# Patient Record
Sex: FEMALE | Race: OTHER | HISPANIC OR LATINO | URBAN - METROPOLITAN AREA
[De-identification: names, ages, dates, MRNs, and addresses within clinical notes are randomized per-mention and may not be internally consistent; named-entity substitution may affect disease eponyms.]

---

## 2018-10-04 ENCOUNTER — EMERGENCY (EMERGENCY)
Facility: HOSPITAL | Age: 73
LOS: 1 days | Discharge: HOME | End: 2018-10-04
Attending: EMERGENCY MEDICINE
Payer: COMMERCIAL

## 2018-10-04 VITALS
SYSTOLIC BLOOD PRESSURE: 124 MMHG | TEMPERATURE: 98 F | HEART RATE: 63 BPM | DIASTOLIC BLOOD PRESSURE: 76 MMHG | RESPIRATION RATE: 18 BRPM | OXYGEN SATURATION: 99 %

## 2018-10-04 VITALS
TEMPERATURE: 98 F | WEIGHT: 130.07 LBS | RESPIRATION RATE: 18 BRPM | HEIGHT: 64 IN | DIASTOLIC BLOOD PRESSURE: 100 MMHG | HEART RATE: 80 BPM | SYSTOLIC BLOOD PRESSURE: 190 MMHG | OXYGEN SATURATION: 98 %

## 2018-10-04 PROCEDURE — 70450 CT HEAD/BRAIN W/O DYE: CPT

## 2018-10-04 PROCEDURE — 72125 CT NECK SPINE W/O DYE: CPT

## 2018-10-04 PROCEDURE — 99284 EMERGENCY DEPT VISIT MOD MDM: CPT | Mod: 25

## 2018-10-04 PROCEDURE — 99284 EMERGENCY DEPT VISIT MOD MDM: CPT

## 2018-10-04 PROCEDURE — 72125 CT NECK SPINE W/O DYE: CPT | Mod: 26

## 2018-10-04 PROCEDURE — 70450 CT HEAD/BRAIN W/O DYE: CPT | Mod: 26

## 2018-10-04 NOTE — ED PROVIDER NOTE - PHYSICAL EXAMINATION
SKIN: hematoma on R forehead. otherwise within normal limits.   HENMT: oral pharynx normal, no masses seen, symmetric. Otherwise within normal limits.

## 2018-10-04 NOTE — ED ADULT TRIAGE NOTE - CHIEF COMPLAINT QUOTE
Swelling to r.s mary of forehead, reports hitting her face against the chir head rest while travelling by uber ,denied loc

## 2018-10-04 NOTE — ED ADULT NURSE NOTE - NSIMPLEMENTINTERV_GEN_ALL_ED
Implemented All Fall with Harm Risk Interventions:  [place] to call system. Call bell, personal items and telephone within reach. Instruct patient to call for assistance. Room bathroom lighting operational. Non-slip footwear when patient is off stretcher. Physically safe environment: no spills, clutter or unnecessary equipment. Stretcher in lowest position, wheels locked, appropriate side rails in place. Provide visual cue, wrist band, yellow gown, etc. Monitor gait and stability. Monitor for mental status changes and reorient to person, place, and time. Review medications for side effects contributing to fall risk. Reinforce activity limits and safety measures with patient and family. Provide visual clues: red socks.

## 2018-10-04 NOTE — ED PROVIDER NOTE - OBJECTIVE STATEMENT
73 y/o F w/ hx of Parkinson's c/o bruise to R forehead x today. pt was in an uber when lurched forward and back and hit her head on seat. Pt unsure if she is on any other anticoags but is on aspirin. Denies LOC and any other complaints. NKDA.

## 2018-10-04 NOTE — ED ADULT NURSE NOTE - OBJECTIVE STATEMENT
Pt received aaox3, noted with swelling to right forehead s/p injury. hit head against a chair. No loc.

## 2019-08-02 NOTE — ED ADULT TRIAGE NOTE - NS ED TRIAGE HISTORIAN
Informed pharmacy that we are waiting to hear back from patient regarding this.   [facility] and we are under the assumption it is covered there.  See other phone message.   Patient/EMS

## 2021-04-02 PROBLEM — Z00.00 ENCOUNTER FOR PREVENTIVE HEALTH EXAMINATION: Status: ACTIVE | Noted: 2021-04-02

## 2021-04-03 PROBLEM — G20 PARKINSON'S DISEASE: Chronic | Status: ACTIVE | Noted: 2018-10-04

## 2021-04-14 ENCOUNTER — APPOINTMENT (OUTPATIENT)
Dept: UROLOGY | Facility: CLINIC | Age: 76
End: 2021-04-14

## 2021-05-18 ENCOUNTER — EMERGENCY (EMERGENCY)
Facility: HOSPITAL | Age: 76
LOS: 1 days | Discharge: HOME | End: 2021-05-18
Attending: EMERGENCY MEDICINE
Payer: COMMERCIAL

## 2021-05-18 VITALS
SYSTOLIC BLOOD PRESSURE: 124 MMHG | OXYGEN SATURATION: 96 % | DIASTOLIC BLOOD PRESSURE: 79 MMHG | TEMPERATURE: 99 F | RESPIRATION RATE: 18 BRPM | HEART RATE: 89 BPM

## 2021-05-18 VITALS — WEIGHT: 120.81 LBS | HEIGHT: 64 IN

## 2021-05-18 LAB
ALBUMIN SERPL ELPH-MCNC: 3.1 G/DL — LOW (ref 3.5–5)
ALP SERPL-CCNC: 141 U/L — HIGH (ref 40–120)
ALT FLD-CCNC: 10 U/L DA — SIGNIFICANT CHANGE UP (ref 10–60)
ANION GAP SERPL CALC-SCNC: 11 MMOL/L — SIGNIFICANT CHANGE UP (ref 5–17)
APTT BLD: 31.9 SEC — SIGNIFICANT CHANGE UP (ref 27.5–35.5)
AST SERPL-CCNC: 20 U/L — SIGNIFICANT CHANGE UP (ref 10–40)
BASOPHILS # BLD AUTO: 0.04 K/UL — SIGNIFICANT CHANGE UP (ref 0–0.2)
BASOPHILS NFR BLD AUTO: 0.5 % — SIGNIFICANT CHANGE UP (ref 0–2)
BILIRUB SERPL-MCNC: 0.7 MG/DL — SIGNIFICANT CHANGE UP (ref 0.2–1.2)
BUN SERPL-MCNC: 30 MG/DL — HIGH (ref 7–18)
CALCIUM SERPL-MCNC: 10.4 MG/DL — SIGNIFICANT CHANGE UP (ref 8.4–10.5)
CHLORIDE SERPL-SCNC: 108 MMOL/L — SIGNIFICANT CHANGE UP (ref 96–108)
CK MB BLD-MCNC: 2.7 % — SIGNIFICANT CHANGE UP (ref 0–3.5)
CK MB CFR SERPL CALC: 1 NG/ML — SIGNIFICANT CHANGE UP (ref 0–3.6)
CK SERPL-CCNC: 35 U/L — SIGNIFICANT CHANGE UP (ref 21–215)
CK SERPL-CCNC: 37 U/L — SIGNIFICANT CHANGE UP (ref 21–215)
CO2 SERPL-SCNC: 26 MMOL/L — SIGNIFICANT CHANGE UP (ref 22–31)
CREAT SERPL-MCNC: 0.87 MG/DL — SIGNIFICANT CHANGE UP (ref 0.5–1.3)
EOSINOPHIL # BLD AUTO: 0.1 K/UL — SIGNIFICANT CHANGE UP (ref 0–0.5)
EOSINOPHIL NFR BLD AUTO: 1.3 % — SIGNIFICANT CHANGE UP (ref 0–6)
GLUCOSE SERPL-MCNC: 58 MG/DL — LOW (ref 70–99)
HCT VFR BLD CALC: 30.7 % — LOW (ref 34.5–45)
HGB BLD-MCNC: 9.8 G/DL — LOW (ref 11.5–15.5)
IMM GRANULOCYTES NFR BLD AUTO: 0.4 % — SIGNIFICANT CHANGE UP (ref 0–1.5)
INR BLD: 1.04 RATIO — SIGNIFICANT CHANGE UP (ref 0.88–1.16)
LYMPHOCYTES # BLD AUTO: 0.95 K/UL — LOW (ref 1–3.3)
LYMPHOCYTES # BLD AUTO: 12.1 % — LOW (ref 13–44)
MCHC RBC-ENTMCNC: 28.3 PG — SIGNIFICANT CHANGE UP (ref 27–34)
MCHC RBC-ENTMCNC: 31.9 GM/DL — LOW (ref 32–36)
MCV RBC AUTO: 88.7 FL — SIGNIFICANT CHANGE UP (ref 80–100)
MONOCYTES # BLD AUTO: 0.56 K/UL — SIGNIFICANT CHANGE UP (ref 0–0.9)
MONOCYTES NFR BLD AUTO: 7.2 % — SIGNIFICANT CHANGE UP (ref 2–14)
NEUTROPHILS # BLD AUTO: 6.15 K/UL — SIGNIFICANT CHANGE UP (ref 1.8–7.4)
NEUTROPHILS NFR BLD AUTO: 78.5 % — HIGH (ref 43–77)
NRBC # BLD: 0 /100 WBCS — SIGNIFICANT CHANGE UP (ref 0–0)
PLATELET # BLD AUTO: 230 K/UL — SIGNIFICANT CHANGE UP (ref 150–400)
POTASSIUM SERPL-MCNC: 3.5 MMOL/L — SIGNIFICANT CHANGE UP (ref 3.5–5.3)
POTASSIUM SERPL-SCNC: 3.5 MMOL/L — SIGNIFICANT CHANGE UP (ref 3.5–5.3)
PROT SERPL-MCNC: 6.8 G/DL — SIGNIFICANT CHANGE UP (ref 6–8.3)
PROTHROM AB SERPL-ACNC: 12.4 SEC — SIGNIFICANT CHANGE UP (ref 10.6–13.6)
RBC # BLD: 3.46 M/UL — LOW (ref 3.8–5.2)
RBC # FLD: 14.4 % — SIGNIFICANT CHANGE UP (ref 10.3–14.5)
SODIUM SERPL-SCNC: 145 MMOL/L — SIGNIFICANT CHANGE UP (ref 135–145)
TROPONIN I SERPL-MCNC: 0.04 NG/ML — SIGNIFICANT CHANGE UP (ref 0–0.04)
TROPONIN I SERPL-MCNC: 0.04 NG/ML — SIGNIFICANT CHANGE UP (ref 0–0.04)
WBC # BLD: 7.83 K/UL — SIGNIFICANT CHANGE UP (ref 3.8–10.5)
WBC # FLD AUTO: 7.83 K/UL — SIGNIFICANT CHANGE UP (ref 3.8–10.5)

## 2021-05-18 PROCEDURE — 85730 THROMBOPLASTIN TIME PARTIAL: CPT

## 2021-05-18 PROCEDURE — 82553 CREATINE MB FRACTION: CPT

## 2021-05-18 PROCEDURE — 80053 COMPREHEN METABOLIC PANEL: CPT

## 2021-05-18 PROCEDURE — 82550 ASSAY OF CK (CPK): CPT

## 2021-05-18 PROCEDURE — 85025 COMPLETE CBC W/AUTO DIFF WBC: CPT

## 2021-05-18 PROCEDURE — 93005 ELECTROCARDIOGRAM TRACING: CPT

## 2021-05-18 PROCEDURE — 71045 X-RAY EXAM CHEST 1 VIEW: CPT

## 2021-05-18 PROCEDURE — 70450 CT HEAD/BRAIN W/O DYE: CPT

## 2021-05-18 PROCEDURE — 96374 THER/PROPH/DIAG INJ IV PUSH: CPT

## 2021-05-18 PROCEDURE — 99284 EMERGENCY DEPT VISIT MOD MDM: CPT | Mod: 25

## 2021-05-18 PROCEDURE — 85610 PROTHROMBIN TIME: CPT

## 2021-05-18 PROCEDURE — 99285 EMERGENCY DEPT VISIT HI MDM: CPT

## 2021-05-18 PROCEDURE — 70450 CT HEAD/BRAIN W/O DYE: CPT | Mod: 26,MA

## 2021-05-18 PROCEDURE — 36415 COLL VENOUS BLD VENIPUNCTURE: CPT

## 2021-05-18 PROCEDURE — 71045 X-RAY EXAM CHEST 1 VIEW: CPT | Mod: 26

## 2021-05-18 PROCEDURE — 84484 ASSAY OF TROPONIN QUANT: CPT

## 2021-05-18 PROCEDURE — 82962 GLUCOSE BLOOD TEST: CPT

## 2021-05-18 RX ORDER — DEXTROSE 50 % IN WATER 50 %
50 SYRINGE (ML) INTRAVENOUS ONCE
Refills: 0 | Status: COMPLETED | OUTPATIENT
Start: 2021-05-18 | End: 2021-05-18

## 2021-05-18 RX ADMIN — Medication 50 MILLILITER(S): at 12:32

## 2021-05-18 NOTE — ED ADULT NURSE NOTE - OBJECTIVE STATEMENT
From [facility] home as a Stroke notification. Stroke protocol followed. Pt states she had palpiations

## 2021-05-18 NOTE — ED PROVIDER NOTE - CLINICAL SUMMARY MEDICAL DECISION MAKING FREE TEXT BOX
patient with report of right sided weakness and confusion, glucose noted to be low at the time. now fs is normal, no focal neuro deficits noted on reevaluation. CT and labs unremarkable. will monitor patient with report of right sided weakness and confusion, glucose noted to be low at the time. now fs is normal, no focal neuro deficits noted on reevaluation. CT and labs unremarkable. will monitor. AMS likely due to hypoglycemia

## 2021-05-18 NOTE — ED PROVIDER NOTE - OBJECTIVE STATEMENT
Patient sent from nursing home for right sided weakness and confusion noted 1 hour prior to Emergency Department arrival. fs was noted to be 49 at the time. Glucagon given by nursing home staff. Patient on glipizide. Patient denies complaints

## 2021-05-18 NOTE — ED ADULT NURSE NOTE - CHIEF COMPLAINT QUOTE
[facility]  Progress Note     Jonna Rolling Patient Status:  Inpatient    1947 MRN [id number]   [facility] 2NE-A Attending Karolina Lerner MD   Hosp Day # 2 PCP Maria Eugenia Byrd MD     Chief Complaint:  SOB  S:   Walked on exacerbation, left lower lobe pneumonia with hypotension and hypoxia  1. IV antibiotics  Zithro/ rocephin   2. Nebulizer treatments   3. Oxygen as needed   Has home o2   4. Pulmonary following  5. US legs neg   2.  Chest pain with presyncopal episode, CAD w bib/ems notification for R/O Stroke . from NH noted  by staff w/ AMS , Rt sided weakness 30 minutes pta . FS=49  mg/dl . Given Glucagon 10mg IM at the NH   repeat FS= 119 .

## 2021-05-18 NOTE — ED PROVIDER NOTE - NSFOLLOWUPINSTRUCTIONS_ED_ALL_ED_FT
Hypoglycemia in a Person with Diabetes    WHAT YOU NEED TO KNOW:    What is hypoglycemia? Hypoglycemia is a serious condition that happens when your blood glucose (sugar) level drops too low. The blood sugar level is usually too high in a person with diabetes, but the level can also drop too low. It is important to follow your diabetes management plan to keep your blood sugar level steady.    What increases my risk for hypoglycemia?   •Binge eating, eating large amounts of carbohydrates in processed foods such as potato chips      •A missed meal, or a meal eaten later than usual       •Vomiting      •Certain medicines, including insulin or other diabetes medicine       •More exercise than usual, without extra food       •Alcohol use      •Pregnancy, older age      •Decreased liver or kidney function      •Not knowing your symptoms are symptoms of hypoglycemia      What are the signs and symptoms of hypoglycemia?   •Headache, hunger, or shakiness       •Trouble thinking or moodiness       •Sweating, or a pounding heartbeat       •Forgetfulness, confusion, or double vision       •Weakness or trouble walking       •Numbness and tingling around your mouth       •Seizures, coma, or loss of consciousness      How do I manage hypoglycemia?   •Check your blood sugar level right away if you have symptoms of hypoglycemia. Hypoglycemia usually happens when your blood sugar level is 70 mg/dL or below. Ask your diabetes care team what blood sugar level is too low for you.      •If your blood sugar level is too low, eat or drink 15 grams of fast-acting carbohydrate. Examples of this amount of fast-acting carbohydrate are 4 ounces (½ cup) of fruit juice or 4 ounces of regular soda. Other examples are 2 tablespoons of raisins or 1 tube of glucose gel.  Ways to Raise Your Blood Sugar     Check your blood sugar level 15 minutes later. Sit still as you wait. If the level is still low (less than 100 mg/dL), have another 15 grams of carbohydrate. When the level returns to 100 mg/dL, eat a meal if it is time. If your meal time is more than 1 hour away, eat a snack. The snack should contain carbohydrates, such as the following: ?3/4 cup of cereal      ?1 cup of skim or low fat milk      ?6 soda crackers      ?1/2 of a turkey sandwich      ?15 fat-free chips  This will help prevent another drop in blood sugar. Always carefully follow your diabetes care team's instructions on how to treat low blood sugar levels.     •Always carry a source of fast-acting carbohydrate. If you have symptoms of hypoglycemia and you do not have a blood glucose meter, have a source of fast-acting carbohydrate anyway. Avoid carbohydrate foods that are high in fat. The fat content may make the carbohydrate take longer to increase your blood sugar level. Ask your diabetes care team if you should carry a glucagon kit. Glucagon is a medicine that is injected when you develop severe hypoglycemia and become unconscious. Check the expiration date every month and replace it before it expires.      •Teach others how to help you if you have symptoms of hypoglycemia. Tell them about the symptoms of hypoglycemia. Ask them to give you a source of fast-acting carbohydrate if you cannot get it yourself. Ask them to give you a glucagon injection if you have signs of hypoglycemia and you become unconscious or have a seizure. Ask them to call the local emergency number (911 in the ). This is an emergency. Tell them never to try to make you swallow anything if you faint or have a seizure.      •Wear medical alert jewelry or carry a card that says you have diabetes. Ask where to get these items.   Medical Alert Jewelry           How do I prevent hypoglycemia?   •Take diabetes medicine as directed. Take your medicine at the right time and in the right amount. Do not double the amount of medicine you take unless instructed by your diabetes care team.       •Eat regular meals and snacks. Talk to your dietitian or diabetes care team about a meal plan that is right for you. Do not skip meals.      •Check your blood sugar level as directed. Ask your diabetes care team what your blood sugar levels should be before and after you eat. Ask when and how often to check your blood sugar level. You may need to check at least 3 times each day. Record your blood sugar level results and take the record with you when you see your care team. Changes may need to be made to your medicine, food, or exercise schedules using the record.   How to check your blood sugar           •Check your blood sugar level before you exercise. Physical activity, such as exercise, can decrease your blood sugar level. If your blood sugar level is less than 100 mg/dL, have a carbohydrate snack. Examples are 4 to 6 crackers, ½ banana, 8 ounces (1 cup) of nonfat or 1% milk, or 4 ounces (½ cup) of juice. If you will be active for more than 1 hour, you may need to check your blood sugar level every 30 minutes. Your diabetes care team may also recommend that you check your blood sugar level after your activity.      •Know the risks if you choose to drink alcohol. Alcohol can cause your blood sugar levels to be low if you use insulin. Alcohol can cause high blood sugar levels and weight gain if you drink too much. Women 21 years or older and men 65 years or older should limit alcohol to 1 drink a day. Men aged 21 to 64 years should limit alcohol to 2 drinks a day. A drink of alcohol is 12 ounces of beer, 5 ounces of wine, or 1½ ounces of liquor.       When should I or someone else call the local emergency number (911 in the )?   •You have a seizure or pass out.       •Your blood sugar is less than 50 mg/dL and does not respond to treatment.      •You feel you are going to pass out.       •You have trouble thinking clearly.       When should I call my diabetes care team?   •You have had symptoms of low blood sugar several times.       •You have questions about the amount of insulin or diabetes medicine you are taking.       •You have questions or concerns about your condition or care.       CARE AGREEMENT:    You have the right to help plan your care. Learn about your health condition and how it may be treated. Discuss treatment options with your healthcare providers to decide what care you want to receive. You always have the right to refuse treatment.

## 2021-05-18 NOTE — ED PROVIDER NOTE - PROGRESS NOTE DETAILS
Patient ate food in the Emergency Department. glucose remained stable. patient remains awake alert. return to nursing home.

## 2021-05-18 NOTE — ED PROVIDER NOTE - PATIENT PORTAL LINK FT
You can access the FollowMyHealth Patient Portal offered by [facility] by registering at the following website: [email or web address]. By joining [employer]’s FollowMyHealth portal, you will also be able to view your health information using other applications (apps) compatible with our system.

## 2021-05-18 NOTE — ED ADULT TRIAGE NOTE - CHIEF COMPLAINT QUOTE
bib/ems notification for R/O Stroke . from NH noted  by staff w/ AMS , Rt sided weakness 30 minutes pta . FS=49  mg/dl . Given Glucagon 10mg IM at the NH   repeat FS= 119 .

## 2021-05-18 NOTE — ED PROVIDER NOTE - PHYSICAL EXAMINATION
Patient awake NAD, strength and sensation intact in both arms and legs, speech clear. face symmetric. NIHSS 0, passed dysphagia eval

## 2021-06-01 ENCOUNTER — TRANSCRIPTION ENCOUNTER (OUTPATIENT)
Age: 76
End: 2021-06-01

## 2021-06-01 ENCOUNTER — INPATIENT (INPATIENT)
Facility: HOSPITAL | Age: 76
LOS: 19 days | Discharge: NURSING, REHAB OR LONG-TERM CARE | DRG: 394 | End: 2021-06-21
Attending: INTERNAL MEDICINE | Admitting: INTERNAL MEDICINE
Payer: COMMERCIAL

## 2021-06-01 VITALS
OXYGEN SATURATION: 98 % | SYSTOLIC BLOOD PRESSURE: 125 MMHG | DIASTOLIC BLOOD PRESSURE: 75 MMHG | HEIGHT: 64 IN | TEMPERATURE: 98 F | HEART RATE: 74 BPM | RESPIRATION RATE: 18 BRPM | WEIGHT: 110.01 LBS

## 2021-06-01 DIAGNOSIS — Z29.9 ENCOUNTER FOR PROPHYLACTIC MEASURES, UNSPECIFIED: ICD-10-CM

## 2021-06-01 DIAGNOSIS — I82.409 ACUTE EMBOLISM AND THROMBOSIS OF UNSPECIFIED DEEP VEINS OF UNSPECIFIED LOWER EXTREMITY: ICD-10-CM

## 2021-06-01 DIAGNOSIS — I10 ESSENTIAL (PRIMARY) HYPERTENSION: ICD-10-CM

## 2021-06-01 DIAGNOSIS — Z71.89 OTHER SPECIFIED COUNSELING: ICD-10-CM

## 2021-06-01 DIAGNOSIS — Z87.09 PERSONAL HISTORY OF OTHER DISEASES OF THE RESPIRATORY SYSTEM: ICD-10-CM

## 2021-06-01 DIAGNOSIS — G20 PARKINSON'S DISEASE: ICD-10-CM

## 2021-06-01 DIAGNOSIS — K62.5 HEMORRHAGE OF ANUS AND RECTUM: ICD-10-CM

## 2021-06-01 DIAGNOSIS — D64.9 ANEMIA, UNSPECIFIED: ICD-10-CM

## 2021-06-01 DIAGNOSIS — N94.89 OTHER SPECIFIED CONDITIONS ASSOCIATED WITH FEMALE GENITAL ORGANS AND MENSTRUAL CYCLE: ICD-10-CM

## 2021-06-01 DIAGNOSIS — E11.9 TYPE 2 DIABETES MELLITUS WITHOUT COMPLICATIONS: ICD-10-CM

## 2021-06-01 LAB
ALBUMIN SERPL ELPH-MCNC: 2.6 G/DL — LOW (ref 3.5–5)
ALP SERPL-CCNC: 109 U/L — SIGNIFICANT CHANGE UP (ref 40–120)
ALT FLD-CCNC: 14 U/L DA — SIGNIFICANT CHANGE UP (ref 10–60)
ANION GAP SERPL CALC-SCNC: 8 MMOL/L — SIGNIFICANT CHANGE UP (ref 5–17)
APTT BLD: 31.6 SEC — SIGNIFICANT CHANGE UP (ref 27.5–35.5)
AST SERPL-CCNC: 24 U/L — SIGNIFICANT CHANGE UP (ref 10–40)
BASOPHILS # BLD AUTO: 0.04 K/UL — SIGNIFICANT CHANGE UP (ref 0–0.2)
BASOPHILS NFR BLD AUTO: 0.4 % — SIGNIFICANT CHANGE UP (ref 0–2)
BILIRUB SERPL-MCNC: 0.4 MG/DL — SIGNIFICANT CHANGE UP (ref 0.2–1.2)
BLD GP AB SCN SERPL QL: SIGNIFICANT CHANGE UP
BUN SERPL-MCNC: 16 MG/DL — SIGNIFICANT CHANGE UP (ref 7–18)
CALCIUM SERPL-MCNC: 9.8 MG/DL — SIGNIFICANT CHANGE UP (ref 8.4–10.5)
CHLORIDE SERPL-SCNC: 109 MMOL/L — HIGH (ref 96–108)
CO2 SERPL-SCNC: 23 MMOL/L — SIGNIFICANT CHANGE UP (ref 22–31)
CREAT SERPL-MCNC: 0.75 MG/DL — SIGNIFICANT CHANGE UP (ref 0.5–1.3)
EOSINOPHIL # BLD AUTO: 0.11 K/UL — SIGNIFICANT CHANGE UP (ref 0–0.5)
EOSINOPHIL NFR BLD AUTO: 1.2 % — SIGNIFICANT CHANGE UP (ref 0–6)
GLUCOSE BLDC GLUCOMTR-MCNC: 39 MG/DL — CRITICAL LOW (ref 70–99)
GLUCOSE BLDC GLUCOMTR-MCNC: 57 MG/DL — LOW (ref 70–99)
GLUCOSE BLDC GLUCOMTR-MCNC: 71 MG/DL — SIGNIFICANT CHANGE UP (ref 70–99)
GLUCOSE BLDC GLUCOMTR-MCNC: 91 MG/DL — SIGNIFICANT CHANGE UP (ref 70–99)
GLUCOSE BLDC GLUCOMTR-MCNC: 96 MG/DL — SIGNIFICANT CHANGE UP (ref 70–99)
GLUCOSE SERPL-MCNC: 87 MG/DL — SIGNIFICANT CHANGE UP (ref 70–99)
HCT VFR BLD CALC: 27.3 % — LOW (ref 34.5–45)
HCT VFR BLD CALC: 28.3 % — LOW (ref 34.5–45)
HCT VFR BLD CALC: 30.4 % — LOW (ref 34.5–45)
HGB BLD-MCNC: 8.7 G/DL — LOW (ref 11.5–15.5)
HGB BLD-MCNC: 8.9 G/DL — LOW (ref 11.5–15.5)
HGB BLD-MCNC: 9.8 G/DL — LOW (ref 11.5–15.5)
IMM GRANULOCYTES NFR BLD AUTO: 1 % — SIGNIFICANT CHANGE UP (ref 0–1.5)
INR BLD: 1.04 RATIO — SIGNIFICANT CHANGE UP (ref 0.88–1.16)
LYMPHOCYTES # BLD AUTO: 1.07 K/UL — SIGNIFICANT CHANGE UP (ref 1–3.3)
LYMPHOCYTES # BLD AUTO: 11.6 % — LOW (ref 13–44)
MCHC RBC-ENTMCNC: 28 PG — SIGNIFICANT CHANGE UP (ref 27–34)
MCHC RBC-ENTMCNC: 28.2 PG — SIGNIFICANT CHANGE UP (ref 27–34)
MCHC RBC-ENTMCNC: 28.5 PG — SIGNIFICANT CHANGE UP (ref 27–34)
MCHC RBC-ENTMCNC: 31.4 GM/DL — LOW (ref 32–36)
MCHC RBC-ENTMCNC: 31.9 GM/DL — LOW (ref 32–36)
MCHC RBC-ENTMCNC: 32.2 GM/DL — SIGNIFICANT CHANGE UP (ref 32–36)
MCV RBC AUTO: 87.8 FL — SIGNIFICANT CHANGE UP (ref 80–100)
MCV RBC AUTO: 88.4 FL — SIGNIFICANT CHANGE UP (ref 80–100)
MCV RBC AUTO: 89.6 FL — SIGNIFICANT CHANGE UP (ref 80–100)
MONOCYTES # BLD AUTO: 0.57 K/UL — SIGNIFICANT CHANGE UP (ref 0–0.9)
MONOCYTES NFR BLD AUTO: 6.2 % — SIGNIFICANT CHANGE UP (ref 2–14)
NEUTROPHILS # BLD AUTO: 7.33 K/UL — SIGNIFICANT CHANGE UP (ref 1.8–7.4)
NEUTROPHILS NFR BLD AUTO: 79.6 % — HIGH (ref 43–77)
NRBC # BLD: 0 /100 WBCS — SIGNIFICANT CHANGE UP (ref 0–0)
PLATELET # BLD AUTO: 269 K/UL — SIGNIFICANT CHANGE UP (ref 150–400)
PLATELET # BLD AUTO: 280 K/UL — SIGNIFICANT CHANGE UP (ref 150–400)
PLATELET # BLD AUTO: 304 K/UL — SIGNIFICANT CHANGE UP (ref 150–400)
POTASSIUM SERPL-MCNC: 4.5 MMOL/L — SIGNIFICANT CHANGE UP (ref 3.5–5.3)
POTASSIUM SERPL-SCNC: 4.5 MMOL/L — SIGNIFICANT CHANGE UP (ref 3.5–5.3)
PROT SERPL-MCNC: 5.8 G/DL — LOW (ref 6–8.3)
PROTHROM AB SERPL-ACNC: 12.3 SEC — SIGNIFICANT CHANGE UP (ref 10.6–13.6)
RBC # BLD: 3.11 M/UL — LOW (ref 3.8–5.2)
RBC # BLD: 3.16 M/UL — LOW (ref 3.8–5.2)
RBC # BLD: 3.44 M/UL — LOW (ref 3.8–5.2)
RBC # FLD: 15.1 % — HIGH (ref 10.3–14.5)
RBC # FLD: 15.2 % — HIGH (ref 10.3–14.5)
RBC # FLD: 15.4 % — HIGH (ref 10.3–14.5)
SARS-COV-2 RNA SPEC QL NAA+PROBE: SIGNIFICANT CHANGE UP
SODIUM SERPL-SCNC: 140 MMOL/L — SIGNIFICANT CHANGE UP (ref 135–145)
WBC # BLD: 7.91 K/UL — SIGNIFICANT CHANGE UP (ref 3.8–10.5)
WBC # BLD: 9.15 K/UL — SIGNIFICANT CHANGE UP (ref 3.8–10.5)
WBC # BLD: 9.21 K/UL — SIGNIFICANT CHANGE UP (ref 3.8–10.5)
WBC # FLD AUTO: 7.91 K/UL — SIGNIFICANT CHANGE UP (ref 3.8–10.5)
WBC # FLD AUTO: 9.15 K/UL — SIGNIFICANT CHANGE UP (ref 3.8–10.5)
WBC # FLD AUTO: 9.21 K/UL — SIGNIFICANT CHANGE UP (ref 3.8–10.5)

## 2021-06-01 PROCEDURE — 93970 EXTREMITY STUDY: CPT | Mod: 26

## 2021-06-01 PROCEDURE — 99291 CRITICAL CARE FIRST HOUR: CPT

## 2021-06-01 PROCEDURE — 74177 CT ABD & PELVIS W/CONTRAST: CPT | Mod: 26,MA

## 2021-06-01 RX ORDER — POLYETHYLENE GLYCOL 3350 17 G/17G
17 POWDER, FOR SOLUTION ORAL DAILY
Refills: 0 | Status: DISCONTINUED | OUTPATIENT
Start: 2021-06-01 | End: 2021-06-21

## 2021-06-01 RX ORDER — SODIUM CHLORIDE 9 MG/ML
1000 INJECTION INTRAMUSCULAR; INTRAVENOUS; SUBCUTANEOUS
Refills: 0 | Status: DISCONTINUED | OUTPATIENT
Start: 2021-06-01 | End: 2021-06-01

## 2021-06-01 RX ORDER — ONDANSETRON 8 MG/1
4 TABLET, FILM COATED ORAL EVERY 6 HOURS
Refills: 0 | Status: DISCONTINUED | OUTPATIENT
Start: 2021-06-01 | End: 2021-06-21

## 2021-06-01 RX ORDER — ALBUTEROL 90 UG/1
2 AEROSOL, METERED ORAL EVERY 6 HOURS
Refills: 0 | Status: DISCONTINUED | OUTPATIENT
Start: 2021-06-01 | End: 2021-06-21

## 2021-06-01 RX ORDER — SODIUM CHLORIDE 9 MG/ML
1000 INJECTION, SOLUTION INTRAVENOUS
Refills: 0 | Status: DISCONTINUED | OUTPATIENT
Start: 2021-06-01 | End: 2021-06-02

## 2021-06-01 RX ORDER — RASAGILINE 0.5 MG/1
1 TABLET ORAL DAILY
Refills: 0 | Status: DISCONTINUED | OUTPATIENT
Start: 2021-06-01 | End: 2021-06-21

## 2021-06-01 RX ORDER — INSULIN LISPRO 100/ML
VIAL (ML) SUBCUTANEOUS
Refills: 0 | Status: DISCONTINUED | OUTPATIENT
Start: 2021-06-01 | End: 2021-06-21

## 2021-06-01 RX ORDER — OXYBUTYNIN CHLORIDE 5 MG
5 TABLET ORAL DAILY
Refills: 0 | Status: DISCONTINUED | OUTPATIENT
Start: 2021-06-01 | End: 2021-06-01

## 2021-06-01 RX ORDER — CARBIDOPA, LEVODOPA, AND ENTACAPONE 50; 200; 200 MG/1; MG/1; MG/1
1 TABLET, FILM COATED ORAL THREE TIMES A DAY
Refills: 0 | Status: DISCONTINUED | OUTPATIENT
Start: 2021-06-01 | End: 2021-06-21

## 2021-06-01 RX ORDER — ACETAMINOPHEN 500 MG
650 TABLET ORAL EVERY 6 HOURS
Refills: 0 | Status: DISCONTINUED | OUTPATIENT
Start: 2021-06-01 | End: 2021-06-21

## 2021-06-01 RX ORDER — SODIUM CHLORIDE 9 MG/ML
1000 INJECTION INTRAMUSCULAR; INTRAVENOUS; SUBCUTANEOUS ONCE
Refills: 0 | Status: COMPLETED | OUTPATIENT
Start: 2021-06-01 | End: 2021-06-01

## 2021-06-01 RX ORDER — SENNA PLUS 8.6 MG/1
2 TABLET ORAL AT BEDTIME
Refills: 0 | Status: DISCONTINUED | OUTPATIENT
Start: 2021-06-01 | End: 2021-06-21

## 2021-06-01 RX ORDER — SOD SULF/SODIUM/NAHCO3/KCL/PEG
4000 SOLUTION, RECONSTITUTED, ORAL ORAL ONCE
Refills: 0 | Status: COMPLETED | OUTPATIENT
Start: 2021-06-01 | End: 2021-06-01

## 2021-06-01 RX ORDER — OXYBUTYNIN CHLORIDE 5 MG
5 TABLET ORAL DAILY
Refills: 0 | Status: DISCONTINUED | OUTPATIENT
Start: 2021-06-01 | End: 2021-06-21

## 2021-06-01 RX ORDER — PANTOPRAZOLE SODIUM 20 MG/1
40 TABLET, DELAYED RELEASE ORAL
Refills: 0 | Status: DISCONTINUED | OUTPATIENT
Start: 2021-06-01 | End: 2021-06-08

## 2021-06-01 RX ADMIN — SENNA PLUS 2 TABLET(S): 8.6 TABLET ORAL at 23:14

## 2021-06-01 RX ADMIN — CARBIDOPA, LEVODOPA, AND ENTACAPONE 1 TABLET(S): 50; 200; 200 TABLET, FILM COATED ORAL at 14:02

## 2021-06-01 RX ADMIN — SODIUM CHLORIDE 75 MILLILITER(S): 9 INJECTION, SOLUTION INTRAVENOUS at 14:08

## 2021-06-01 RX ADMIN — SODIUM CHLORIDE 1000 MILLILITER(S): 9 INJECTION INTRAMUSCULAR; INTRAVENOUS; SUBCUTANEOUS at 08:20

## 2021-06-01 RX ADMIN — SODIUM CHLORIDE 75 MILLILITER(S): 9 INJECTION, SOLUTION INTRAVENOUS at 23:45

## 2021-06-01 RX ADMIN — PANTOPRAZOLE SODIUM 40 MILLIGRAM(S): 20 TABLET, DELAYED RELEASE ORAL at 18:29

## 2021-06-01 RX ADMIN — Medication 20 MILLIGRAM(S): at 18:24

## 2021-06-01 RX ADMIN — CARBIDOPA, LEVODOPA, AND ENTACAPONE 1 TABLET(S): 50; 200; 200 TABLET, FILM COATED ORAL at 23:14

## 2021-06-01 NOTE — CONSULT NOTE ADULT - ASSESSMENT
The etiology for rectal bleeding in this patient is most likely due to:  1. Rectal ulcer  2. Proctitis  3. Colorectal neoplasm  4. Hemorrhoids    Suggestions:    1. Monitor H/H  2. Transfuse PRBC as needed  3. Avoid NSAID  4. Protonix 40mg daily  5. Colonoscopy  6. Check CEA  7. DVT prophylaxis

## 2021-06-01 NOTE — H&P ADULT - PROBLEM SELECTOR PLAN 10
discussed GOC with daughter, Batool Johnson ([phone])  patient is DNR/DNI  MOLST form drafted and placed in chart

## 2021-06-01 NOTE — CONSULT NOTE ADULT - NECK DETAILS
When letter from Dr. Brewer office is received. MA will fax over all information needed to schedule appt with rheumatology.    supple/no JVD

## 2021-06-01 NOTE — H&P ADULT - NSHPSOCIALHISTORY_GEN_ALL_CORE
Patient is from [facility]. Daughter denies any smoking, alcohol use or use of illicit drugs.

## 2021-06-01 NOTE — H&P ADULT - PROBLEM SELECTOR PLAN 9
hold AC in setting of GI bleed  SCD on right leg only due to noted DVT on LLE hold AC in setting of GI bleed  cannot use SCD due to DVT in lower extremities

## 2021-06-01 NOTE — ED PROVIDER NOTE - CLINICAL SUMMARY MEDICAL DECISION MAKING FREE TEXT BOX
75 yr old female with hx of parkinsons lumbar fx, copd, diverticulitis, HTN, DM, HLD presents to ed for vaginal bleeding per nsg home. pt c/o left leg pain but otherwise no sx. limited ros due to poor hx    rectal bleeding- possibly diverticulitis vs gib- labs, ct, admit

## 2021-06-01 NOTE — H&P ADULT - PROBLEM SELECTOR PLAN 2
noted to have DVT in LLE on CT abdomen   f/u US duplex of bilateral lower extremities   patient not given anticoagulation due to GI bleed  heme/onc, Dr. Yan consulted   patient may need IVC filter

## 2021-06-01 NOTE — H&P ADULT - ASSESSMENT
Patient is a 75 year old female from [place], with PMH of Parkinson's, COPD, diverticulosis, HTN, DM and HLD, who presented to the ED due to rectal bleed.     Hgb of 9.8. COVID negative. CT abdomen showing rectal wall thickening, colonic diverticulosis without diverticulitis, right adnexal mass and DVT noted in LLE (left common femoral, femoral and deep femoral veins).

## 2021-06-01 NOTE — ED PROVIDER NOTE - PROGRESS NOTE DETAILS
Arellano: h/h stable normal BUN. hemodynamically stable but ct shows left dvt, diverticulosis, proctitis and adnexal mass. will not anticoagulate due to rectal bleeding.  admit to med for rectal bleed along with the other findings

## 2021-06-01 NOTE — H&P ADULT - ATTENDING COMMENTS
patient with rectal bleed , dvt , adnexal mass for acute hospitalization   care plan as above   monitor cbc   gi , oncology eval   prognosis poor   care plan d/w NH RN ,ER ATTENDING , RESIDENT AND PATIENT'S FAMILY.

## 2021-06-01 NOTE — H&P ADULT - NSICDXPASTMEDICALHX_GEN_ALL_CORE_FT
PAST MEDICAL HISTORY:  Diabetes mellitus     Diverticulosis     History of COPD     HLD (hyperlipidemia)     HTN (hypertension)     Parkinson disease

## 2021-06-01 NOTE — ED PROVIDER NOTE - OBJECTIVE STATEMENT
75 yr old female with hx of parkinsons lumbar fx, copd, diverticulitis, HTN, DM, HLD presents to ed for vaginal bleeding per nsg home. pt c/o left leg pain but otherwise no sx. limited ros due to poor hx

## 2021-06-01 NOTE — H&P ADULT - PROBLEM SELECTOR PLAN 1
patient noted to have rectal bleeding   Hgb stable from prior labs at 9.8   will monitor CBC q6 for now  blood transfusion consent in chart if needed; transfuse if <7   GI, Dr. Galvez consulted   will start on clear liquid diet for now  PPI IV BID

## 2021-06-01 NOTE — H&P ADULT - HISTORY OF PRESENT ILLNESS
Patient is a 75 year old female from [place], with PMH of Parkinson's, COPD, diverticulosis, HTN, DM and HLD, who presented to the ED due to rectal bleed. History is limited from patient due to her being a poor historian. As per ED provider note, patient was sent to the ED for concern for vaginal bleed. In ED, patient is noted to have rectal bleed instead of vaginal bleed. Spoke to daughter, Batool Johnson ([phone]) for further history. Daughter states she was told that patient was sent to ED for concern for vaginal bleed. Does not have any other concerns or complaints at this time.

## 2021-06-01 NOTE — CONSULT NOTE ADULT - SUBJECTIVE AND OBJECTIVE BOX
[  ] STAT REQUEST              [ X ] ROUTINE REQUEST    Patient is a 75 year old with rectal bleeding. GI consulted to evaluate.        HPI:  Patient is a 75 year old female from [place], with past medical history significant for Parkinson's, COPD, diverticulosis, HTN, DM and HLD, who presented to the ED due to rectal bleed. History is limited from patient due to her being a poor historian. As per ED provider note, patient was sent to the ED for concern for vaginal bleed. In ED, patient is noted to have rectal bleed instead of vaginal bleed. Patient is confused unable to provide any history. No abdominal pain, nausea, vomiting, hematemesis, melena, fever, chills, chest pain, SOB, cough, hematuria, dysuria or diarrhea reported.       PAIN MANAGEMENT:  Pain Scale:                0/10  Pain Location:      Prior Colonoscopy: Unknown    PAST MEDICAL HISTORY  Parkinson disease  COPD  Diverticulosis  HTN (hypertension)  Diabetes mellitus  HLD (hyperlipidemia)        PAST SURGICAL HISTORY  No significant past surgical history        Allergies    No Known Allergies    Intolerances  None       MEDICATIONS  (STANDING):  carbidopa/levodopa/entacapone 12.5/50/200 1 Tablet(s) Oral three times a day  dextrose 5% + sodium chloride 0.9%. 1000 milliLiter(s) (75 mL/Hr) IV Continuous <Continuous>  enalapril 20 milliGRAM(s) Oral two times a day  insulin lispro (ADMELOG) corrective regimen sliding scale   SubCutaneous Before meals and at bedtime  oxybutynin XL 5 milliGRAM(s) Oral daily  pantoprazole  Injectable 40 milliGRAM(s) IV Push two times a day  rasagiline Tablet 1 milliGRAM(s) Oral daily  senna 2 Tablet(s) Oral at bedtime    MEDICATIONS  (PRN):  acetaminophen   Tablet .. 650 milliGRAM(s) Oral every 6 hours PRN Temp greater or equal to 38C (100.4F), Moderate Pain (4 - 6)  ALBUTerol    90 MICROgram(s) HFA Inhaler 2 Puff(s) Inhalation every 6 hours PRN Shortness of Breath and/or Wheezing  ondansetron Injectable 4 milliGRAM(s) IV Push every 6 hours PRN Nausea and/or Vomiting  polyethylene glycol 3350 17 Gram(s) Oral daily PRN Constipation      SOCIAL HISTORY  Advanced Directives:       [ X ] Full Code       [  ] DNR  Marital Status:         [  ] M      [ X ] S      [  ] D       [  ] W  Children:       [ X ] Yes      [  ] No  Occupation:        [  ] Employed       [ X ] Unemployed       [  ] Retired  Diet:       [ X ] Regular       [  ] PEG feeding          [  ] NG tube feeding  Drug Use:           [ X ] Patient denied          [  ] Yes  Alcohol:           [ X ] No             [  ] Yes (socially)         [  ] Yes (chronic)  Tobacco:           [  ] Yes           [ X ] No    FAMILY HISTORY  [ X ] Heart Disease            [ X ] Diabetes             [ X ] HTN             [  ] Colon Cancer             [  ] Stomach Cancer              [  ] Pancreatic Cancer    VITAL SIGNS   Vital Signs Last 24 Hrs  T(C): 36.4 (01 Jun 2021 11:33), Max: 36.8 (01 Jun 2021 10:32)  T(F): 97.6 (01 Jun 2021 11:33), Max: 98.3 (01 Jun 2021 10:32)  HR: 87 (01 Jun 2021 11:33) (74 - 87)  BP: 167/80 (01 Jun 2021 11:33) (125/75 - 167/80)   RR: 18 (01 Jun 2021 11:33) (18 - 18)  SpO2: 95% (01 Jun 2021 11:33) (95% - 100%)  Daily Height in cm: 162.56 (01 Jun 2021 07:28)            CBC Full  -  ( 01 Jun 2021 16:18 )  WBC Count : 9.15 K/uL  RBC Count : 3.16 M/uL  Hemoglobin : 8.9 g/dL  Hematocrit : 28.3 %  Platelet Count - Automated : 269 K/uL  Mean Cell Volume : 89.6 fl  Mean Cell Hemoglobin : 28.2 pg  Mean Cell Hemoglobin Concentration : 31.4 gm/dL  Auto Neutrophil # : x  Auto Lymphocyte # : x  Auto Monocyte # : x  Auto Eosinophil # : x  Auto Basophil # : x  Auto Neutrophil % : x  Auto Lymphocyte % : x  Auto Monocyte % : x  Auto Eosinophil % : x  Auto Basophil % : x      06-01    140  |  109<H>  |  16  ----------------------------<  87  4.5   |  23  |  0.75    Ca    9.8      01 Jun 2021 08:22    TPro  5.8<L>  /  Alb  2.6<L>  /  TBili  0.4  /  DBili  x   /  AST  24  /  ALT  14  /  AlkPhos  109  06-01          ALT/SGPT 14  Albumin, Serum 2.6  Alkaline Phosphatase 109  AST/SGOT 24  Bilirubin Direct --  Bilirubin Total 0.4  Indirect Bilirubin --  Hepatitis A Total --  Hepatitis B Core Antibody --  Hepatitis B Surface Antibody --  Hepatitis B Surface Antigen --  Hepatitis C Virus Interpretation --  Hepatitis C Virus Genotype --          PT/INR - ( 01 Jun 2021 08:22 )   PT: 12.3 sec;   INR: 1.04 ratio         PTT - ( 01 Jun 2021 08:22 )  PTT:31.6 sec    RADIOLOGY/IMAGING                           [  ] STAT REQUEST              [ X ] ROUTINE REQUEST    Patient is a 75 year old with rectal bleeding. GI consulted to evaluate.        HPI:  Patient is a 75 year old female from [place], with past medical history significant for Parkinson's, COPD, diverticulosis, HTN, DM and HLD, who presented to the ED due to rectal bleed. History is limited from patient due to her being a poor historian. As per ED provider note, patient was sent to the ED for concern for vaginal bleed. In ED, patient is noted to have rectal bleed instead of vaginal bleed. Patient is confused unable to provide any history. No abdominal pain, nausea, vomiting, hematemesis, melena, fever, chills, chest pain, SOB, cough, hematuria, dysuria or diarrhea reported.       PAIN MANAGEMENT:  Pain Scale:                0/10  Pain Location:      Prior Colonoscopy: Unknown    PAST MEDICAL HISTORY  Parkinson disease  COPD  Diverticulosis  HTN (hypertension)  Diabetes mellitus  HLD (hyperlipidemia)        PAST SURGICAL HISTORY  No significant past surgical history        Allergies    No Known Allergies    Intolerances  None       MEDICATIONS  (STANDING):  carbidopa/levodopa/entacapone 12.5/50/200 1 Tablet(s) Oral three times a day  dextrose 5% + sodium chloride 0.9%. 1000 milliLiter(s) (75 mL/Hr) IV Continuous <Continuous>  enalapril 20 milliGRAM(s) Oral two times a day  insulin lispro (ADMELOG) corrective regimen sliding scale   SubCutaneous Before meals and at bedtime  oxybutynin XL 5 milliGRAM(s) Oral daily  pantoprazole  Injectable 40 milliGRAM(s) IV Push two times a day  rasagiline Tablet 1 milliGRAM(s) Oral daily  senna 2 Tablet(s) Oral at bedtime    MEDICATIONS  (PRN):  acetaminophen   Tablet .. 650 milliGRAM(s) Oral every 6 hours PRN Temp greater or equal to 38C (100.4F), Moderate Pain (4 - 6)  ALBUTerol    90 MICROgram(s) HFA Inhaler 2 Puff(s) Inhalation every 6 hours PRN Shortness of Breath and/or Wheezing  ondansetron Injectable 4 milliGRAM(s) IV Push every 6 hours PRN Nausea and/or Vomiting  polyethylene glycol 3350 17 Gram(s) Oral daily PRN Constipation      SOCIAL HISTORY  Advanced Directives:       [ X ] Full Code       [  ] DNR  Marital Status:         [  ] M      [ X ] S      [  ] D       [  ] W  Children:       [ X ] Yes      [  ] No  Occupation:        [  ] Employed       [ X ] Unemployed       [  ] Retired  Diet:       [ X ] Regular       [  ] PEG feeding          [  ] NG tube feeding  Drug Use:           [ X ] Patient denied          [  ] Yes  Alcohol:           [ X ] No             [  ] Yes (socially)         [  ] Yes (chronic)  Tobacco:           [  ] Yes           [ X ] No    FAMILY HISTORY  [ X ] Heart Disease            [ X ] Diabetes             [ X ] HTN             [  ] Colon Cancer             [  ] Stomach Cancer              [  ] Pancreatic Cancer    VITAL SIGNS   Vital Signs Last 24 Hrs  T(C): 36.4 (01 Jun 2021 11:33), Max: 36.8 (01 Jun 2021 10:32)  T(F): 97.6 (01 Jun 2021 11:33), Max: 98.3 (01 Jun 2021 10:32)  HR: 87 (01 Jun 2021 11:33) (74 - 87)  BP: 167/80 (01 Jun 2021 11:33) (125/75 - 167/80)   RR: 18 (01 Jun 2021 11:33) (18 - 18)  SpO2: 95% (01 Jun 2021 11:33) (95% - 100%)  Daily Height in cm: 162.56 (01 Jun 2021 07:28)            CBC Full  -  ( 01 Jun 2021 16:18 )  WBC Count : 9.15 K/uL  RBC Count : 3.16 M/uL  Hemoglobin : 8.9 g/dL  Hematocrit : 28.3 %  Platelet Count - Automated : 269 K/uL  Mean Cell Volume : 89.6 fl  Mean Cell Hemoglobin : 28.2 pg  Mean Cell Hemoglobin Concentration : 31.4 gm/dL  Auto Neutrophil # : x  Auto Lymphocyte # : x  Auto Monocyte # : x  Auto Eosinophil # : x  Auto Basophil # : x  Auto Neutrophil % : x  Auto Lymphocyte % : x  Auto Monocyte % : x  Auto Eosinophil % : x  Auto Basophil % : x      06-01    140  |  109<H>  |  16  ----------------------------<  87  4.5   |  23  |  0.75    Ca    9.8      01 Jun 2021 08:22    TPro  5.8<L>  /  Alb  2.6<L>  /  TBili  0.4  /  DBili  x   /  AST  24  /  ALT  14  /  AlkPhos  109  06-01     PT/INR - ( 01 Jun 2021 08:22 )   PT: 12.3 sec;   INR: 1.04 ratio       PTT - ( 01 Jun 2021 08:22 )  PTT:31.6 sec    < from: 12 Lead ECG (05.18.21 @ 10:17) >  Ventricular Rate 78 BPM    Atrial Rate 78 BPM    P-R Interval 118 ms    QRS Duration 78 ms    Q-T Interval 372 ms    QTC Calculation(Bazett) 424 ms    P Axis 61 degrees    R Axis 8 degrees    T Axis -18 degrees    Diagnosis Line *** Poor data quality, interpretation may be adversely affected  Normal sinus rhythm  Voltage criteria for left ventricular hypertrophy  Nonspecific ST abnormality  Abnormal ECG       RADIOLOGY/IMAGING                  EXAM:  CT ABDOMEN AND PELVIS IC                            PROCEDURE DATE:  06/01/2021          INTERPRETATION:  CLINICAL INFORMATION: Rectal bleeding    COMPARISON: None.    CONTRAST/COMPLICATIONS:  IV Contrast: Omnipaque 350  90 cc [id number] cc discarded  Oral Contrast: NONE  Complications: None reported at time of study completion    PROCEDURE:  CT of the Abdomen and Pelvis was performed.  Sagittal and coronal reformats were performed.    FINDINGS:  LOWER CHEST: Clear lung bases.    LIVER: Multiple subcentimeter hepatic hypodensities, too small to further characterize.  BILE DUCTS: Normal caliber.  GALLBLADDER: Cholelithiasis.  SPLEEN: Within normal limits.  PANCREAS: Mildly atrophic.  ADRENALS: Within normal limits.  KIDNEYS/URETERS: Subcentimeter renal hypodensities, too small to further characterize. No hydronephrosis.    BLADDER: Within normal limits.  REPRODUCTIVE ORGANS: Uterus and left adnexa within normal limits. 3.7 x 2.8 cm right adnexal mass.    BOWEL: No bowel obstruction. Appendix is normal. Colonic diverticulosis. Rectal wall thickening.  PERITONEUM: Trace pelvic fluid.  VESSELS: Atherosclerotic changes. Filling defects noted within the left common femoral, femoral, and deep femoral veins.  RETROPERITONEUM/LYMPH NODES: No lymphadenopathy.  ABDOMINAL WALL: Small fat-containing left inguinal hernia.  BONES: Degenerative changes. Severe compression deformity of L2. Fixation cement noted within the L2 vertebral body. Mild wedge deformity of T11.    IMPRESSION:    1. Rectal wall thickening, question proctitis.  2. Colonic diverticulosis without CT evidence of acute diverticulitis. No bowel obstruction.  3. Right adnexal mass (3.7 x 2.8 cm). Recommend pelvic ultrasound for further assessment.  4. Deep venous thrombosis within the imaged left lower extremity (left common femoral, femoral, and deep femoral veins).

## 2021-06-01 NOTE — ED ADULT NURSE NOTE - OBJECTIVE STATEMENT
Pt awake, Aox1, BIB EMS from NH c/o vaginal bleed since last night. Pt also c/o left leg pain. Pt received in bed, actively bleed noted from rectum. Pt denies abdominal pain, CP, SOB.

## 2021-06-02 LAB
24R-OH-CALCIDIOL SERPL-MCNC: 28.9 NG/ML — LOW (ref 30–80)
A1C WITH ESTIMATED AVERAGE GLUCOSE RESULT: 5.7 % — HIGH (ref 4–5.6)
ANION GAP SERPL CALC-SCNC: 6 MMOL/L — SIGNIFICANT CHANGE UP (ref 5–17)
APTT BLD: 28.8 SEC — SIGNIFICANT CHANGE UP (ref 27.5–35.5)
BUN SERPL-MCNC: 12 MG/DL — SIGNIFICANT CHANGE UP (ref 7–18)
CALCIUM SERPL-MCNC: 8.9 MG/DL — SIGNIFICANT CHANGE UP (ref 8.4–10.5)
CANCER AG125 SERPL-ACNC: 10 U/ML — SIGNIFICANT CHANGE UP
CANCER AG19-9 SERPL-ACNC: <2 U/ML — SIGNIFICANT CHANGE UP
CEA SERPL-MCNC: 2 NG/ML — SIGNIFICANT CHANGE UP (ref 0–3.8)
CHLORIDE SERPL-SCNC: 113 MMOL/L — HIGH (ref 96–108)
CHOLEST SERPL-MCNC: 138 MG/DL — SIGNIFICANT CHANGE UP
CO2 SERPL-SCNC: 22 MMOL/L — SIGNIFICANT CHANGE UP (ref 22–31)
COVID-19 SPIKE DOMAIN AB INTERP: POSITIVE
COVID-19 SPIKE DOMAIN ANTIBODY RESULT: 6.17 U/ML — HIGH
CREAT SERPL-MCNC: 0.59 MG/DL — SIGNIFICANT CHANGE UP (ref 0.5–1.3)
ESTIMATED AVERAGE GLUCOSE: 117 MG/DL — HIGH (ref 68–114)
FERRITIN SERPL-MCNC: 451 NG/ML — HIGH (ref 15–150)
FOLATE SERPL-MCNC: 8.1 NG/ML — SIGNIFICANT CHANGE UP
GLUCOSE BLDC GLUCOMTR-MCNC: 103 MG/DL — HIGH (ref 70–99)
GLUCOSE BLDC GLUCOMTR-MCNC: 105 MG/DL — HIGH (ref 70–99)
GLUCOSE BLDC GLUCOMTR-MCNC: 115 MG/DL — HIGH (ref 70–99)
GLUCOSE BLDC GLUCOMTR-MCNC: 158 MG/DL — HIGH (ref 70–99)
GLUCOSE BLDC GLUCOMTR-MCNC: 166 MG/DL — HIGH (ref 70–99)
GLUCOSE SERPL-MCNC: 127 MG/DL — HIGH (ref 70–99)
HCT VFR BLD CALC: 24.9 % — LOW (ref 34.5–45)
HDLC SERPL-MCNC: 39 MG/DL — LOW
HGB BLD-MCNC: 8 G/DL — LOW (ref 11.5–15.5)
INR BLD: 1.09 RATIO — SIGNIFICANT CHANGE UP (ref 0.88–1.16)
IRON SATN MFR SERPL: 20 % — SIGNIFICANT CHANGE UP (ref 15–50)
IRON SATN MFR SERPL: 33 UG/DL — LOW (ref 40–150)
LIPID PNL WITH DIRECT LDL SERPL: 81 MG/DL — SIGNIFICANT CHANGE UP
MAGNESIUM SERPL-MCNC: 2 MG/DL — SIGNIFICANT CHANGE UP (ref 1.6–2.6)
MCHC RBC-ENTMCNC: 28.2 PG — SIGNIFICANT CHANGE UP (ref 27–34)
MCHC RBC-ENTMCNC: 32.1 GM/DL — SIGNIFICANT CHANGE UP (ref 32–36)
MCV RBC AUTO: 87.7 FL — SIGNIFICANT CHANGE UP (ref 80–100)
NON HDL CHOLESTEROL: 99 MG/DL — SIGNIFICANT CHANGE UP
NRBC # BLD: 0 /100 WBCS — SIGNIFICANT CHANGE UP (ref 0–0)
PHOSPHATE SERPL-MCNC: 2.7 MG/DL — SIGNIFICANT CHANGE UP (ref 2.5–4.5)
PLATELET # BLD AUTO: 256 K/UL — SIGNIFICANT CHANGE UP (ref 150–400)
POTASSIUM SERPL-MCNC: 3.7 MMOL/L — SIGNIFICANT CHANGE UP (ref 3.5–5.3)
POTASSIUM SERPL-SCNC: 3.7 MMOL/L — SIGNIFICANT CHANGE UP (ref 3.5–5.3)
PROTHROM AB SERPL-ACNC: 12.9 SEC — SIGNIFICANT CHANGE UP (ref 10.6–13.6)
RBC # BLD: 2.84 M/UL — LOW (ref 3.8–5.2)
RBC # FLD: 15.6 % — HIGH (ref 10.3–14.5)
SARS-COV-2 IGG+IGM SERPL QL IA: 6.17 U/ML — HIGH
SARS-COV-2 IGG+IGM SERPL QL IA: POSITIVE
SODIUM SERPL-SCNC: 141 MMOL/L — SIGNIFICANT CHANGE UP (ref 135–145)
TIBC SERPL-MCNC: 169 UG/DL — LOW (ref 250–450)
TRIGL SERPL-MCNC: 92 MG/DL — SIGNIFICANT CHANGE UP
UIBC SERPL-MCNC: 136 UG/DL — SIGNIFICANT CHANGE UP (ref 110–370)
VIT B12 SERPL-MCNC: 445 PG/ML — SIGNIFICANT CHANGE UP (ref 232–1245)
WBC # BLD: 6.27 K/UL — SIGNIFICANT CHANGE UP (ref 3.8–10.5)
WBC # FLD AUTO: 6.27 K/UL — SIGNIFICANT CHANGE UP (ref 3.8–10.5)

## 2021-06-02 RX ORDER — SODIUM CHLORIDE 9 MG/ML
1000 INJECTION, SOLUTION INTRAVENOUS
Refills: 0 | Status: DISCONTINUED | OUTPATIENT
Start: 2021-06-02 | End: 2021-06-04

## 2021-06-02 RX ORDER — SOD SULF/SODIUM/NAHCO3/KCL/PEG
4000 SOLUTION, RECONSTITUTED, ORAL ORAL ONCE
Refills: 0 | Status: DISCONTINUED | OUTPATIENT
Start: 2021-06-02 | End: 2021-06-03

## 2021-06-02 RX ADMIN — CARBIDOPA, LEVODOPA, AND ENTACAPONE 1 TABLET(S): 50; 200; 200 TABLET, FILM COATED ORAL at 05:45

## 2021-06-02 RX ADMIN — PANTOPRAZOLE SODIUM 40 MILLIGRAM(S): 20 TABLET, DELAYED RELEASE ORAL at 05:42

## 2021-06-02 RX ADMIN — CARBIDOPA, LEVODOPA, AND ENTACAPONE 1 TABLET(S): 50; 200; 200 TABLET, FILM COATED ORAL at 22:50

## 2021-06-02 RX ADMIN — PANTOPRAZOLE SODIUM 40 MILLIGRAM(S): 20 TABLET, DELAYED RELEASE ORAL at 18:06

## 2021-06-02 RX ADMIN — SODIUM CHLORIDE 75 MILLILITER(S): 9 INJECTION, SOLUTION INTRAVENOUS at 05:42

## 2021-06-02 RX ADMIN — Medication 1: at 08:30

## 2021-06-02 RX ADMIN — Medication 20 MILLIGRAM(S): at 18:06

## 2021-06-02 RX ADMIN — SODIUM CHLORIDE 75 MILLILITER(S): 9 INJECTION, SOLUTION INTRAVENOUS at 12:51

## 2021-06-02 RX ADMIN — SENNA PLUS 2 TABLET(S): 8.6 TABLET ORAL at 22:50

## 2021-06-02 RX ADMIN — Medication 20 MILLIGRAM(S): at 05:46

## 2021-06-02 NOTE — CHART NOTE - NSCHARTNOTEFT_GEN_A_CORE
EVENT: Paged by RN, pt is refusing to drink Golytely.     HPI:  75 year old female from [place], with PMH of Parkinson's, COPD, diverticulosis, HTN, DM and HLD, who presented to the ED due to rectal bleed. Hgb of 9.8. COVID negative. CT abdomen showing rectal wall thickening, colonic diverticulosis without diverticulitis, right adnexal mass and DVT noted in LLE (left common femoral, femoral and deep femoral vein. Pt is admitted for GI bleed LE DVT (no AC at this time due to GIB).     SUBJECTIVE: Seen at bedside, A & O x 1 (to person, only), cognitively impaired-unable to be reoriented.     OBJECTIVE:  Vital Signs Last 24 Hrs  T(C): 36.7 (01 Jun 2021 21:44), Max: 36.8 (01 Jun 2021 10:32)  T(F): 98.1 (01 Jun 2021 21:44), Max: 98.3 (01 Jun 2021 10:32)  HR: 84 (01 Jun 2021 21:44) (74 - 87)  BP: 126/73 (01 Jun 2021 21:44) (125/75 - 167/80)  BP(mean): --  RR: 18 (01 Jun 2021 21:44) (18 - 18)  SpO2: 96% (01 Jun 2021 21:44) (95% - 100%)    PHYSICAL EXAM:  General: frail, cachetic   Neuro: Awake and alert, oriented to person  Cardiovascular: + S1, S2, no murmurs, rubs, or bruits  Respiratory: clear to auscultation bilaterally with good air entry   GI: Abdomen soft, non-tender, bowel sounds present   : Non distended;   Skin: warm and dry; no rash      LABS:                        8.7    7.91  )-----------( 280      ( 01 Jun 2021 23:06 )             27.3     06-01    140  |  109<H>  |  16  ----------------------------<  87  4.5   |  23  |  0.75    Ca    9.8      01 Jun 2021 08:22    TPro  5.8<L>  /  Alb  2.6<L>  /  TBili  0.4  /  DBili  x   /  AST  24  /  ALT  14  /  AlkPhos  109  06-01        EKG:   IMAGING:    ASSESSMENT: Anemia likely due to GI bleed    PLAN:     -CBC q6  -Continue IV PPI  -Continue current/supportive care  -GI, Dr. Galvez    FOLLOW UP / RESULT:    -GI in AM regarding inability to prep patient for possible colonoscopy

## 2021-06-02 NOTE — CONSULT NOTE ADULT - ASSESSMENT
75 year old lady presented with rectal bleeding.  CT showed rectal thickening, adnexal mass, and DVT.    1. rectal bleeding. will do sigmoidoscopy  please give her enema  discussed with Dr. Galvez    2. adnexal mass  will do transvaginal ultrasound  check ca 125    3. DVT  not on AC yet due to rectal bleeding  no bleeding today so far  for sigmoidoscopy  if there is a significant lesion in rectum, she will need IVC filter.  otherwise, she can start heparin if bleeding can be stopped in sigmoidoscopy.

## 2021-06-02 NOTE — CHART NOTE - NSCHARTNOTEFT_GEN_A_CORE
Spoke to GI Dr. Galvez, sigmoidoscopy  was unsuccessful today due to poor bowel prep, pt will require NGT for Golytely and for Colonoscopy tomorrow.

## 2021-06-02 NOTE — PROGRESS NOTE ADULT - ASSESSMENT
75 year old female from [place], with PMH of Parkinson's, COPD, diverticulosis, HTN, DM and HLD, who presented to the ED due to rectal bleed. PT was admitted for medicine for suspected GI bleed. hg stable. CT abdomen showing rectal wall thickening, colonic diverticulosis without diverticulitis, right adnexal mass and DVT noted in LLE (left common femoral, femoral and deep femoral veins).  GI Dr. Galvez consulted and pt was recommended for colonoscopy however pt was unable to complete bowel prep, Plan is for pt to receive tap water enema and then have sigmoidoscopy today   AC was kept on hold due to suspected GIB

## 2021-06-02 NOTE — PROGRESS NOTE ADULT - SUBJECTIVE AND OBJECTIVE BOX
Patient is a 75y old  Female who presents with a chief complaint of rectal bleed (02 Jun 2021 10:26)    OVERNIGHT EVENTS: pt refused bowel prep overnight, no melena overnight   MEDICATIONS  (STANDING):  carbidopa/levodopa/entacapone 12.5/50/200 1 Tablet(s) Oral three times a day  dextrose 5% + sodium chloride 0.9%. 1000 milliLiter(s) (75 mL/Hr) IV Continuous <Continuous>  enalapril 20 milliGRAM(s) Oral two times a day  insulin lispro (ADMELOG) corrective regimen sliding scale   SubCutaneous Before meals and at bedtime  oxybutynin XL 5 milliGRAM(s) Oral daily  pantoprazole  Injectable 40 milliGRAM(s) IV Push two times a day  rasagiline Tablet 1 milliGRAM(s) Oral daily  senna 2 Tablet(s) Oral at bedtime    MEDICATIONS  (PRN):  acetaminophen   Tablet .. 650 milliGRAM(s) Oral every 6 hours PRN Temp greater or equal to 38C (100.4F), Moderate Pain (4 - 6)  ALBUTerol    90 MICROgram(s) HFA Inhaler 2 Puff(s) Inhalation every 6 hours PRN Shortness of Breath and/or Wheezing  ondansetron Injectable 4 milliGRAM(s) IV Push every 6 hours PRN Nausea and/or Vomiting  polyethylene glycol 3350 17 Gram(s) Oral daily PRN Constipation        REVIEW OF SYSTEMS: RAHUL due to mental status       T(C): 36.7 (06-02-21 @ 05:17), Max: 36.7 (06-01-21 @ 21:44)  HR: 73 (06-02-21 @ 05:17) (73 - 87)  BP: 128/72 (06-02-21 @ 05:17) (126/73 - 167/80)  RR: 16 (06-02-21 @ 05:17) (16 - 18)  SpO2: 96% (06-02-21 @ 05:17) (95% - 96%)  Wt(kg): --Vital Signs Last 24 Hrs  T(C): 36.7 (02 Jun 2021 05:17), Max: 36.7 (01 Jun 2021 21:44)  T(F): 98 (02 Jun 2021 05:17), Max: 98.1 (01 Jun 2021 21:44)  HR: 73 (02 Jun 2021 05:17) (73 - 87)  BP: 128/72 (02 Jun 2021 05:17) (126/73 - 167/80)  BP(mean): --  RR: 16 (02 Jun 2021 05:17) (16 - 18)  SpO2: 96% (02 Jun 2021 05:17) (95% - 96%)        PHYSICAL EXAM:  GENERAL: frail, b/l temporal wasting, lethargic   EYES: clear conjunctiva  ENMT: Moist mucous membranes  NECK: Supple, No JVD  CHEST/LUNG: Clear to diminished bilaterally; No rales, rhonchi, wheezing, or rubs  HEART: S1, S2, Regular rate and rhythm  ABDOMEN: Soft, Nontender, Nondistended; Bowel sounds present  NEURO: Alert & Oriented to person only, lethargic   EXTREMITIES: +2 LE edema L>R   SKIN: No rashes or lesions      LABS:                        8.0    6.27  )-----------( 256      ( 02 Jun 2021 06:24 )             24.9     06-02    141  |  113<H>  |  12  ----------------------------<  127<H>  3.7   |  22  |  0.59    Ca    8.9      02 Jun 2021 06:23  Phos  2.7     06-02  Mg     2.0     06-02    TPro  5.8<L>  /  Alb  2.6<L>  /  TBili  0.4  /  DBili  x   /  AST  24  /  ALT  14  /  AlkPhos  109  06-01    PT/INR - ( 02 Jun 2021 06:23 )   PT: 12.9 sec;   INR: 1.09 ratio         PTT - ( 02 Jun 2021 06:23 )  PTT:28.8 sec  CAPILLARY BLOOD GLUCOSE    POCT Blood Glucose.: 166 mg/dL (02 Jun 2021 08:07)  POCT Blood Glucose.: 158 mg/dL (02 Jun 2021 05:38)  POCT Blood Glucose.: 91 mg/dL (01 Jun 2021 21:40)  POCT Blood Glucose.: 71 mg/dL (01 Jun 2021 17:43)  POCT Blood Glucose.: 96 mg/dL (01 Jun 2021 13:00)  POCT Blood Glucose.: 57 mg/dL (01 Jun 2021 12:22)  POCT Blood Glucose.: 39 mg/dL (01 Jun 2021 12:19)    RADIOLOGY & ADDITIONAL TESTS:    < from: CT Abdomen and Pelvis w/ IV Cont (06.01.21 @ 10:11) >    EXAM:  CT ABDOMEN AND PELVIS IC                            PROCEDURE DATE:  06/01/2021          INTERPRETATION:  CLINICAL INFORMATION: Rectal bleeding    COMPARISON: None.    CONTRAST/COMPLICATIONS:  IV Contrast: Omnipaque 350  90 cc [id number] cc discarded  Oral Contrast: NONE  Complications: None reported at time of study completion    PROCEDURE:  CT of the Abdomen and Pelvis was performed.  Sagittal and coronal reformats were performed.    FINDINGS:  LOWER CHEST: Clear lung bases.    LIVER: Multiple subcentimeter hepatic hypodensities, too small to further characterize.  BILE DUCTS: Normal caliber.  GALLBLADDER: Cholelithiasis.  SPLEEN: Within normal limits.  PANCREAS: Mildly atrophic.  ADRENALS: Within normal limits.  KIDNEYS/URETERS: Subcentimeter renal hypodensities, too small to further characterize. No hydronephrosis.    BLADDER: Within normal limits.  REPRODUCTIVE ORGANS: Uterus and left adnexa within normal limits. 3.7 x 2.8 cm right adnexal mass.    BOWEL: No bowel obstruction. Appendix is normal. Colonic diverticulosis. Rectal wall thickening.  PERITONEUM: Trace pelvic fluid.  VESSELS: Atherosclerotic changes. Filling defects noted within the left common femoral, femoral, and deep femoral veins.  RETROPERITONEUM/LYMPH NODES: No lymphadenopathy.  ABDOMINAL WALL: Small fat-containing left inguinal hernia.  BONES: Degenerative changes. Severe compression deformity of L2. Fixation cement noted within the L2 vertebral body. Mild wedge deformity of T11.    IMPRESSION:    1. Rectal wall thickening, question proctitis.  2. Colonic diverticulosis without CT evidence of acute diverticulitis. No bowel obstruction.  3. Right adnexal mass (3.7 x 2.8 cm). Recommend pelvic ultrasound for further assessment.  4. Deep venous thrombosis within the imaged left lower extremity (left common femoral, femoral, and deep femoral veins).    Findings were discussed with Dr. Arellano by telephone on 6/1/2021 at 1035 hours.        < end of copied text >    < from: US Duplex Venous Lower Ext Complete, Bilateral (06.01.21 @ 14:06) >    EXAM:  US DPLX LWR EXT VEINS COMPL BI                            PROCEDURE DATE:  06/01/2021          INTERPRETATION:  CLINICAL INFORMATION: History of knee surgery    COMPARISON: None available.    TECHNIQUE: Duplex sonography of the BILATERAL LOWER extremity veins with color and spectral Doppler, with and without compression.    FINDINGS:    RIGHT:  Normal compressibility of the RIGHT common femoral vein. Noncompressible thrombi are noted in the peroneal vein, popliteal vein and lower femoralvein, compatible with deep vein thrombosis.    LEFT: The patient refused examination of the left leg.    IMPRESSION:  Deep vein thrombosis in the right leg as above.  Dr. Arellano is aware.    The patient refused examination of the left leg.      < end of copied text >      Imaging Personally Reviewed:  [ ] YES  [ ] NO

## 2021-06-02 NOTE — PROGRESS NOTE ADULT - PROBLEM SELECTOR PLAN 1
-sent from NH for rectal bleeding   -no acute sign or symptoms of bleeding  -CT as above   -pt was scheduled for colonoscopy today however pt was unable to complete bowel prep, Plan is for pt to receive tap water enema and then have sigmoidoscopy today   -cont PPI   -mon CBC   -Transfuse PRN if Hg <7, consent in chart   -GI Dr. Galvez

## 2021-06-02 NOTE — PROGRESS NOTE ADULT - ASSESSMENT
75 year old female from [place], with PMH of Parkinson's, COPD, diverticulosis, HTN, DM and HLD, who presented to the ED due to rectal bleed. PT was admitted for medicine for suspected GI bleed. hg stable. CT abdomen showing rectal wall thickening, colonic diverticulosis without diverticulitis, right adnexal mass and DVT noted in LLE (left common femoral, femoral and deep femoral veins).  GI Dr. Galvez consulted and pt was recommended for colonoscopy however pt was unable to complete bowel prep, Plan is for pt to receive tap water enema and then have sigmoidoscopy today   AC was kept on hold due to suspected GIB

## 2021-06-02 NOTE — PROGRESS NOTE ADULT - SUBJECTIVE AND OBJECTIVE BOX
Patient is a 75y old  Female who presents with a chief complaint of rectal bleed (02 Jun 2021 10:26)    OVERNIGHT EVENTS: pt refused bowel prep overnight, no melena overnight         REVIEW OF SYSTEMS: RAHUL due to mental status       T(C): 36.7 (06-02-21 @ 05:17), Max: 36.7 (06-01-21 @ 21:44)  HR: 73 (06-02-21 @ 05:17) (73 - 87)  BP: 128/72 (06-02-21 @ 05:17) (126/73 - 167/80)  RR: 16 (06-02-21 @ 05:17) (16 - 18)  SpO2: 96% (06-02-21 @ 05:17) (95% - 96%)  Wt(kg): --Vital Signs Last 24 Hrs  T(C): 36.7 (02 Jun 2021 05:17), Max: 36.7 (01 Jun 2021 21:44)  T(F): 98 (02 Jun 2021 05:17), Max: 98.1 (01 Jun 2021 21:44)  HR: 73 (02 Jun 2021 05:17) (73 - 87)  BP: 128/72 (02 Jun 2021 05:17) (126/73 - 167/80)  BP(mean): --  RR: 16 (02 Jun 2021 05:17) (16 - 18)  SpO2: 96% (02 Jun 2021 05:17) (95% - 96%)    MEDICATIONS  (STANDING):  carbidopa/levodopa/entacapone 12.5/50/200 1 Tablet(s) Oral three times a day  dextrose 5% + sodium chloride 0.9%. 1000 milliLiter(s) (75 mL/Hr) IV Continuous <Continuous>  enalapril 20 milliGRAM(s) Oral two times a day  insulin lispro (ADMELOG) corrective regimen sliding scale   SubCutaneous Before meals and at bedtime  oxybutynin XL 5 milliGRAM(s) Oral daily  pantoprazole  Injectable 40 milliGRAM(s) IV Push two times a day  rasagiline Tablet 1 milliGRAM(s) Oral daily  senna 2 Tablet(s) Oral at bedtime    MEDICATIONS  (PRN):  acetaminophen   Tablet .. 650 milliGRAM(s) Oral every 6 hours PRN Temp greater or equal to 38C (100.4F), Moderate Pain (4 - 6)  ALBUTerol    90 MICROgram(s) HFA Inhaler 2 Puff(s) Inhalation every 6 hours PRN Shortness of Breath and/or Wheezing  ondansetron Injectable 4 milliGRAM(s) IV Push every 6 hours PRN Nausea and/or Vomiting  polyethylene glycol 3350 17 Gram(s) Oral daily PRN Constipation      PHYSICAL EXAM:  GENERAL: frail, b/l temporal wasting, lethargic   EYES: clear conjunctiva  ENMT: Moist mucous membranes  NECK: Supple, No JVD  CHEST/LUNG: Clear to diminished bilaterally; No rales, rhonchi, wheezing, or rubs  HEART: S1, S2, Regular rate and rhythm  ABDOMEN: Soft, Nontender, Nondistended; Bowel sounds present  NEURO: Alert & Oriented to person only, lethargic   EXTREMITIES: +2 LE edema L>R   SKIN: No rashes or lesions    Consultant(s) Notes Reviewed:  [x ] YES  [ ] NO  Care Discussed with Consultants/Other Providers [ x] YES  [ ] NO    LABS:                        8.0    6.27  )-----------( 256      ( 02 Jun 2021 06:24 )             24.9     06-02    141  |  113<H>  |  12  ----------------------------<  127<H>  3.7   |  22  |  0.59    Ca    8.9      02 Jun 2021 06:23  Phos  2.7     06-02  Mg     2.0     06-02    TPro  5.8<L>  /  Alb  2.6<L>  /  TBili  0.4  /  DBili  x   /  AST  24  /  ALT  14  /  AlkPhos  109  06-01    PT/INR - ( 02 Jun 2021 06:23 )   PT: 12.9 sec;   INR: 1.09 ratio         PTT - ( 02 Jun 2021 06:23 )  PTT:28.8 sec  CAPILLARY BLOOD GLUCOSE    POCT Blood Glucose.: 166 mg/dL (02 Jun 2021 08:07)  POCT Blood Glucose.: 158 mg/dL (02 Jun 2021 05:38)  POCT Blood Glucose.: 91 mg/dL (01 Jun 2021 21:40)  POCT Blood Glucose.: 71 mg/dL (01 Jun 2021 17:43)  POCT Blood Glucose.: 96 mg/dL (01 Jun 2021 13:00)  POCT Blood Glucose.: 57 mg/dL (01 Jun 2021 12:22)  POCT Blood Glucose.: 39 mg/dL (01 Jun 2021 12:19)    RADIOLOGY & ADDITIONAL TESTS:    < from: CT Abdomen and Pelvis w/ IV Cont (06.01.21 @ 10:11) >    EXAM:  CT ABDOMEN AND PELVIS IC                            PROCEDURE DATE:  06/01/2021          INTERPRETATION:  CLINICAL INFORMATION: Rectal bleeding    COMPARISON: None.    CONTRAST/COMPLICATIONS:  IV Contrast: Omnipaque 350  90 cc [id number] cc discarded  Oral Contrast: NONE  Complications: None reported at time of study completion    PROCEDURE:  CT of the Abdomen and Pelvis was performed.  Sagittal and coronal reformats were performed.    FINDINGS:  LOWER CHEST: Clear lung bases.    LIVER: Multiple subcentimeter hepatic hypodensities, too small to further characterize.  BILE DUCTS: Normal caliber.  GALLBLADDER: Cholelithiasis.  SPLEEN: Within normal limits.  PANCREAS: Mildly atrophic.  ADRENALS: Within normal limits.  KIDNEYS/URETERS: Subcentimeter renal hypodensities, too small to further characterize. No hydronephrosis.    BLADDER: Within normal limits.  REPRODUCTIVE ORGANS: Uterus and left adnexa within normal limits. 3.7 x 2.8 cm right adnexal mass.    BOWEL: No bowel obstruction. Appendix is normal. Colonic diverticulosis. Rectal wall thickening.  PERITONEUM: Trace pelvic fluid.  VESSELS: Atherosclerotic changes. Filling defects noted within the left common femoral, femoral, and deep femoral veins.  RETROPERITONEUM/LYMPH NODES: No lymphadenopathy.  ABDOMINAL WALL: Small fat-containing left inguinal hernia.  BONES: Degenerative changes. Severe compression deformity of L2. Fixation cement noted within the L2 vertebral body. Mild wedge deformity of T11.    IMPRESSION:    1. Rectal wall thickening, question proctitis.  2. Colonic diverticulosis without CT evidence of acute diverticulitis. No bowel obstruction.  3. Right adnexal mass (3.7 x 2.8 cm). Recommend pelvic ultrasound for further assessment.  4. Deep venous thrombosis within the imaged left lower extremity (left common femoral, femoral, and deep femoral veins).    Findings were discussed with Dr. Arellano by telephone on 6/1/2021 at 1035 hours.        < end of copied text >    < from: US Duplex Venous Lower Ext Complete, Bilateral (06.01.21 @ 14:06) >    EXAM:  US DPLX LWR EXT VEINS COMPL BI                            PROCEDURE DATE:  06/01/2021          INTERPRETATION:  CLINICAL INFORMATION: History of knee surgery    COMPARISON: None available.    TECHNIQUE: Duplex sonography of the BILATERAL LOWER extremity veins with color and spectral Doppler, with and without compression.    FINDINGS:    RIGHT:  Normal compressibility of the RIGHT common femoral vein. Noncompressible thrombi are noted in the peroneal vein, popliteal vein and lower femoralvein, compatible with deep vein thrombosis.    LEFT: The patient refused examination of the left leg.    IMPRESSION:  Deep vein thrombosis in the right leg as above.  Dr. Arellano is aware.    The patient refused examination of the left leg.      < end of copied text >      Imaging Personally Reviewed:  [ ] YES  [ ] NO

## 2021-06-02 NOTE — PROGRESS NOTE ADULT - SUBJECTIVE AND OBJECTIVE BOX
Colonoscopy Report  Indication: Hematochezia   Referring: Mike Galvez MD  Instrument:  # [id number]  Anesthesia: MAC  Consent:  Informed consent was obtained from the patient after providing any opportunity for questions  Procedure: After placing the patient in the left lateral decubitus position, the colonoscope was gently inserted into the rectum and advanced only to the rectum due to poor prep. The patient tolerated the procedure well. After completion of the exam, the patient was transferred to the recovery room.     Preparation:  Findings:   Anal Canal	        Large internal hemorrhoids  Rectum	                Large amount of stool and blood clots  Sigmoid Colon 	        Not intubated  Descending Colon	Not intubated  Splenic Flexure	Not intubated  Transverse Colon	Not intubated  Hepatic Flexure	Not intubated  Ascending Colon	 Not intubated  Cecum	                 Not intubated  Ileo-cecal Valve	    Ileum 	                 Not intubated     EBL:0    Impression:     1. Poor prep    Plan:  1. Repeat prep  2. Repeat colonoscopy      Procedure Start Time:  15:39  Cecum Reached Time:   Procedure End Time:   15:47  Total Withdrawal Time:       Attending:     Mike Galvez MD

## 2021-06-02 NOTE — CONSULT NOTE ADULT - SUBJECTIVE AND OBJECTIVE BOX
Patient is a 75y old  Female who presents with a chief complaint of rectal bleed (01 Jun 2021 20:08)      HPI:  Patient is a 75 year old female from [place], with PMH of Parkinson's, COPD, diverticulosis, HTN, DM and HLD, who presented to the ED due to rectal bleed. History is limited from patient due to her being a poor historian. As per ED provider note, patient was sent to the ED for concern for vaginal bleed. In ED, patient is noted to have rectal bleed instead of vaginal bleed. Spoke to daughter, Batool Johnson ([phone]) for further history. Daughter states she was told that patient was sent to ED for concern for vaginal bleed. Does not have any other concerns or complaints at this time.  (01 Jun 2021 12:06)  CT showed adnexal mass, rectal thickening, and DVT.       ROS:  Negative except for:    PAST MEDICAL & SURGICAL HISTORY:  Parkinson disease    History of COPD    Diverticulosis    HTN (hypertension)    Diabetes mellitus    HLD (hyperlipidemia)    No significant past surgical history        SOCIAL HISTORY:    FAMILY HISTORY:      MEDICATIONS  (STANDING):  carbidopa/levodopa/entacapone 12.5/50/200 1 Tablet(s) Oral three times a day  dextrose 5% + sodium chloride 0.9%. 1000 milliLiter(s) (75 mL/Hr) IV Continuous <Continuous>  enalapril 20 milliGRAM(s) Oral two times a day  insulin lispro (ADMELOG) corrective regimen sliding scale   SubCutaneous Before meals and at bedtime  oxybutynin XL 5 milliGRAM(s) Oral daily  pantoprazole  Injectable 40 milliGRAM(s) IV Push two times a day  rasagiline Tablet 1 milliGRAM(s) Oral daily  senna 2 Tablet(s) Oral at bedtime    MEDICATIONS  (PRN):  acetaminophen   Tablet .. 650 milliGRAM(s) Oral every 6 hours PRN Temp greater or equal to 38C (100.4F), Moderate Pain (4 - 6)  ALBUTerol    90 MICROgram(s) HFA Inhaler 2 Puff(s) Inhalation every 6 hours PRN Shortness of Breath and/or Wheezing  ondansetron Injectable 4 milliGRAM(s) IV Push every 6 hours PRN Nausea and/or Vomiting  polyethylene glycol 3350 17 Gram(s) Oral daily PRN Constipation      Allergies    No Known Allergies    Intolerances        Vital Signs Last 24 Hrs  T(C): 36.7 (02 Jun 2021 05:17), Max: 36.7 (01 Jun 2021 21:44)  T(F): 98 (02 Jun 2021 05:17), Max: 98.1 (01 Jun 2021 21:44)  HR: 73 (02 Jun 2021 05:17) (73 - 87)  BP: 128/72 (02 Jun 2021 05:17) (126/73 - 167/80)  BP(mean): --  RR: 16 (02 Jun 2021 05:17) (16 - 18)  SpO2: 96% (02 Jun 2021 05:17) (95% - 96%)    PHYSICAL EXAM  General: adult in NAD  HEENT: clear oropharynx, anicteric sclera, pink conjunctiva  Neck: supple  CV: normal S1/S2 with no murmur rubs or gallops  Lungs: positive air movement b/l ant lungs,clear to auscultation, no wheezes, no rales  Abdomen: soft non-tender non-distended, no hepatosplenomegaly  Ext: no clubbing cyanosis or edema  Skin: no rashes and no petechiae  Neuro: alert and oriented X 4, no focal deficits      LABS:                          8.0    6.27  )-----------( 256      ( 02 Jun 2021 06:24 )             24.9         Mean Cell Volume : 87.7 fl  Mean Cell Hemoglobin : 28.2 pg  Mean Cell Hemoglobin Concentration : 32.1 gm/dL  Auto Neutrophil # : x  Auto Lymphocyte # : x  Auto Monocyte # : x  Auto Eosinophil # : x  Auto Basophil # : x  Auto Neutrophil % : x  Auto Lymphocyte % : x  Auto Monocyte % : x  Auto Eosinophil % : x  Auto Basophil % : x      Serial CBC's  06-02 @ 06:24  Hct-24.9 / Hgb-8.0 / Plat-256 / RBC-2.84 / WBC-6.27  Serial CBC's  06-01 @ 23:06  Hct-27.3 / Hgb-8.7 / Plat-280 / RBC-3.11 / WBC-7.91  Serial CBC's  06-01 @ 16:18  Hct-28.3 / Hgb-8.9 / Plat-269 / RBC-3.16 / WBC-9.15  Serial CBC's  06-01 @ 08:22  Hct-30.4 / Hgb-9.8 / Plat-304 / RBC-3.44 / WBC-9.21      06-02    141  |  113<H>  |  12  ----------------------------<  127<H>  3.7   |  22  |  0.59    Ca    8.9      02 Jun 2021 06:23  Phos  2.7     06-02  Mg     2.0     06-02    TPro  5.8<L>  /  Alb  2.6<L>  /  TBili  0.4  /  DBili  x   /  AST  24  /  ALT  14  /  AlkPhos  109  06-01      PT/INR - ( 02 Jun 2021 06:23 )   PT: 12.9 sec;   INR: 1.09 ratio         PTT - ( 02 Jun 2021 06:23 )  PTT:28.8 sec    Iron - Total Binding Capacity.: 169 ug/dL (06-02 @ 06:23)              BLOOD SMEAR INTERPRETATION:       RADIOLOGY & ADDITIONAL STUDIES:    < from: CT Abdomen and Pelvis w/ IV Cont (06.01.21 @ 10:11) >  INTERPRETATION:  CLINICAL INFORMATION: Rectal bleeding    COMPARISON: None.    CONTRAST/COMPLICATIONS:  IV Contrast: Omnipaque 350  90 cc [id number] cc discarded  Oral Contrast: NONE  Complications: None reported at time of study completion    PROCEDURE:  CT of the Abdomen and Pelvis was performed.  Sagittal and coronal reformats were performed.    FINDINGS:  LOWER CHEST: Clear lung bases.    LIVER: Multiple subcentimeter hepatic hypodensities, too small to further characterize.  BILE DUCTS: Normal caliber.  GALLBLADDER: Cholelithiasis.  SPLEEN: Within normal limits.  PANCREAS: Mildly atrophic.  ADRENALS: Within normal limits.  KIDNEYS/URETERS: Subcentimeter renal hypodensities, too small to further characterize. No hydronephrosis.    BLADDER: Within normal limits.  REPRODUCTIVE ORGANS: Uterus and left adnexa within normal limits. 3.7 x 2.8 cm right adnexal mass.    BOWEL: No bowel obstruction. Appendix is normal. Colonic diverticulosis. Rectal wall thickening.  PERITONEUM: Trace pelvic fluid.  VESSELS: Atherosclerotic changes. Filling defects noted within the left common femoral, femoral, and deep femoral veins.  RETROPERITONEUM/LYMPH NODES: No lymphadenopathy.  ABDOMINAL WALL: Small fat-containing left inguinal hernia.  BONES: Degenerative changes. Severe compression deformity of L2. Fixation cement noted within the L2 vertebral body. Mild wedge deformity of T11.    IMPRESSION:    1. Rectal wall thickening, question proctitis.  2. Colonic diverticulosis without CT evidence of acute diverticulitis. No bowel obstruction.  3. Right adnexal mass (3.7 x 2.8 cm). Recommend pelvic ultrasound for further assessment.  4. Deep venous thrombosis within the imaged left lower extremity (left common femoral, femoral, and deep femoral veins).    Findings were discussed with Dr. Arellano by telephone on 6/1/2021 at 1035 hours.          < end of copied text >

## 2021-06-02 NOTE — PROGRESS NOTE ADULT - TIME BILLING
patient with acute lower gi bleeding , adnexal mass in acute hospital care   assessment and care plan as above   gi and hematology/oncology on case  care plan d/w np , hematology and patient's family

## 2021-06-03 LAB
ABO RH CONFIRMATION: SIGNIFICANT CHANGE UP
ALBUMIN SERPL ELPH-MCNC: 2 G/DL — LOW (ref 3.5–5)
ALP SERPL-CCNC: 77 U/L — SIGNIFICANT CHANGE UP (ref 40–120)
ALT FLD-CCNC: 9 U/L DA — LOW (ref 10–60)
ANION GAP SERPL CALC-SCNC: 7 MMOL/L — SIGNIFICANT CHANGE UP (ref 5–17)
AST SERPL-CCNC: 19 U/L — SIGNIFICANT CHANGE UP (ref 10–40)
BILIRUB SERPL-MCNC: 0.4 MG/DL — SIGNIFICANT CHANGE UP (ref 0.2–1.2)
BUN SERPL-MCNC: 9 MG/DL — SIGNIFICANT CHANGE UP (ref 7–18)
CALCIUM SERPL-MCNC: 8.9 MG/DL — SIGNIFICANT CHANGE UP (ref 8.4–10.5)
CHLORIDE SERPL-SCNC: 116 MMOL/L — HIGH (ref 96–108)
CO2 SERPL-SCNC: 22 MMOL/L — SIGNIFICANT CHANGE UP (ref 22–31)
CREAT SERPL-MCNC: 0.55 MG/DL — SIGNIFICANT CHANGE UP (ref 0.5–1.3)
GLUCOSE BLDC GLUCOMTR-MCNC: 159 MG/DL — HIGH (ref 70–99)
GLUCOSE BLDC GLUCOMTR-MCNC: 162 MG/DL — HIGH (ref 70–99)
GLUCOSE BLDC GLUCOMTR-MCNC: 177 MG/DL — HIGH (ref 70–99)
GLUCOSE BLDC GLUCOMTR-MCNC: 85 MG/DL — SIGNIFICANT CHANGE UP (ref 70–99)
GLUCOSE SERPL-MCNC: 121 MG/DL — HIGH (ref 70–99)
HCT VFR BLD CALC: 23.3 % — LOW (ref 34.5–45)
HGB BLD-MCNC: 7.4 G/DL — LOW (ref 11.5–15.5)
MCHC RBC-ENTMCNC: 28.2 PG — SIGNIFICANT CHANGE UP (ref 27–34)
MCHC RBC-ENTMCNC: 31.8 GM/DL — LOW (ref 32–36)
MCV RBC AUTO: 88.9 FL — SIGNIFICANT CHANGE UP (ref 80–100)
NRBC # BLD: 0 /100 WBCS — SIGNIFICANT CHANGE UP (ref 0–0)
OB PNL STL: NEGATIVE — SIGNIFICANT CHANGE UP
PLATELET # BLD AUTO: 228 K/UL — SIGNIFICANT CHANGE UP (ref 150–400)
POTASSIUM SERPL-MCNC: 3.5 MMOL/L — SIGNIFICANT CHANGE UP (ref 3.5–5.3)
POTASSIUM SERPL-SCNC: 3.5 MMOL/L — SIGNIFICANT CHANGE UP (ref 3.5–5.3)
PROT SERPL-MCNC: 4.7 G/DL — LOW (ref 6–8.3)
RBC # BLD: 2.62 M/UL — LOW (ref 3.8–5.2)
RBC # FLD: 15.6 % — HIGH (ref 10.3–14.5)
SODIUM SERPL-SCNC: 145 MMOL/L — SIGNIFICANT CHANGE UP (ref 135–145)
WBC # BLD: 5.11 K/UL — SIGNIFICANT CHANGE UP (ref 3.8–10.5)
WBC # FLD AUTO: 5.11 K/UL — SIGNIFICANT CHANGE UP (ref 3.8–10.5)

## 2021-06-03 PROCEDURE — 99223 1ST HOSP IP/OBS HIGH 75: CPT

## 2021-06-03 RX ORDER — SOD SULF/SODIUM/NAHCO3/KCL/PEG
2000 SOLUTION, RECONSTITUTED, ORAL ORAL ONCE
Refills: 0 | Status: COMPLETED | OUTPATIENT
Start: 2021-06-03 | End: 2021-06-03

## 2021-06-03 RX ORDER — MULTIVIT WITH MIN/MFOLATE/K2 340-15/3 G
1 POWDER (GRAM) ORAL EVERY 6 HOURS
Refills: 0 | Status: COMPLETED | OUTPATIENT
Start: 2021-06-03 | End: 2021-06-03

## 2021-06-03 RX ADMIN — CARBIDOPA, LEVODOPA, AND ENTACAPONE 1 TABLET(S): 50; 200; 200 TABLET, FILM COATED ORAL at 13:28

## 2021-06-03 RX ADMIN — Medication 20 MILLIGRAM(S): at 17:06

## 2021-06-03 RX ADMIN — SENNA PLUS 2 TABLET(S): 8.6 TABLET ORAL at 21:14

## 2021-06-03 RX ADMIN — RASAGILINE 1 MILLIGRAM(S): 0.5 TABLET ORAL at 11:45

## 2021-06-03 RX ADMIN — CARBIDOPA, LEVODOPA, AND ENTACAPONE 1 TABLET(S): 50; 200; 200 TABLET, FILM COATED ORAL at 21:14

## 2021-06-03 RX ADMIN — Medication 1: at 17:20

## 2021-06-03 RX ADMIN — CARBIDOPA, LEVODOPA, AND ENTACAPONE 1 TABLET(S): 50; 200; 200 TABLET, FILM COATED ORAL at 08:36

## 2021-06-03 RX ADMIN — Medication 2000 MILLILITER(S): at 14:56

## 2021-06-03 RX ADMIN — Medication 1 BOTTLE: at 11:01

## 2021-06-03 RX ADMIN — PANTOPRAZOLE SODIUM 40 MILLIGRAM(S): 20 TABLET, DELAYED RELEASE ORAL at 17:06

## 2021-06-03 RX ADMIN — Medication 1 BOTTLE: at 17:25

## 2021-06-03 RX ADMIN — Medication 20 MILLIGRAM(S): at 08:37

## 2021-06-03 RX ADMIN — PANTOPRAZOLE SODIUM 40 MILLIGRAM(S): 20 TABLET, DELAYED RELEASE ORAL at 08:36

## 2021-06-03 RX ADMIN — Medication 1: at 11:43

## 2021-06-03 RX ADMIN — Medication 1: at 08:37

## 2021-06-03 RX ADMIN — Medication 5 MILLIGRAM(S): at 11:44

## 2021-06-03 NOTE — PROGRESS NOTE ADULT - ASSESSMENT
75 year old female from [place], with PMH of Parkinson's, COPD, diverticulosis, HTN, DM and HLD, who presented to the ED due to rectal bleed. PT was admitted for medicine for suspected GI bleed. hg stable. CT abdomen showing rectal wall thickening, colonic diverticulosis without diverticulitis, right adnexal mass and DVT noted in LLE (left common femoral, femoral and deep femoral veins).  GI Dr. Galvez consulted and pt was recommended for colonoscopy however pt was unable to complete bowel prep and sigmoidoscopy was attempted on 6/3 after tap water  enema, with suspected blood and clots in rectum no official findings due to poor prep   NGT was attempted for bowel prep but pt agitated and kept pulling the NGT  On 6/3 hg trending down, pt for 1 PRBC, given signs of GIB, pt is not a candidate for AC, Vascular consulted for IVC filter   Pt more alert today, will attempt bowel prep again for possible colonoscopy tomorrow

## 2021-06-03 NOTE — PROGRESS NOTE ADULT - ASSESSMENT
75 year old female from [place], with PMH of Parkinson's, COPD, diverticulosis, HTN, DM and HLD, who presented to the ED due to rectal bleed. PT was admitted for medicine for suspected GI bleed. hg stable. CT abdomen showing rectal wall thickening, colonic diverticulosis without diverticulitis, right adnexal mass and DVT noted in LLE (left common femoral, femoral and deep femoral veins).  GI Dr. Galvez consulted and pt was recommended for colonoscopy however pt was unable to complete bowel prep, Plan is for pt to receive tap water enema and then have sigmoidoscopy today   AC was kept on hold due to suspected GIB

## 2021-06-03 NOTE — PROGRESS NOTE ADULT - ASSESSMENT
1. Rectal bleeding   1. R/o rectal ulcer  2. Proctitis  3. Colorectal neoplasm  4. Hemorrhoids    Suggestions:    1. Monitor H/H  2. Transfuse PRBC as needed  3. Avoid NSAID  4. Protonix 40mg daily  5. S/p Colonoscopy (poor prep)  6. DVT prophylaxis

## 2021-06-03 NOTE — PROGRESS NOTE ADULT - SUBJECTIVE AND OBJECTIVE BOX
no more bleeding  confused  no sob    MEDICATIONS  (STANDING):  carbidopa/levodopa/entacapone 12.5/50/200 1 Tablet(s) Oral three times a day  dextrose 5% + sodium chloride 0.9%. 1000 milliLiter(s) (75 mL/Hr) IV Continuous <Continuous>  enalapril 20 milliGRAM(s) Oral two times a day  insulin lispro (ADMELOG) corrective regimen sliding scale   SubCutaneous Before meals and at bedtime  oxybutynin XL 5 milliGRAM(s) Oral daily  pantoprazole  Injectable 40 milliGRAM(s) IV Push two times a day  polyethylene glycol/electrolyte Solution. 4000 milliLiter(s) Oral once  rasagiline Tablet 1 milliGRAM(s) Oral daily  senna 2 Tablet(s) Oral at bedtime    MEDICATIONS  (PRN):  acetaminophen   Tablet .. 650 milliGRAM(s) Oral every 6 hours PRN Temp greater or equal to 38C (100.4F), Moderate Pain (4 - 6)  ALBUTerol    90 MICROgram(s) HFA Inhaler 2 Puff(s) Inhalation every 6 hours PRN Shortness of Breath and/or Wheezing  ondansetron Injectable 4 milliGRAM(s) IV Push every 6 hours PRN Nausea and/or Vomiting  polyethylene glycol 3350 17 Gram(s) Oral daily PRN Constipation      Allergies    No Known Allergies    Intolerances        Vital Signs Last 24 Hrs  T(C): 37.1 (03 Jun 2021 05:01), Max: 37.1 (03 Jun 2021 05:01)  T(F): 98.8 (03 Jun 2021 05:01), Max: 98.8 (03 Jun 2021 05:01)  HR: 76 (03 Jun 2021 08:17) (64 - 79)  BP: 136/73 (03 Jun 2021 08:17) (106/65 - 148/78)  BP(mean): --  RR: 17 (03 Jun 2021 05:01) (16 - 18)  SpO2: 97% (03 Jun 2021 05:01) (97% - 100%)    PHYSICAL EXAM  General: adult in NAD  HEENT: clear oropharynx, anicteric sclera, pink conjunctiva  Neck: supple  CV: normal S1/S2 with no murmur rubs or gallops  Lungs: positive air movement b/l ant lungs,clear to auscultation, no wheezes, no rales  Abdomen: soft non-tender non-distended, no hepatosplenomegaly  Ext: no clubbing cyanosis or edema  Skin: no rashes and no petechiae  Neuro: alert and oriented X 4, no focal deficits  LABS:                          7.4    5.11  )-----------( 228      ( 03 Jun 2021 06:05 )             23.3         Mean Cell Volume : 88.9 fl  Mean Cell Hemoglobin : 28.2 pg  Mean Cell Hemoglobin Concentration : 31.8 gm/dL  Auto Neutrophil # : x  Auto Lymphocyte # : x  Auto Monocyte # : x  Auto Eosinophil # : x  Auto Basophil # : x  Auto Neutrophil % : x  Auto Lymphocyte % : x  Auto Monocyte % : x  Auto Eosinophil % : x  Auto Basophil % : x    Serial CBC  Hematocrit 23.3  Hemoglobin 7.4  Plat 228  RBC 2.62  WBC 5.11  Serial CBC  Hematocrit 24.9  Hemoglobin 8.0  Plat 256  RBC 2.84  WBC 6.27  Serial CBC  Hematocrit 27.3  Hemoglobin 8.7  Plat 280  RBC 3.11  WBC 7.91  Serial CBC  Hematocrit 28.3  Hemoglobin 8.9  Plat 269  RBC 3.16  WBC 9.15  Serial CBC  Hematocrit 30.4  Hemoglobin 9.8  Plat 304  RBC 3.44  WBC 9.21    06-03    145  |  116<H>  |  9   ----------------------------<  121<H>  3.5   |  22  |  0.55    Ca    8.9      03 Jun 2021 06:05  Phos  2.7     06-02  Mg     2.0     06-02    TPro  4.7<L>  /  Alb  2.0<L>  /  TBili  0.4  /  DBili  x   /  AST  19  /  ALT  9<L>  /  AlkPhos  77  06-03      PT/INR - ( 02 Jun 2021 06:23 )   PT: 12.9 sec;   INR: 1.09 ratio         PTT - ( 02 Jun 2021 06:23 )  PTT:28.8 sec    Ferritin, Serum: 451 ng/mL (06-02 @ 09:41)  Folate, Serum: 8.1 ng/mL (06-02 @ 09:41)  Vitamin B12, Serum: 445 pg/mL (06-02 @ 09:41)  Iron - Total Binding Capacity.: 169 ug/dL (06-02 @ 06:23)            BLOOD SMEAR INTERPRETATION:       RADIOLOGY & ADDITIONAL STUDIES:

## 2021-06-03 NOTE — PROGRESS NOTE ADULT - SUBJECTIVE AND OBJECTIVE BOX
[   ] ICU                                          [   ] CCU                                      [  X ] Medical Floor    Patient is a 75 year old with rectal bleeding. GI consulted to evaluate.      Patient is a 75 year old female from [place], with past medical history significant for Parkinson's, COPD, diverticulosis, HTN, DM and HLD, who presented to the ED due to rectal bleed. History is limited from patient due to her being a poor historian. As per ED provider note, patient was sent to the ED for concern for vaginal bleed. In ED, patient is noted to have rectal bleed instead of vaginal bleed. Patient is confused unable to provide any history. No abdominal pain, nausea, vomiting, hematemesis, melena, fever, chills, chest pain, SOB, cough, hematuria, dysuria or diarrhea reported.       Today patient appears comfortable with no new complaints. No abdominal pain, N/V, hematemesis, hematochezia, melena, fever, chills, chest pain, SOB, cough or diarrhea reported.    PAIN MANAGEMENT:  Pain Scale:                0/10  Pain Location:      Prior Colonoscopy: Unknown    PAST MEDICAL HISTORY  Parkinson disease  COPD  Diverticulosis  HTN (hypertension)  Diabetes mellitus  HLD (hyperlipidemia)      PAST SURGICAL HISTORY  No significant past surgical history      Allergies    No Known Allergies    Intolerances  None        SOCIAL HISTORY  Advanced Directives:       [ X ] Full Code       [  ] DNR  Marital Status:         [  ] M      [ X ] S      [  ] D       [  ] W  Children:       [ X ] Yes      [  ] No  Occupation:        [  ] Employed       [ X ] Unemployed       [  ] Retired  Diet:       [ X ] Regular       [  ] PEG feeding          [  ] NG tube feeding  Drug Use:           [ X ] Patient denied          [  ] Yes  Alcohol:           [ X ] No             [  ] Yes (socially)         [  ] Yes (chronic)  Tobacco:           [  ] Yes           [ X ] No    FAMILY HISTORY  [ X ] Heart Disease            [ X ] Diabetes             [ X ] HTN             [  ] Colon Cancer             [  ] Stomach Cancer              [  ] Pancreatic Cancer      VITALS  Vital Signs Last 24 Hrs  T(C): 36.7 (03 Jun 2021 15:57), Max: 37.1 (03 Jun 2021 05:01)  T(F): 98 (03 Jun 2021 15:57), Max: 98.8 (03 Jun 2021 05:01)  HR: 80 (03 Jun 2021 15:57) (64 - 80)  BP: 132/85 (03 Jun 2021 15:57) (106/65 - 148/78)   RR: 16 (03 Jun 2021 15:57) (16 - 18)  SpO2: 100% (03 Jun 2021 15:57) (97% - 100%)       MEDICATIONS  (STANDING):  carbidopa/levodopa/entacapone 12.5/50/200 1 Tablet(s) Oral three times a day  dextrose 5% + sodium chloride 0.9%. 1000 milliLiter(s) (75 mL/Hr) IV Continuous <Continuous>  enalapril 20 milliGRAM(s) Oral two times a day  insulin lispro (ADMELOG) corrective regimen sliding scale   SubCutaneous Before meals and at bedtime  magnesium citrate Oral Solution 1 Bottle Oral every 6 hours  oxybutynin XL 5 milliGRAM(s) Oral daily  pantoprazole  Injectable 40 milliGRAM(s) IV Push two times a day  rasagiline Tablet 1 milliGRAM(s) Oral daily  senna 2 Tablet(s) Oral at bedtime    MEDICATIONS  (PRN):  acetaminophen   Tablet .. 650 milliGRAM(s) Oral every 6 hours PRN Temp greater or equal to 38C (100.4F), Moderate Pain (4 - 6)  ALBUTerol    90 MICROgram(s) HFA Inhaler 2 Puff(s) Inhalation every 6 hours PRN Shortness of Breath and/or Wheezing  ondansetron Injectable 4 milliGRAM(s) IV Push every 6 hours PRN Nausea and/or Vomiting  polyethylene glycol 3350 17 Gram(s) Oral daily PRN Constipation                            7.4    5.11  )-----------( 228      ( 03 Jun 2021 06:05 )             23.3       06-03    145  |  116<H>  |  9   ----------------------------<  121<H>  3.5   |  22  |  0.55    Ca    8.9      03 Jun 2021 06:05  Phos  2.7     06-02  Mg     2.0     06-02    TPro  4.7<L>  /  Alb  2.0<L>  /  TBili  0.4  /  DBili  x   /  AST  19  /  ALT  9<L>  /  AlkPhos  77  06-03      PT/INR - ( 02 Jun 2021 06:23 )   PT: 12.9 sec;   INR: 1.09 ratio         PTT - ( 02 Jun 2021 06:23 )  PTT:28.8 sec

## 2021-06-03 NOTE — PROGRESS NOTE ADULT - TIME BILLING
patient with acute lower gi bleeding , adnexal mass in acute hospital care   assessment and care plan as above   gi and hematology/oncology on case  vascular eval  overall prognosis poor  care plan d/w np , hematology and patient's family

## 2021-06-03 NOTE — PROGRESS NOTE ADULT - PROBLEM SELECTOR PLAN 1
-sent from NH for rectal bleeding   -pt was unable to complete bowel prep and sigmoidoscopy was attempted on 6/3 after tap water  enema, with suspected blood and clots in rectum but no official findings due to poor prep. NGT was attempted for bowel prep but pt agitated and kept pulling the NGT  -hg trending down  -transfuse 1 PRBC   -Pt more alert today, will attempt bowel prep again for possible colonoscopy tomorrow   -cont PPI   -mon CBC   -Transfuse PRN if Hg <7, consent in chart   -GI Dr. Galvez

## 2021-06-03 NOTE — CHART NOTE - NSCHARTNOTEFT_GEN_A_CORE
-Pt's requirement for IVC filter was discussed with IR and Vascular in AM, case was accepted by IR   -Call received from IR this afternoon that based on imaging pt is not a candidate for IVF filter with IR, vascular consult recalled  -pt completed 1 bottle of mag citrate and half a bottle of Moviprep with no BM so far, Magnesium citrate x1 this evening and tap water enema in AM     Above d/w Dr. Galvez and Dr. Yan

## 2021-06-03 NOTE — PROGRESS NOTE ADULT - ASSESSMENT
· Assessment	  75 year old lady presented with rectal bleeding.  CT showed rectal thickening, adnexal mass, and DVT.    1. rectal bleeding. will do sigmoidoscopy  please give her enema  discussed with Dr. Galvez  poor prep  large hemorrhoid, ?mass  need good prep but she pulled NGT    2. adnexal mass  will do transvaginal ultrasound  check ca 125    3. DVT  not on AC yet due to rectal bleeding  no bleeding today so far  for sigmoidoscopy  if there is a significant lesion in rectum, she will need IVC filter.  otherwise, she can start heparin if bleeding can be stopped in sigmoidoscopy.  no more bleeding for 2 days  will start heparin keep PTT around 60

## 2021-06-03 NOTE — CONSULT NOTE ADULT - SUBJECTIVE AND OBJECTIVE BOX
Attending:  Dr Cedeno     HPI:  75 year old female from [place], with PMH of Parkinson's, COPD, diverticulosis, HTN, DM and HLD, admitted to medical services w/ rectal bleed. Vascular surgery called for IVC filter placement. Pt seen and examined at bedside, poor historian. Denies abd pain, no n/v, no LE/ calf pain.   Pt w/ b/l LE DVT, unable to be anticoagulated due to GI bleed.   Pt scheduled for colonoscopy w/ GI Dr Galvez tomorrow.     PAST MEDICAL & SURGICAL HISTORY:  Parkinson disease    History of COPD    Diverticulosis    HTN (hypertension)    Diabetes mellitus    HLD (hyperlipidemia)    No significant past surgical history        MEDICATIONS  (STANDING):  carbidopa/levodopa/entacapone 12.5/50/200 1 Tablet(s) Oral three times a day  dextrose 5% + sodium chloride 0.9%. 1000 milliLiter(s) (75 mL/Hr) IV Continuous <Continuous>  enalapril 20 milliGRAM(s) Oral two times a day  insulin lispro (ADMELOG) corrective regimen sliding scale   SubCutaneous Before meals and at bedtime  oxybutynin XL 5 milliGRAM(s) Oral daily  pantoprazole  Injectable 40 milliGRAM(s) IV Push two times a day  rasagiline Tablet 1 milliGRAM(s) Oral daily  senna 2 Tablet(s) Oral at bedtime    MEDICATIONS  (PRN):  acetaminophen   Tablet .. 650 milliGRAM(s) Oral every 6 hours PRN Temp greater or equal to 38C (100.4F), Moderate Pain (4 - 6)  ALBUTerol    90 MICROgram(s) HFA Inhaler 2 Puff(s) Inhalation every 6 hours PRN Shortness of Breath and/or Wheezing  ondansetron Injectable 4 milliGRAM(s) IV Push every 6 hours PRN Nausea and/or Vomiting  polyethylene glycol 3350 17 Gram(s) Oral daily PRN Constipation      Allergies    No Known Allergies    Intolerances        SOCIAL HISTORY:  Unknown   FAMILY HISTORY:  Unknown     Vital Signs Last 24 Hrs  T(C): 36.7 (03 Jun 2021 15:57), Max: 37.1 (03 Jun 2021 05:01)  T(F): 98 (03 Jun 2021 15:57), Max: 98.8 (03 Jun 2021 05:01)  HR: 80 (03 Jun 2021 15:57) (64 - 80)  BP: 132/85 (03 Jun 2021 15:57) (106/65 - 138/79)  BP(mean): --  RR: 16 (03 Jun 2021 15:57) (16 - 18)  SpO2: 100% (03 Jun 2021 15:57) (97% - 100%)    I&O's Summary      LABS:                        7.4    5.11  )-----------( 228      ( 03 Jun 2021 06:05 )             23.3     06-03    145  |  116<H>  |  9   ----------------------------<  121<H>  3.5   |  22  |  0.55    Ca    8.9      03 Jun 2021 06:05  Phos  2.7     06-02  Mg     2.0     06-02    TPro  4.7<L>  /  Alb  2.0<L>  /  TBili  0.4  /  DBili  x   /  AST  19  /  ALT  9<L>  /  AlkPhos  77  06-03    PT/INR - ( 02 Jun 2021 06:23 )   PT: 12.9 sec;   INR: 1.09 ratio         PTT - ( 02 Jun 2021 06:23 )  PTT:28.8 sec    CAPILLARY BLOOD GLUCOSE      POCT Blood Glucose.: 162 mg/dL (03 Jun 2021 17:08)  POCT Blood Glucose.: 159 mg/dL (03 Jun 2021 11:39)  POCT Blood Glucose.: 177 mg/dL (03 Jun 2021 07:52)  POCT Blood Glucose.: 115 mg/dL (02 Jun 2021 20:48)    LIVER FUNCTIONS - ( 03 Jun 2021 06:05 )  Alb: 2.0 g/dL / Pro: 4.7 g/dL / ALK PHOS: 77 U/L / ALT: 9 U/L DA / AST: 19 U/L / GGT: x             RADIOLOGY & ADDITIONAL STUDIES:  < from: CT Abdomen and Pelvis w/ IV Cont (06.01.21 @ 10:11) >  FINDINGS:  LOWER CHEST: Clear lung bases.    LIVER: Multiple subcentimeter hepatic hypodensities, too small to further characterize.  BILE DUCTS: Normal caliber.  GALLBLADDER: Cholelithiasis.  SPLEEN: Within normal limits.  PANCREAS: Mildly atrophic.  ADRENALS: Within normal limits.  KIDNEYS/URETERS: Subcentimeter renal hypodensities, too small to further characterize. No hydronephrosis.    BLADDER: Within normal limits.  REPRODUCTIVE ORGANS: Uterus and left adnexa within normal limits. 3.7 x 2.8 cm right adnexal mass.    BOWEL: No bowel obstruction. Appendix is normal. Colonic diverticulosis. Rectal wall thickening.  PERITONEUM: Trace pelvic fluid.  VESSELS: Atherosclerotic changes. Filling defects noted within the left common femoral, femoral, and deep femoral veins.  RETROPERITONEUM/LYMPH NODES: No lymphadenopathy.  ABDOMINAL WALL: Small fat-containing left inguinal hernia.  BONES: Degenerative changes. Severe compression deformity of L2. Fixation cement noted within the L2 vertebral body. Mild wedge deformity of T11.    IMPRESSION:    1. Rectal wall thickening, question proctitis.  2. Colonic diverticulosis without CT evidence of acute diverticulitis. No bowel obstruction.  3. Right adnexal mass (3.7 x 2.8 cm). Recommend pelvic ultrasound for further assessment.  4. Deep venous thrombosis within the imaged left lower extremity (left common femoral, femoral, and deep femoral veins).    < end of copied text >      < from: US Duplex Venous Lower Ext Complete, Bilateral (06.01.21 @ 14:06) >  FINDINGS:    RIGHT:  Normal compressibility of the RIGHT common femoral vein. Noncompressible thrombi are noted in the peroneal vein, popliteal vein and lower femoralvein, compatible with deep vein thrombosis.    LEFT: The patient refused examination of the left leg.    IMPRESSION:  Deep vein thrombosis in the right leg as above.  Dr. Arellano is aware.    The patient refused examination of the left leg.    < end of copied text >

## 2021-06-03 NOTE — CONSULT NOTE ADULT - GASTROINTESTINAL DETAILS
soft/nontender/no distention/no masses palpable/bowel sounds normal/no bruit/no rebound tenderness/no guarding/no rigidity
soft/nontender/no distention/no rebound tenderness/no guarding/no rigidity

## 2021-06-03 NOTE — CONSULT NOTE ADULT - ASSESSMENT
74 y/o Female w/ b/l LE DVT, GI bleed    -Pt w/ b/l LE DVT, unable to be anticoagulated due to GI bleed   -IR unable to place IVC filter   -Imaging studies to be further reviewed by Dr Cedeno and d/w IR, possible IVC filter placement w/ IR vs Vascular   -GI f/u / colonoscopy 6/4/21

## 2021-06-03 NOTE — PROGRESS NOTE ADULT - SUBJECTIVE AND OBJECTIVE BOX
Patient is a 75y old  Female who presents with a chief complaint of rectal bleed (02 Jun 2021 10:26)    OVERNIGHT EVENTS: pt refused bowel prep overnight, no melena overnight  , sigmoidoscopy was incomplete  MEDICATIONS  (STANDING):  carbidopa/levodopa/entacapone 12.5/50/200 1 Tablet(s) Oral three times a day  dextrose 5% + sodium chloride 0.9%. 1000 milliLiter(s) (75 mL/Hr) IV Continuous <Continuous>  enalapril 20 milliGRAM(s) Oral two times a day  insulin lispro (ADMELOG) corrective regimen sliding scale   SubCutaneous Before meals and at bedtime  magnesium citrate Oral Solution 1 Bottle Oral every 6 hours  oxybutynin XL 5 milliGRAM(s) Oral daily  pantoprazole  Injectable 40 milliGRAM(s) IV Push two times a day  polyethylene glycol/electrolyte Solution. 4000 milliLiter(s) Oral once  rasagiline Tablet 1 milliGRAM(s) Oral daily  senna 2 Tablet(s) Oral at bedtime    MEDICATIONS  (PRN):  acetaminophen   Tablet .. 650 milliGRAM(s) Oral every 6 hours PRN Temp greater or equal to 38C (100.4F), Moderate Pain (4 - 6)  ALBUTerol    90 MICROgram(s) HFA Inhaler 2 Puff(s) Inhalation every 6 hours PRN Shortness of Breath and/or Wheezing  ondansetron Injectable 4 milliGRAM(s) IV Push every 6 hours PRN Nausea and/or Vomiting  polyethylene glycol 3350 17 Gram(s) Oral daily PRN Constipation        REVIEW OF SYSTEMS: RAHUL due to mental status     Vital Signs Last 24 Hrs  T(C): 37.1 (03 Jun 2021 05:01), Max: 37.1 (03 Jun 2021 05:01)  T(F): 98.8 (03 Jun 2021 05:01), Max: 98.8 (03 Jun 2021 05:01)  HR: 76 (03 Jun 2021 08:17) (64 - 79)  BP: 136/73 (03 Jun 2021 08:17) (106/65 - 148/78)  BP(mean): --  RR: 17 (03 Jun 2021 05:01) (16 - 18)  SpO2: 97% (03 Jun 2021 05:01) (97% - 100%)      PHYSICAL EXAM:  GENERAL: frail, b/l temporal wasting, lethargic   EYES: clear conjunctiva  ENMT: Moist mucous membranes  NECK: Supple, No JVD  CHEST/LUNG: Clear to diminished bilaterally; No rales, rhonchi, wheezing, or rubs  HEART: S1, S2, Regular rate and rhythm  ABDOMEN: Soft, Nontender, Nondistended; Bowel sounds present  NEURO: Alert & Oriented to person only, lethargic   EXTREMITIES: +2 LE edema L>R   SKIN: No rashes or lesions    LABS:                        7.4    5.11  )-----------( 228      ( 03 Jun 2021 06:05 )             23.3     06-03    145  |  116<H>  |  9   ----------------------------<  121<H>  3.5   |  22  |  0.55    Ca    8.9      03 Jun 2021 06:05  Phos  2.7     06-02  Mg     2.0     06-02    TPro  4.7<L>  /  Alb  2.0<L>  /  TBili  0.4  /  DBili  x   /  AST  19  /  ALT  9<L>  /  AlkPhos  77  06-03    PT/INR - ( 02 Jun 2021 06:23 )   PT: 12.9 sec;   INR: 1.09 ratio         PTT - ( 02 Jun 2021 06:23 )  PTT:28.8 sec                      LABS:                        8.0    6.27  )-----------( 256      ( 02 Jun 2021 06:24 )             24.9     06-02    141  |  113<H>  |  12  ----------------------------<  127<H>  3.7   |  22  |  0.59    Ca    8.9      02 Jun 2021 06:23  Phos  2.7     06-02  Mg     2.0     06-02    TPro  5.8<L>  /  Alb  2.6<L>  /  TBili  0.4  /  DBili  x   /  AST  24  /  ALT  14  /  AlkPhos  109  06-01    PT/INR - ( 02 Jun 2021 06:23 )   PT: 12.9 sec;   INR: 1.09 ratio         PTT - ( 02 Jun 2021 06:23 )  PTT:28.8 sec  CAPILLARY BLOOD GLUCOSE    POCT Blood Glucose.: 166 mg/dL (02 Jun 2021 08:07)  POCT Blood Glucose.: 158 mg/dL (02 Jun 2021 05:38)  POCT Blood Glucose.: 91 mg/dL (01 Jun 2021 21:40)  POCT Blood Glucose.: 71 mg/dL (01 Jun 2021 17:43)  POCT Blood Glucose.: 96 mg/dL (01 Jun 2021 13:00)  POCT Blood Glucose.: 57 mg/dL (01 Jun 2021 12:22)  POCT Blood Glucose.: 39 mg/dL (01 Jun 2021 12:19)    RADIOLOGY & ADDITIONAL TESTS:    < from: CT Abdomen and Pelvis w/ IV Cont (06.01.21 @ 10:11) >    EXAM:  CT ABDOMEN AND PELVIS IC                            PROCEDURE DATE:  06/01/2021          INTERPRETATION:  CLINICAL INFORMATION: Rectal bleeding    COMPARISON: None.    CONTRAST/COMPLICATIONS:  IV Contrast: Omnipaque 350  90 cc [id number] cc discarded  Oral Contrast: NONE  Complications: None reported at time of study completion    PROCEDURE:  CT of the Abdomen and Pelvis was performed.  Sagittal and coronal reformats were performed.    FINDINGS:  LOWER CHEST: Clear lung bases.    LIVER: Multiple subcentimeter hepatic hypodensities, too small to further characterize.  BILE DUCTS: Normal caliber.  GALLBLADDER: Cholelithiasis.  SPLEEN: Within normal limits.  PANCREAS: Mildly atrophic.  ADRENALS: Within normal limits.  KIDNEYS/URETERS: Subcentimeter renal hypodensities, too small to further characterize. No hydronephrosis.    BLADDER: Within normal limits.  REPRODUCTIVE ORGANS: Uterus and left adnexa within normal limits. 3.7 x 2.8 cm right adnexal mass.    BOWEL: No bowel obstruction. Appendix is normal. Colonic diverticulosis. Rectal wall thickening.  PERITONEUM: Trace pelvic fluid.  VESSELS: Atherosclerotic changes. Filling defects noted within the left common femoral, femoral, and deep femoral veins.  RETROPERITONEUM/LYMPH NODES: No lymphadenopathy.  ABDOMINAL WALL: Small fat-containing left inguinal hernia.  BONES: Degenerative changes. Severe compression deformity of L2. Fixation cement noted within the L2 vertebral body. Mild wedge deformity of T11.    IMPRESSION:    1. Rectal wall thickening, question proctitis.  2. Colonic diverticulosis without CT evidence of acute diverticulitis. No bowel obstruction.  3. Right adnexal mass (3.7 x 2.8 cm). Recommend pelvic ultrasound for further assessment.  4. Deep venous thrombosis within the imaged left lower extremity (left common femoral, femoral, and deep femoral veins).    Findings were discussed with Dr. Arellano by telephone on 6/1/2021 at 1035 hours.        < end of copied text >    < from: US Duplex Venous Lower Ext Complete, Bilateral (06.01.21 @ 14:06) >    EXAM:  US DPLX LWR EXT VEINS COMPL BI                            PROCEDURE DATE:  06/01/2021          INTERPRETATION:  CLINICAL INFORMATION: History of knee surgery    COMPARISON: None available.    TECHNIQUE: Duplex sonography of the BILATERAL LOWER extremity veins with color and spectral Doppler, with and without compression.    FINDINGS:    RIGHT:  Normal compressibility of the RIGHT common femoral vein. Noncompressible thrombi are noted in the peroneal vein, popliteal vein and lower femoralvein, compatible with deep vein thrombosis.    LEFT: The patient refused examination of the left leg.    IMPRESSION:  Deep vein thrombosis in the right leg as above.  Dr. Arellano is aware.    The patient refused examination of the left leg.      < end of copied text >      Imaging Personally Reviewed:  [ ] YES  [ ] NO

## 2021-06-03 NOTE — CONSULT NOTE ADULT - RS GEN PE MLT RESP DETAILS PC
respirations non-labored
airway patent/breath sounds equal/good air movement/respirations non-labored/no rales/no rhonchi

## 2021-06-03 NOTE — PROGRESS NOTE ADULT - SUBJECTIVE AND OBJECTIVE BOX
Patient is a 75y old  Female who presents with a chief complaint of rectal bleed (03 Jun 2021 10:52)    OVERNIGHT EVENTS: refused NGT yesterday for bowel prep     REVIEW OF SYSTEMS: limited due to confusion   RESPIRATORY: No cough, SOB  CARDIOVASCULAR: No chest pain, palpitations  GASTROINTESTINAL: No abdominal pain.   NEUROLOGICAL: No HA      T(C): 37.1 (06-03-21 @ 05:01), Max: 37.1 (06-03-21 @ 05:01)  HR: 76 (06-03-21 @ 08:17) (64 - 79)  BP: 136/73 (06-03-21 @ 08:17) (106/65 - 148/78)  RR: 17 (06-03-21 @ 05:01) (16 - 18)  SpO2: 97% (06-03-21 @ 05:01) (97% - 100%)  Wt(kg): --Vital Signs Last 24 Hrs  T(C): 37.1 (03 Jun 2021 05:01), Max: 37.1 (03 Jun 2021 05:01)  T(F): 98.8 (03 Jun 2021 05:01), Max: 98.8 (03 Jun 2021 05:01)  HR: 76 (03 Jun 2021 08:17) (64 - 79)  BP: 136/73 (03 Jun 2021 08:17) (106/65 - 148/78)  BP(mean): --  RR: 17 (03 Jun 2021 05:01) (16 - 18)  SpO2: 97% (03 Jun 2021 05:01) (97% - 100%)    MEDICATIONS  (STANDING):  carbidopa/levodopa/entacapone 12.5/50/200 1 Tablet(s) Oral three times a day  dextrose 5% + sodium chloride 0.9%. 1000 milliLiter(s) (75 mL/Hr) IV Continuous <Continuous>  enalapril 20 milliGRAM(s) Oral two times a day  insulin lispro (ADMELOG) corrective regimen sliding scale   SubCutaneous Before meals and at bedtime  magnesium citrate Oral Solution 1 Bottle Oral every 6 hours  oxybutynin XL 5 milliGRAM(s) Oral daily  pantoprazole  Injectable 40 milliGRAM(s) IV Push two times a day  polyethylene glycol/electrolyte Solution 2000 milliLiter(s) Oral once  polyethylene glycol/electrolyte Solution. 4000 milliLiter(s) Oral once  rasagiline Tablet 1 milliGRAM(s) Oral daily  senna 2 Tablet(s) Oral at bedtime    MEDICATIONS  (PRN):  acetaminophen   Tablet .. 650 milliGRAM(s) Oral every 6 hours PRN Temp greater or equal to 38C (100.4F), Moderate Pain (4 - 6)  ALBUTerol    90 MICROgram(s) HFA Inhaler 2 Puff(s) Inhalation every 6 hours PRN Shortness of Breath and/or Wheezing  ondansetron Injectable 4 milliGRAM(s) IV Push every 6 hours PRN Nausea and/or Vomiting  polyethylene glycol 3350 17 Gram(s) Oral daily PRN Constipation      PHYSICAL EXAM:  GENERAL: frail, b/l temporal wasting, lethargic   EYES: clear conjunctiva  ENMT: Moist mucous membranes  NECK: Supple, No JVD  CHEST/LUNG: Clear to diminished bilaterally; No rales, rhonchi, wheezing, or rubs  HEART: S1, S2, Regular rate and rhythm  ABDOMEN: Soft, Nontender, Nondistended; Bowel sounds present  NEURO: Alert & Oriented to person only, lethargic   EXTREMITIES: +2 LE edema L>R   SKIN: No rashes or lesions    Consultant(s) Notes Reviewed:  [x ] YES  [ ] NO  Care Discussed with Consultants/Other Providers [ x] YES  [ ] NO    LABS:                        7.4    5.11  )-----------( 228      ( 03 Jun 2021 06:05 )             23.3     06-03    145  |  116<H>  |  9   ----------------------------<  121<H>  3.5   |  22  |  0.55    Ca    8.9      03 Jun 2021 06:05  Phos  2.7     06-02  Mg     2.0     06-02    TPro  4.7<L>  /  Alb  2.0<L>  /  TBili  0.4  /  DBili  x   /  AST  19  /  ALT  9<L>  /  AlkPhos  77  06-03    PT/INR - ( 02 Jun 2021 06:23 )   PT: 12.9 sec;   INR: 1.09 ratio         PTT - ( 02 Jun 2021 06:23 )  PTT:28.8 sec  CAPILLARY BLOOD GLUCOSE    POCT Blood Glucose.: 159 mg/dL (03 Jun 2021 11:39)  POCT Blood Glucose.: 177 mg/dL (03 Jun 2021 07:52)  POCT Blood Glucose.: 115 mg/dL (02 Jun 2021 20:48)  POCT Blood Glucose.: 105 mg/dL (02 Jun 2021 16:48)    RADIOLOGY & ADDITIONAL TESTS:    < from: CT Abdomen and Pelvis w/ IV Cont (06.01.21 @ 10:11) >    EXAM:  CT ABDOMEN AND PELVIS IC                            PROCEDURE DATE:  06/01/2021          INTERPRETATION:  CLINICAL INFORMATION: Rectal bleeding    COMPARISON: None.    CONTRAST/COMPLICATIONS:  IV Contrast: Omnipaque 350  90 cc [id number] cc discarded  Oral Contrast: NONE  Complications: None reported at time of study completion    PROCEDURE:  CT of the Abdomen and Pelvis was performed.  Sagittal and coronal reformats were performed.    FINDINGS:  LOWER CHEST: Clear lung bases.    LIVER: Multiple subcentimeter hepatic hypodensities, too small to further characterize.  BILE DUCTS: Normal caliber.  GALLBLADDER: Cholelithiasis.  SPLEEN: Within normal limits.  PANCREAS: Mildly atrophic.  ADRENALS: Within normal limits.  KIDNEYS/URETERS: Subcentimeter renal hypodensities, too small to further characterize. No hydronephrosis.    BLADDER: Within normal limits.  REPRODUCTIVE ORGANS: Uterus and left adnexa within normal limits. 3.7 x 2.8 cm right adnexal mass.    BOWEL: No bowel obstruction. Appendix is normal. Colonic diverticulosis. Rectal wall thickening.  PERITONEUM: Trace pelvic fluid.  VESSELS: Atherosclerotic changes. Filling defects noted within the left common femoral, femoral, and deep femoral veins.  RETROPERITONEUM/LYMPH NODES: No lymphadenopathy.  ABDOMINAL WALL: Small fat-containing left inguinal hernia.  BONES: Degenerative changes. Severe compression deformity of L2. Fixation cement noted within the L2 vertebral body. Mild wedge deformity of T11.    IMPRESSION:    1. Rectal wall thickening, question proctitis.  2. Colonic diverticulosis without CT evidence of acute diverticulitis. No bowel obstruction.  3. Right adnexal mass (3.7 x 2.8 cm). Recommend pelvic ultrasound for further assessment.  4. Deep venous thrombosis within the imaged left lower extremity (left common femoral, femoral, and deep femoral veins).    Findings were discussed with Dr. Arellano by telephone on 6/1/2021 at 1035 hours.              < end of copied text >      Imaging Personally Reviewed:  [ ] YES  [ ] NO

## 2021-06-04 LAB
ALBUMIN SERPL ELPH-MCNC: 2.2 G/DL — LOW (ref 3.5–5)
ALP SERPL-CCNC: 86 U/L — SIGNIFICANT CHANGE UP (ref 40–120)
ALT FLD-CCNC: 9 U/L DA — LOW (ref 10–60)
ANION GAP SERPL CALC-SCNC: 6 MMOL/L — SIGNIFICANT CHANGE UP (ref 5–17)
APTT BLD: 25.8 SEC — LOW (ref 27.5–35.5)
AST SERPL-CCNC: 22 U/L — SIGNIFICANT CHANGE UP (ref 10–40)
BILIRUB SERPL-MCNC: 0.5 MG/DL — SIGNIFICANT CHANGE UP (ref 0.2–1.2)
BUN SERPL-MCNC: 9 MG/DL — SIGNIFICANT CHANGE UP (ref 7–18)
CALCIUM SERPL-MCNC: 9.1 MG/DL — SIGNIFICANT CHANGE UP (ref 8.4–10.5)
CHLORIDE SERPL-SCNC: 116 MMOL/L — HIGH (ref 96–108)
CO2 SERPL-SCNC: 25 MMOL/L — SIGNIFICANT CHANGE UP (ref 22–31)
CREAT SERPL-MCNC: 0.59 MG/DL — SIGNIFICANT CHANGE UP (ref 0.5–1.3)
GLUCOSE BLDC GLUCOMTR-MCNC: 102 MG/DL — HIGH (ref 70–99)
GLUCOSE BLDC GLUCOMTR-MCNC: 115 MG/DL — HIGH (ref 70–99)
GLUCOSE BLDC GLUCOMTR-MCNC: 145 MG/DL — HIGH (ref 70–99)
GLUCOSE BLDC GLUCOMTR-MCNC: 204 MG/DL — HIGH (ref 70–99)
GLUCOSE SERPL-MCNC: 97 MG/DL — SIGNIFICANT CHANGE UP (ref 70–99)
HCT VFR BLD CALC: 27.4 % — LOW (ref 34.5–45)
HCT VFR BLD CALC: 28.8 % — LOW (ref 34.5–45)
HGB BLD-MCNC: 8.6 G/DL — LOW (ref 11.5–15.5)
HGB BLD-MCNC: 9.2 G/DL — LOW (ref 11.5–15.5)
INR BLD: 1.08 RATIO — SIGNIFICANT CHANGE UP (ref 0.88–1.16)
MCHC RBC-ENTMCNC: 27.4 PG — SIGNIFICANT CHANGE UP (ref 27–34)
MCHC RBC-ENTMCNC: 27.8 PG — SIGNIFICANT CHANGE UP (ref 27–34)
MCHC RBC-ENTMCNC: 31.4 GM/DL — LOW (ref 32–36)
MCHC RBC-ENTMCNC: 31.9 GM/DL — LOW (ref 32–36)
MCV RBC AUTO: 87 FL — SIGNIFICANT CHANGE UP (ref 80–100)
MCV RBC AUTO: 87.3 FL — SIGNIFICANT CHANGE UP (ref 80–100)
NRBC # BLD: 0 /100 WBCS — SIGNIFICANT CHANGE UP (ref 0–0)
NRBC # BLD: 0 /100 WBCS — SIGNIFICANT CHANGE UP (ref 0–0)
PLATELET # BLD AUTO: 232 K/UL — SIGNIFICANT CHANGE UP (ref 150–400)
PLATELET # BLD AUTO: 238 K/UL — SIGNIFICANT CHANGE UP (ref 150–400)
POTASSIUM SERPL-MCNC: 3.5 MMOL/L — SIGNIFICANT CHANGE UP (ref 3.5–5.3)
POTASSIUM SERPL-SCNC: 3.5 MMOL/L — SIGNIFICANT CHANGE UP (ref 3.5–5.3)
PROT SERPL-MCNC: 5 G/DL — LOW (ref 6–8.3)
PROTHROM AB SERPL-ACNC: 12.8 SEC — SIGNIFICANT CHANGE UP (ref 10.6–13.6)
RBC # BLD: 3.14 M/UL — LOW (ref 3.8–5.2)
RBC # BLD: 3.31 M/UL — LOW (ref 3.8–5.2)
RBC # FLD: 17.1 % — HIGH (ref 10.3–14.5)
RBC # FLD: 17.2 % — HIGH (ref 10.3–14.5)
SODIUM SERPL-SCNC: 147 MMOL/L — HIGH (ref 135–145)
WBC # BLD: 6.37 K/UL — SIGNIFICANT CHANGE UP (ref 3.8–10.5)
WBC # BLD: 6.57 K/UL — SIGNIFICANT CHANGE UP (ref 3.8–10.5)
WBC # FLD AUTO: 6.37 K/UL — SIGNIFICANT CHANGE UP (ref 3.8–10.5)
WBC # FLD AUTO: 6.57 K/UL — SIGNIFICANT CHANGE UP (ref 3.8–10.5)

## 2021-06-04 RX ORDER — SODIUM CHLORIDE 9 MG/ML
1000 INJECTION, SOLUTION INTRAVENOUS
Refills: 0 | Status: DISCONTINUED | OUTPATIENT
Start: 2021-06-04 | End: 2021-06-09

## 2021-06-04 RX ADMIN — Medication 2: at 21:18

## 2021-06-04 RX ADMIN — CARBIDOPA, LEVODOPA, AND ENTACAPONE 1 TABLET(S): 50; 200; 200 TABLET, FILM COATED ORAL at 05:26

## 2021-06-04 RX ADMIN — SENNA PLUS 2 TABLET(S): 8.6 TABLET ORAL at 21:17

## 2021-06-04 RX ADMIN — CARBIDOPA, LEVODOPA, AND ENTACAPONE 1 TABLET(S): 50; 200; 200 TABLET, FILM COATED ORAL at 21:17

## 2021-06-04 RX ADMIN — PANTOPRAZOLE SODIUM 40 MILLIGRAM(S): 20 TABLET, DELAYED RELEASE ORAL at 17:54

## 2021-06-04 RX ADMIN — SODIUM CHLORIDE 60 MILLILITER(S): 9 INJECTION, SOLUTION INTRAVENOUS at 11:51

## 2021-06-04 RX ADMIN — Medication 20 MILLIGRAM(S): at 05:26

## 2021-06-04 RX ADMIN — PANTOPRAZOLE SODIUM 40 MILLIGRAM(S): 20 TABLET, DELAYED RELEASE ORAL at 05:26

## 2021-06-04 NOTE — PROGRESS NOTE ADULT - PROBLEM SELECTOR PLAN 1
-sent from NH for rectal bleeding   - colonoscopy was done on 6/2 however it was incomplete due to poor bowel prep (findings: large internal hemorrhoids and large amount of stool and blood clots). NGT was attempted for bowel prep but pt agitated and kept pulling the NGT. Pt received water enema prior to scheduled colonoscopy this morning however she started having bright red stool with some clots. Repeat CBC done per Dr Galvez (H&H 9.2/28.8 -> 8.6/27.4).   -Pt received transfuse 1 PRBC yesterday (6/3). Colonoscopy done today   -cont PPI  -monitor CBC   -Transfuse PRN if Hg <7, consent in chart   -GI Dr. Galvez

## 2021-06-04 NOTE — CHART NOTE - NSCHARTNOTEFT_GEN_A_CORE
Pt s/p colonoscopy. Pending GI recommendation but advanced diet per GI Dr Galvez. Will resume clear liquid diet.

## 2021-06-04 NOTE — PROGRESS NOTE ADULT - SUBJECTIVE AND OBJECTIVE BOX
Patient is a 75y old  Female who presents with a chief complaint of rectal bleed       INTERVAL HPI/OVERNIGHT EVENTS: Pt received water enema prior to scheduled colonoscopy this morning however she started having bright red stool with some clots.       MEDICATIONS  (STANDING):  carbidopa/levodopa/entacapone 12.5/50/200 1 Tablet(s) Oral three times a day  dextrose 5%. 1000 milliLiter(s) (60 mL/Hr) IV Continuous <Continuous>  enalapril 20 milliGRAM(s) Oral two times a day  insulin lispro (ADMELOG) corrective regimen sliding scale   SubCutaneous Before meals and at bedtime  oxybutynin XL 5 milliGRAM(s) Oral daily  pantoprazole  Injectable 40 milliGRAM(s) IV Push two times a day  rasagiline Tablet 1 milliGRAM(s) Oral daily  senna 2 Tablet(s) Oral at bedtime    MEDICATIONS  (PRN):  acetaminophen   Tablet .. 650 milliGRAM(s) Oral every 6 hours PRN Temp greater or equal to 38C (100.4F), Moderate Pain (4 - 6)  ALBUTerol    90 MICROgram(s) HFA Inhaler 2 Puff(s) Inhalation every 6 hours PRN Shortness of Breath and/or Wheezing  ondansetron Injectable 4 milliGRAM(s) IV Push every 6 hours PRN Nausea and/or Vomiting  polyethylene glycol 3350 17 Gram(s) Oral daily PRN Constipation      __________________________________________________  REVIEW OF SYSTEMS:  Unable to obtain due to patient's mental status (confused)    Vital Signs Last 24 Hrs  T(C): 36.3 (04 Jun 2021 16:36), Max: 36.6 (04 Jun 2021 04:54)  T(F): 97.3 (04 Jun 2021 16:36), Max: 97.9 (04 Jun 2021 04:54)  HR: 85 (04 Jun 2021 16:36) (62 - 85)  BP: 101/69 (04 Jun 2021 16:36) (101/69 - 150/87)  BP(mean): --  RR: 19 (04 Jun 2021 16:36) (16 - 19)  SpO2: 100% (04 Jun 2021 16:36) (100% - 100%)    ________________________________________________  PHYSICAL EXAM:  GENERAL:  frail, b/l temporal wasting, lethargic   HEENT: Normocephalic;  conjunctivae and sclerae clear; moist mucous membranes;   NECK : supple  CHEST/LUNG: Clear to auscultation bilaterally with good air entry   HEART: S1 S2  regular; no murmurs, gallops or rubs  ABDOMEN: Soft, Nontender, Nondistended; Bowel sounds present  EXTREMITIES: +2 LE edema L>R   SKIN: warm and dry; no rash  NERVOUS SYSTEM:  Awake and alert; oriented to self   _________________________________________________  LABS:                        8.6    6.57  )-----------( 232      ( 04 Jun 2021 11:10 )             27.4     06-04    147<H>  |  116<H>  |  9   ----------------------------<  97  3.5   |  25  |  0.59    Ca    9.1      04 Jun 2021 06:50    TPro  5.0<L>  /  Alb  2.2<L>  /  TBili  0.5  /  DBili  x   /  AST  22  /  ALT  9<L>  /  AlkPhos  86  06-04    PT/INR - ( 04 Jun 2021 06:50 )   PT: 12.8 sec;   INR: 1.08 ratio         PTT - ( 04 Jun 2021 06:50 )  PTT:25.8 sec    CAPILLARY BLOOD GLUCOSE      POCT Blood Glucose.: 102 mg/dL (04 Jun 2021 11:52)  POCT Blood Glucose.: 115 mg/dL (04 Jun 2021 07:55)  POCT Blood Glucose.: 85 mg/dL (03 Jun 2021 21:09)  POCT Blood Glucose.: 162 mg/dL (03 Jun 2021 17:08)        RADIOLOGY & ADDITIONAL TESTS:      Imaging  Reviewed:  YES    Consultant(s) Notes Reviewed:   YES      Plan of care was discussed with patient and /or primary care giver; all questions and concerns were addressed

## 2021-06-04 NOTE — PROGRESS NOTE ADULT - ASSESSMENT
· Assessment	  75 year old lady presented with rectal bleeding.  CT showed rectal thickening, adnexal mass, and DVT.    1. rectal bleeding. will do sigmoidoscopy  please give her enema  discussed with Dr. Galvez  poor prep  large hemorrhoid, ?mass  need good prep but she pulled NGT    2. adnexal mass  will do transvaginal ultrasound  check ca 125    3. DVT  not on AC yet due to rectal bleeding  no bleeding today so far  for sigmoidoscopy  she has rectal bleeding again  need IVC filter  IR said it can not be done  but right femoral vein is clear  vascular to follow

## 2021-06-04 NOTE — PROVIDER CONTACT NOTE (OTHER) - SITUATION
Pt was given Tap water enema as ordered for pt as she is scheduled for colonoscopy today. Pt states she feels fine, VS done and Shu NP, was notified. Will endorse to AM nurse.

## 2021-06-04 NOTE — PROGRESS NOTE ADULT - SUBJECTIVE AND OBJECTIVE BOX
NP Note discussed with Primary Attending    Patient is a 75y old  Female who presents with a chief complaint of rectal bleed       INTERVAL HPI/OVERNIGHT EVENTS: Pt received water enema prior to scheduled colonoscopy this morning however she started having bright red stool with some clots.       MEDICATIONS  (STANDING):  carbidopa/levodopa/entacapone 12.5/50/200 1 Tablet(s) Oral three times a day  dextrose 5%. 1000 milliLiter(s) (60 mL/Hr) IV Continuous <Continuous>  enalapril 20 milliGRAM(s) Oral two times a day  insulin lispro (ADMELOG) corrective regimen sliding scale   SubCutaneous Before meals and at bedtime  oxybutynin XL 5 milliGRAM(s) Oral daily  pantoprazole  Injectable 40 milliGRAM(s) IV Push two times a day  rasagiline Tablet 1 milliGRAM(s) Oral daily  senna 2 Tablet(s) Oral at bedtime    MEDICATIONS  (PRN):  acetaminophen   Tablet .. 650 milliGRAM(s) Oral every 6 hours PRN Temp greater or equal to 38C (100.4F), Moderate Pain (4 - 6)  ALBUTerol    90 MICROgram(s) HFA Inhaler 2 Puff(s) Inhalation every 6 hours PRN Shortness of Breath and/or Wheezing  ondansetron Injectable 4 milliGRAM(s) IV Push every 6 hours PRN Nausea and/or Vomiting  polyethylene glycol 3350 17 Gram(s) Oral daily PRN Constipation      __________________________________________________  REVIEW OF SYSTEMS:  Unable to obtain due to patient's mental status (confused)      Vital Signs Last 24 Hrs  T(C): 36.6 (04 Jun 2021 04:54), Max: 36.9 (03 Jun 2021 19:17)  T(F): 97.9 (04 Jun 2021 04:54), Max: 98.4 (03 Jun 2021 19:17)  HR: 63 (04 Jun 2021 07:58) (62 - 80)  BP: 115/66 (04 Jun 2021 07:58) (110/72 - 150/87)  BP(mean): --  RR: 17 (04 Jun 2021 07:58) (16 - 19)  SpO2: 100% (04 Jun 2021 07:58) (100% - 100%)    ________________________________________________  PHYSICAL EXAM:  GENERAL:  frail, b/l temporal wasting, lethargic   HEENT: Normocephalic;  conjunctivae and sclerae clear; moist mucous membranes;   NECK : supple  CHEST/LUNG: Clear to auscultation bilaterally with good air entry   HEART: S1 S2  regular; no murmurs, gallops or rubs  ABDOMEN: Soft, Nontender, Nondistended; Bowel sounds present  EXTREMITIES: +2 LE edema L>R   SKIN: warm and dry; no rash  NERVOUS SYSTEM:  Awake and alert; Oriented to person only     _________________________________________________  LABS:                        8.6    6.57  )-----------( 232      ( 04 Jun 2021 11:10 )             27.4     06-04    147<H>  |  116<H>  |  9   ----------------------------<  97  3.5   |  25  |  0.59    Ca    9.1      04 Jun 2021 06:50    TPro  5.0<L>  /  Alb  2.2<L>  /  TBili  0.5  /  DBili  x   /  AST  22  /  ALT  9<L>  /  AlkPhos  86  06-04    PT/INR - ( 04 Jun 2021 06:50 )   PT: 12.8 sec;   INR: 1.08 ratio         PTT - ( 04 Jun 2021 06:50 )  PTT:25.8 sec    CAPILLARY BLOOD GLUCOSE      POCT Blood Glucose.: 102 mg/dL (04 Jun 2021 11:52)  POCT Blood Glucose.: 115 mg/dL (04 Jun 2021 07:55)  POCT Blood Glucose.: 85 mg/dL (03 Jun 2021 21:09)  POCT Blood Glucose.: 162 mg/dL (03 Jun 2021 17:08)        RADIOLOGY & ADDITIONAL TESTS:    Imaging  Reviewed:  YES    Consultant(s) Notes Reviewed:   YES      Plan of care was discussed with patient and /or primary care giver; all questions and concerns were addressed  NP Note discussed with Primary Attending    Patient is a 75y old  Female who presents with a chief complaint of rectal bleed       INTERVAL HPI/OVERNIGHT EVENTS: Pt received water enema prior to scheduled colonoscopy this morning however she started having bright red stool with some clots.       MEDICATIONS  (STANDING):  carbidopa/levodopa/entacapone 12.5/50/200 1 Tablet(s) Oral three times a day  dextrose 5%. 1000 milliLiter(s) (60 mL/Hr) IV Continuous <Continuous>  enalapril 20 milliGRAM(s) Oral two times a day  insulin lispro (ADMELOG) corrective regimen sliding scale   SubCutaneous Before meals and at bedtime  oxybutynin XL 5 milliGRAM(s) Oral daily  pantoprazole  Injectable 40 milliGRAM(s) IV Push two times a day  rasagiline Tablet 1 milliGRAM(s) Oral daily  senna 2 Tablet(s) Oral at bedtime    MEDICATIONS  (PRN):  acetaminophen   Tablet .. 650 milliGRAM(s) Oral every 6 hours PRN Temp greater or equal to 38C (100.4F), Moderate Pain (4 - 6)  ALBUTerol    90 MICROgram(s) HFA Inhaler 2 Puff(s) Inhalation every 6 hours PRN Shortness of Breath and/or Wheezing  ondansetron Injectable 4 milliGRAM(s) IV Push every 6 hours PRN Nausea and/or Vomiting  polyethylene glycol 3350 17 Gram(s) Oral daily PRN Constipation      __________________________________________________  REVIEW OF SYSTEMS:  Unable to obtain due to patient's mental status (confused)      Vital Signs Last 24 Hrs  T(C): 36.6 (04 Jun 2021 04:54), Max: 36.9 (03 Jun 2021 19:17)  T(F): 97.9 (04 Jun 2021 04:54), Max: 98.4 (03 Jun 2021 19:17)  HR: 63 (04 Jun 2021 07:58) (62 - 80)  BP: 115/66 (04 Jun 2021 07:58) (110/72 - 150/87)  BP(mean): --  RR: 17 (04 Jun 2021 07:58) (16 - 19)  SpO2: 100% (04 Jun 2021 07:58) (100% - 100%)    ________________________________________________  PHYSICAL EXAM:  GENERAL:  frail, b/l temporal wasting, lethargic   HEENT: Normocephalic;  conjunctivae and sclerae clear; moist mucous membranes;   NECK : supple  CHEST/LUNG: Clear to auscultation bilaterally with good air entry   HEART: S1 S2  regular; no murmurs, gallops or rubs  ABDOMEN: Soft, Nontender, Nondistended; Bowel sounds present  EXTREMITIES: +2 LE edema L>R   SKIN: warm and dry; no rash  NERVOUS SYSTEM:  Awake and alert; oriented to self   _________________________________________________  LABS:                        8.6    6.57  )-----------( 232      ( 04 Jun 2021 11:10 )             27.4     06-04    147<H>  |  116<H>  |  9   ----------------------------<  97  3.5   |  25  |  0.59    Ca    9.1      04 Jun 2021 06:50    TPro  5.0<L>  /  Alb  2.2<L>  /  TBili  0.5  /  DBili  x   /  AST  22  /  ALT  9<L>  /  AlkPhos  86  06-04    PT/INR - ( 04 Jun 2021 06:50 )   PT: 12.8 sec;   INR: 1.08 ratio         PTT - ( 04 Jun 2021 06:50 )  PTT:25.8 sec    CAPILLARY BLOOD GLUCOSE      POCT Blood Glucose.: 102 mg/dL (04 Jun 2021 11:52)  POCT Blood Glucose.: 115 mg/dL (04 Jun 2021 07:55)  POCT Blood Glucose.: 85 mg/dL (03 Jun 2021 21:09)  POCT Blood Glucose.: 162 mg/dL (03 Jun 2021 17:08)        RADIOLOGY & ADDITIONAL TESTS:      Imaging  Reviewed:  YES    Consultant(s) Notes Reviewed:   YES      Plan of care was discussed with patient and /or primary care giver; all questions and concerns were addressed

## 2021-06-04 NOTE — DIETITIAN INITIAL EVALUATION ADULT. - ORAL NUTRITION SUPPLEMENTS
when diet is advanced  to clear liquid suggest ensure clear TID , when to full  consider Ensure Enlive TID

## 2021-06-04 NOTE — PROGRESS NOTE ADULT - PROBLEM SELECTOR PLAN 1
-sent from NH for rectal bleeding   - colonoscopy was done on 6/2 however it was incomplete due to poor bowel prep (findings: large internal hemorrhoids and large amount of stool and blood clots. NGT was attempted for bowel prep but pt agitated and kept pulling the NGT. Pt received water enema prior to scheduled colonoscopy this morning however she started having bright red stool with some clots. Repeat CBC done per Dr Galvez (H&H 9.2/28.8 -> 8.6/27.4).   -Pt received transfuse 1 PRBC yesterday (6/3). Colonoscopy scheduled for today. d/w Dr Diez and Dr Galvez   -cont PPI  -monitor CBC   -Transfuse PRN if Hg <7, consent in chart   -GI Dr. Galvez -sent from NH for rectal bleeding   - colonoscopy was done on 6/2 however it was incomplete due to poor bowel prep (findings: large internal hemorrhoids and large amount of stool and blood clots). NGT was attempted for bowel prep but pt agitated and kept pulling the NGT. Pt received water enema prior to scheduled colonoscopy this morning however she started having bright red stool with some clots. Repeat CBC done per Dr Galvez (H&H 9.2/28.8 -> 8.6/27.4).   -Pt received transfuse 1 PRBC yesterday (6/3). Colonoscopy scheduled for today. d/w Dr Diez and Dr Galvez   -cont PPI  -monitor CBC   -Transfuse PRN if Hg <7, consent in chart   -GI Dr. Galvez

## 2021-06-04 NOTE — DIETITIAN INITIAL EVALUATION ADULT. - OTHER INFO
Pt visited. Pt appears thin and cachectic. Loss of muscle mass ( Temporal and clavicles wasting noted )  NFPE performed. Pt talks with low voice pt stated " I am Hungry " Pt is A & O x 2. Poor dentition noted.  Pt seen for NPO / CLEAR liquid =4 days, Pt is NPO at present for Repeat colonoscopy. Pt with Rectal Bleed. Pt is From NH diet - NCS, 2 GM NA THIN LIQUIDS. Bed scale 110 lb. ??  Ht est ~ 5 feet 4 inches . P

## 2021-06-04 NOTE — DIETITIAN INITIAL EVALUATION ADULT. - PROBLEM SELECTOR PLAN 10
discussed GOC with daughter, Batool Johnson ([phone])  patient is DNR/DNI  MOLST form drafted and placed in chart

## 2021-06-04 NOTE — DIETITIAN INITIAL EVALUATION ADULT. - PERTINENT MEDS FT
MEDICATIONS:  acetaminophen   Tablet .. 650 every 6 hours PRN  ALBUTerol    90 MICROgram(s) HFA Inhaler 2 every 6 hours PRN  carbidopa/levodopa/entacapone 12.5/50/200 1 three times a day  dextrose 5%. 1000 <Continuous>  enalapril 20 two times a day  insulin lispro (ADMELOG) corrective regimen sliding scale  Before meals and at bedtime  ondansetron Injectable 4 every 6 hours PRN  oxybutynin XL 5 daily  pantoprazole  Injectable 40 two times a day  polyethylene glycol 3350 17 daily PRN  rasagiline Tablet 1 daily  senna 2 at bedtime

## 2021-06-04 NOTE — PROGRESS NOTE ADULT - ASSESSMENT
75 year old female from [place], with PMH of Parkinson's, COPD, diverticulosis, HTN, DM and HLD, who presented to the ED due to rectal bleed. PT was admitted for medicine for suspected GI bleed (hg stable). CT abdomen showing rectal wall thickening, colonic diverticulosis without diverticulitis, right adnexal mass and DVT noted in LLE (left common femoral, femoral and deep femoral veins). US Duplex Wil. Lower Ext with DVT in the right leg (was unable to examine L leg as patient refused per record).   GI Dr. Galvez consulted and pt was recommended for colonoscopy. Colonoscopy was done on 6/2 however it was incomplete due to poor bowel prep (findings: large internal hemorrhoids and large amount of stool and blood clots. NGT was attempted for bowel prep but pt agitated and kept pulling the NGT  Pt received water enema prior to scheduled colonoscopy this morning however she started having bright red stool with some clots.   On 6/3 hg trending down, pt for 1 PRBC, given signs of GIB, pt is not a candidate for AC, Vascular consulted for IVC filter. Vascular following.    Hem/on Dr Yan recommended transvaginal ultrasound for adnexal mass which most likely be done early next week.        75 year old female from [place], with PMH of Parkinson's, COPD, diverticulosis, HTN, DM and HLD, who presented to the ED due to rectal bleed. PT was admitted for medicine for suspected GI bleed (hg stable). CT abdomen showing rectal wall thickening, colonic diverticulosis without diverticulitis, right adnexal mass and DVT noted in LLE (left common femoral, femoral and deep femoral veins). US Duplex Wil. Lower Ext with DVT in the right leg (was unable to examine L leg as patient refused per record).   GI Dr. Galvez consulted and pt was recommended for colonoscopy. Colonoscopy was done on 6/2 however it was incomplete due to poor bowel prep (findings: large internal hemorrhoids and large amount of stool and blood clots. NGT was attempted for bowel prep but pt agitated and kept pulling the NGT  Pt received water enema prior to scheduled colonoscopy this morning however she started having bright red stool with some clots.   On 6/3 hg trending down, pt for 1 PRBC, given signs of GIB, pt is not a candidate for AC, Vascular consulted for IVC filter. Vascular following.    Hem/on Dr Yan recommended transvaginal ultrasound for adnexal mass. Will decide after colonoscopy is done per Dr Diez      75 year old female from [place], with PMH of Parkinson's, COPD, diverticulosis, HTN, DM and HLD, who presented to the ED due to rectal bleed. PT was admitted for medicine for suspected GI bleed. CT abdomen showing rectal wall thickening, colonic diverticulosis without diverticulitis, right adnexal mass and DVT noted in LLE (left common femoral, femoral and deep femoral veins). US Duplex Wil. Lower Ext with DVT in the right leg (was unable to examine L leg as patient refused).   GI Dr. Galvez consulted and pt was recommended for colonoscopy. Colonoscopy was done on 6/2 however it was incomplete due to poor bowel prep (findings: large internal hemorrhoids and large amount of stool and blood clots). On 6/3 hg trending down (7.4) that pt was transfused 1 PRBC. NGT was attempted for bowel prep but pt became agitated and kept pulling the NGT. Pt received water enema prior to scheduled colonoscopy this morning however she started having bright red stool with some clots.   Pt is not a candidate for AC given signs of GIB. IR was consulted for IVC filter but informed from IR that based on imaging pt is not a candidate for IVF filter with IR. Vascular consult recalled and vascular is following patient.    Hem/on Dr Yan recommended transvaginal ultrasound for adnexal mass. Will decide when to obtain transvaginal u/s after colonoscopy is done per Dr Diez

## 2021-06-04 NOTE — PROGRESS NOTE ADULT - ASSESSMENT
75 year old female from [place], with PMH of Parkinson's, COPD, diverticulosis, HTN, DM and HLD, who presented to the ED due to rectal bleed. PT was admitted for medicine for suspected GI bleed. CT abdomen showing rectal wall thickening, colonic diverticulosis without diverticulitis, right adnexal mass and DVT noted in LLE (left common femoral, femoral and deep femoral veins). US Duplex Wil. Lower Ext with DVT in the right leg (was unable to examine L leg as patient refused).   GI Dr. Galvez consulted and pt was recommended for colonoscopy. Colonoscopy was done on 6/2 however it was incomplete due to poor bowel prep (findings: large internal hemorrhoids and large amount of stool and blood clots). On 6/3 hg trending down (7.4) that pt was transfused 1 PRBC. NGT was attempted for bowel prep but pt became agitated and kept pulling the NGT. Pt received water enema prior to scheduled colonoscopy this morning however she started having bright red stool with some clots.   Pt is not a candidate for AC given signs of GIB. IR was consulted for IVC filter but informed from IR that based on imaging pt is not a candidate for IVF filter with IR. Vascular consult recalled and vascular is following patient.    Hem/on Dr Yan recommended transvaginal ultrasound for adnexal mass. Will decide when to obtain transvaginal u/s after colonoscopy is done per Dr Diez

## 2021-06-04 NOTE — DIETITIAN INITIAL EVALUATION ADULT. - PERTINENT LABORATORY DATA
06-04 Na147 mmol/L<H> Glu 97 mg/dL K+ 3.5 mmol/L Cr  0.59 mg/dL BUN 9 mg/dL   06-02 Phos 2.7 mg/dL   06-04 Alb 2.2 g/dL<L>       06-02 Chol 138 mg/dL LDL --    HDL 39 mg/dL<L> Trig 92 mg/dL  06-02-21 @ 09:38 HgbA1C 5.7 [4.0 - 5.6]

## 2021-06-04 NOTE — PROGRESS NOTE ADULT - PROBLEM SELECTOR PLAN 4
-CT abdomen with right adnexal mass   -Cancer marker 125: 10, GI Ca marker 19-9: <2  -Transvaginal u/s recommended by Dr Yan. Will likely be scheduled for early next week. d/w Dr Diez  -Shana Yan -CT abdomen with right adnexal mass   -Cancer marker 125: 10, GI Ca marker 19-9: <2  -Transvaginal u/s recommended by Dr Yan. Will decide after colonoscopy is done per Dr Diez.   -Heme Dr. Yan -CT abdomen with right adnexal mass   -Cancer marker 125: 10, GI Ca marker 19-9: <2  -Transvaginal u/s recommended by Dr Yan. Will decide when to obtain transvaginal u/s once colonoscopy is done per Dr Diez.   -Shana Yan

## 2021-06-04 NOTE — PROGRESS NOTE ADULT - SUBJECTIVE AND OBJECTIVE BOX
Colonoscopy Report  Indication: Hematochezia   Referring: Mike Galvez MD  Instrument:  # [id number]  Anesthesia: MAC  Consent:  Informed consent was obtained from the patient after providing any opportunity for questions  Procedure: After placing the patient in the left lateral decubitus position, the colonoscope was gently inserted into the rectum and advanced to the cecum. Color, texture, mucosa, and anatomy of the colon were carefully examined with the scope. The patient tolerated the procedure well. After completion of the exam, the patient was transferred to the recovery room.     Preparation:  Findings:   Anal Canal	        Large internal hemorrhoids  Rectum	                Large ulcer bleeding 2 clips applied to stop bleeding  Sigmoid Colon 	        Few diverticula  Descending Colon	Normal  Splenic Flexure	Normal  Transverse Colon	Scattered diverticula  Hepatic Flexure	Normal  Ascending Colon	 Normal  Cecum	                 Normal  Ileo-cecal Valve	 Normal  Ileum 	                 Not intubated     EBL:0    Impression:     1. Rectal ulcer  2. Diverticulosis  3. Internal hemorrhoids      Plan:  1. Follow up path  2. High fiber diet  3. Sitz bath  4. Monitor H/H      Procedure Start Time:  15:23  Cecum Reached Time:  15:29  Procedure End Time:    15:37  Total Withdrawal Time: 8 minutes      Attending:     Mike Galvez MD

## 2021-06-04 NOTE — PROGRESS NOTE ADULT - PROBLEM SELECTOR PLAN 4
-CT abdomen with right adnexal mass   -Cancer marker 125: 10, GI Ca marker 19-9: <2  -Transvaginal u/s recommended by Dr Yan. Will decide when to obtain transvaginal u/s once colonoscopy is done per Dr Diez.   -Shana Yan

## 2021-06-04 NOTE — PROGRESS NOTE ADULT - SUBJECTIVE AND OBJECTIVE BOX
had rectal bleeding again  no sob      MEDICATIONS  (STANDING):  carbidopa/levodopa/entacapone 12.5/50/200 1 Tablet(s) Oral three times a day  dextrose 5% + sodium chloride 0.9%. 1000 milliLiter(s) (75 mL/Hr) IV Continuous <Continuous>  enalapril 20 milliGRAM(s) Oral two times a day  insulin lispro (ADMELOG) corrective regimen sliding scale   SubCutaneous Before meals and at bedtime  oxybutynin XL 5 milliGRAM(s) Oral daily  pantoprazole  Injectable 40 milliGRAM(s) IV Push two times a day  rasagiline Tablet 1 milliGRAM(s) Oral daily  senna 2 Tablet(s) Oral at bedtime    MEDICATIONS  (PRN):  acetaminophen   Tablet .. 650 milliGRAM(s) Oral every 6 hours PRN Temp greater or equal to 38C (100.4F), Moderate Pain (4 - 6)  ALBUTerol    90 MICROgram(s) HFA Inhaler 2 Puff(s) Inhalation every 6 hours PRN Shortness of Breath and/or Wheezing  ondansetron Injectable 4 milliGRAM(s) IV Push every 6 hours PRN Nausea and/or Vomiting  polyethylene glycol 3350 17 Gram(s) Oral daily PRN Constipation      Allergies    No Known Allergies    Intolerances        Vital Signs Last 24 Hrs  T(C): 36.6 (04 Jun 2021 04:54), Max: 36.9 (03 Jun 2021 19:17)  T(F): 97.9 (04 Jun 2021 04:54), Max: 98.4 (03 Jun 2021 19:17)  HR: 63 (04 Jun 2021 07:58) (62 - 80)  BP: 115/66 (04 Jun 2021 07:58) (110/72 - 150/87)  BP(mean): --  RR: 17 (04 Jun 2021 07:58) (16 - 19)  SpO2: 100% (04 Jun 2021 07:58) (99% - 100%)    PHYSICAL EXAM  General: adult in NAD  HEENT: clear oropharynx, anicteric sclera, pink conjunctiva  Neck: supple  CV: normal S1/S2 with no murmur rubs or gallops  Lungs: positive air movement b/l ant lungs,clear to auscultation, no wheezes, no rales  Abdomen: soft non-tender non-distended, no hepatosplenomegaly  Ext: no clubbing cyanosis or edema  Skin: no rashes and no petechiae  Neuro: alert and oriented X 4, no focal deficits  LABS:                          9.2    6.37  )-----------( 238      ( 04 Jun 2021 06:50 )             28.8         Mean Cell Volume : 87.0 fl  Mean Cell Hemoglobin : 27.8 pg  Mean Cell Hemoglobin Concentration : 31.9 gm/dL  Auto Neutrophil # : x  Auto Lymphocyte # : x  Auto Monocyte # : x  Auto Eosinophil # : x  Auto Basophil # : x  Auto Neutrophil % : x  Auto Lymphocyte % : x  Auto Monocyte % : x  Auto Eosinophil % : x  Auto Basophil % : x    Serial CBC  Hematocrit 28.8  Hemoglobin 9.2  Plat 238  RBC 3.31  WBC 6.37  Serial CBC  Hematocrit 23.3  Hemoglobin 7.4  Plat 228  RBC 2.62  WBC 5.11  Serial CBC  Hematocrit 24.9  Hemoglobin 8.0  Plat 256  RBC 2.84  WBC 6.27  Serial CBC  Hematocrit 27.3  Hemoglobin 8.7  Plat 280  RBC 3.11  WBC 7.91  Serial CBC  Hematocrit 28.3  Hemoglobin 8.9  Plat 269  RBC 3.16  WBC 9.15  Serial CBC  Hematocrit 30.4  Hemoglobin 9.8  Plat 304  RBC 3.44  WBC 9.21    06-04    147<H>  |  116<H>  |  9   ----------------------------<  97  3.5   |  25  |  0.59    Ca    9.1      04 Jun 2021 06:50    TPro  5.0<L>  /  Alb  2.2<L>  /  TBili  0.5  /  DBili  x   /  AST  22  /  ALT  9<L>  /  AlkPhos  86  06-04      PT/INR - ( 04 Jun 2021 06:50 )   PT: 12.8 sec;   INR: 1.08 ratio         PTT - ( 04 Jun 2021 06:50 )  PTT:25.8 sec    Ferritin, Serum: 451 ng/mL (06-02 @ 09:41)  Folate, Serum: 8.1 ng/mL (06-02 @ 09:41)  Vitamin B12, Serum: 445 pg/mL (06-02 @ 09:41)  Iron - Total Binding Capacity.: 169 ug/dL (06-02 @ 06:23)            BLOOD SMEAR INTERPRETATION:       RADIOLOGY & ADDITIONAL STUDIES:

## 2021-06-04 NOTE — PROGRESS NOTE ADULT - TIME BILLING
patient with rectal bleed and dvts   care plan as above   to follow up gi and vascular recommendations  care plan d/w np , and patient's family

## 2021-06-05 LAB
ALBUMIN SERPL ELPH-MCNC: 2.1 G/DL — LOW (ref 3.5–5)
ALP SERPL-CCNC: 74 U/L — SIGNIFICANT CHANGE UP (ref 40–120)
ALT FLD-CCNC: <6 U/L DA — LOW (ref 10–60)
ANION GAP SERPL CALC-SCNC: 4 MMOL/L — LOW (ref 5–17)
APTT BLD: 181.6 SEC — CRITICAL HIGH (ref 27.5–35.5)
APTT BLD: 28.6 SEC — SIGNIFICANT CHANGE UP (ref 27.5–35.5)
AST SERPL-CCNC: 15 U/L — SIGNIFICANT CHANGE UP (ref 10–40)
BILIRUB SERPL-MCNC: 0.5 MG/DL — SIGNIFICANT CHANGE UP (ref 0.2–1.2)
BLD GP AB SCN SERPL QL: SIGNIFICANT CHANGE UP
BUN SERPL-MCNC: 9 MG/DL — SIGNIFICANT CHANGE UP (ref 7–18)
CALCIUM SERPL-MCNC: 8.4 MG/DL — SIGNIFICANT CHANGE UP (ref 8.4–10.5)
CHLORIDE SERPL-SCNC: 112 MMOL/L — HIGH (ref 96–108)
CO2 SERPL-SCNC: 24 MMOL/L — SIGNIFICANT CHANGE UP (ref 22–31)
CREAT SERPL-MCNC: 0.52 MG/DL — SIGNIFICANT CHANGE UP (ref 0.5–1.3)
GLUCOSE BLDC GLUCOMTR-MCNC: 137 MG/DL — HIGH (ref 70–99)
GLUCOSE BLDC GLUCOMTR-MCNC: 147 MG/DL — HIGH (ref 70–99)
GLUCOSE BLDC GLUCOMTR-MCNC: 151 MG/DL — HIGH (ref 70–99)
GLUCOSE BLDC GLUCOMTR-MCNC: 159 MG/DL — HIGH (ref 70–99)
GLUCOSE SERPL-MCNC: 134 MG/DL — HIGH (ref 70–99)
HCT VFR BLD CALC: 22.4 % — LOW (ref 34.5–45)
HCT VFR BLD CALC: 26.9 % — LOW (ref 34.5–45)
HGB BLD-MCNC: 7.4 G/DL — LOW (ref 11.5–15.5)
HGB BLD-MCNC: 8.9 G/DL — LOW (ref 11.5–15.5)
INR BLD: 1.06 RATIO — SIGNIFICANT CHANGE UP (ref 0.88–1.16)
INR BLD: 1.14 RATIO — SIGNIFICANT CHANGE UP (ref 0.88–1.16)
MCHC RBC-ENTMCNC: 28.2 PG — SIGNIFICANT CHANGE UP (ref 27–34)
MCHC RBC-ENTMCNC: 28.6 PG — SIGNIFICANT CHANGE UP (ref 27–34)
MCHC RBC-ENTMCNC: 33 GM/DL — SIGNIFICANT CHANGE UP (ref 32–36)
MCHC RBC-ENTMCNC: 33.1 GM/DL — SIGNIFICANT CHANGE UP (ref 32–36)
MCV RBC AUTO: 85.1 FL — SIGNIFICANT CHANGE UP (ref 80–100)
MCV RBC AUTO: 86.5 FL — SIGNIFICANT CHANGE UP (ref 80–100)
NRBC # BLD: 0 /100 WBCS — SIGNIFICANT CHANGE UP (ref 0–0)
NRBC # BLD: 0 /100 WBCS — SIGNIFICANT CHANGE UP (ref 0–0)
PLATELET # BLD AUTO: 223 K/UL — SIGNIFICANT CHANGE UP (ref 150–400)
PLATELET # BLD AUTO: 239 K/UL — SIGNIFICANT CHANGE UP (ref 150–400)
POTASSIUM SERPL-MCNC: 3.7 MMOL/L — SIGNIFICANT CHANGE UP (ref 3.5–5.3)
POTASSIUM SERPL-SCNC: 3.7 MMOL/L — SIGNIFICANT CHANGE UP (ref 3.5–5.3)
PROT SERPL-MCNC: 4.5 G/DL — LOW (ref 6–8.3)
PROTHROM AB SERPL-ACNC: 12.6 SEC — SIGNIFICANT CHANGE UP (ref 10.6–13.6)
PROTHROM AB SERPL-ACNC: 13.5 SEC — SIGNIFICANT CHANGE UP (ref 10.6–13.6)
RBC # BLD: 2.59 M/UL — LOW (ref 3.8–5.2)
RBC # BLD: 3.16 M/UL — LOW (ref 3.8–5.2)
RBC # FLD: 15.7 % — HIGH (ref 10.3–14.5)
RBC # FLD: 16.9 % — HIGH (ref 10.3–14.5)
SODIUM SERPL-SCNC: 140 MMOL/L — SIGNIFICANT CHANGE UP (ref 135–145)
WBC # BLD: 6.07 K/UL — SIGNIFICANT CHANGE UP (ref 3.8–10.5)
WBC # BLD: 6.81 K/UL — SIGNIFICANT CHANGE UP (ref 3.8–10.5)
WBC # FLD AUTO: 6.07 K/UL — SIGNIFICANT CHANGE UP (ref 3.8–10.5)
WBC # FLD AUTO: 6.81 K/UL — SIGNIFICANT CHANGE UP (ref 3.8–10.5)

## 2021-06-05 RX ORDER — HEPARIN SODIUM 5000 [USP'U]/ML
700 INJECTION INTRAVENOUS; SUBCUTANEOUS
Qty: 25000 | Refills: 0 | Status: DISCONTINUED | OUTPATIENT
Start: 2021-06-05 | End: 2021-06-06

## 2021-06-05 RX ORDER — HEPARIN SODIUM 5000 [USP'U]/ML
2000 INJECTION INTRAVENOUS; SUBCUTANEOUS ONCE
Refills: 0 | Status: COMPLETED | OUTPATIENT
Start: 2021-06-05 | End: 2021-06-05

## 2021-06-05 RX ADMIN — CARBIDOPA, LEVODOPA, AND ENTACAPONE 1 TABLET(S): 50; 200; 200 TABLET, FILM COATED ORAL at 14:07

## 2021-06-05 RX ADMIN — Medication 20 MILLIGRAM(S): at 17:06

## 2021-06-05 RX ADMIN — Medication 1: at 17:20

## 2021-06-05 RX ADMIN — SENNA PLUS 2 TABLET(S): 8.6 TABLET ORAL at 21:14

## 2021-06-05 RX ADMIN — Medication 5 MILLIGRAM(S): at 11:09

## 2021-06-05 RX ADMIN — RASAGILINE 1 MILLIGRAM(S): 0.5 TABLET ORAL at 11:09

## 2021-06-05 RX ADMIN — Medication 1: at 07:46

## 2021-06-05 RX ADMIN — Medication 0: at 21:13

## 2021-06-05 RX ADMIN — PANTOPRAZOLE SODIUM 40 MILLIGRAM(S): 20 TABLET, DELAYED RELEASE ORAL at 17:06

## 2021-06-05 RX ADMIN — HEPARIN SODIUM 7 UNIT(S)/HR: 5000 INJECTION INTRAVENOUS; SUBCUTANEOUS at 17:12

## 2021-06-05 RX ADMIN — CARBIDOPA, LEVODOPA, AND ENTACAPONE 1 TABLET(S): 50; 200; 200 TABLET, FILM COATED ORAL at 21:13

## 2021-06-05 RX ADMIN — HEPARIN SODIUM 2000 UNIT(S): 5000 INJECTION INTRAVENOUS; SUBCUTANEOUS at 17:06

## 2021-06-05 RX ADMIN — CARBIDOPA, LEVODOPA, AND ENTACAPONE 1 TABLET(S): 50; 200; 200 TABLET, FILM COATED ORAL at 05:08

## 2021-06-05 RX ADMIN — PANTOPRAZOLE SODIUM 40 MILLIGRAM(S): 20 TABLET, DELAYED RELEASE ORAL at 05:08

## 2021-06-05 RX ADMIN — SODIUM CHLORIDE 60 MILLILITER(S): 9 INJECTION, SOLUTION INTRAVENOUS at 05:10

## 2021-06-05 NOTE — PROGRESS NOTE ADULT - SUBJECTIVE AND OBJECTIVE BOX
[   ] ICU                                          [   ] CCU                                      [  X ] Medical Floor    Patient is a 75 year old with rectal bleeding. GI consulted to evaluate.      Patient is a 75 year old female from [place], with past medical history significant for Parkinson's, COPD, diverticulosis, HTN, DM and HLD, who presented to the ED due to rectal bleed. History is limited from patient due to her being a poor historian. As per ED provider note, patient was sent to the ED for concern for vaginal bleed. In ED, patient is noted to have rectal bleed instead of vaginal bleed. Patient is confused unable to provide any history. No abdominal pain, nausea, vomiting, hematemesis, melena, fever, chills, chest pain, SOB, cough, hematuria, dysuria or diarrhea reported.       Today patient appears comfortable with no new complaints. No abdominal pain, N/V, hematemesis, hematochezia, melena, fever, chills, chest pain, SOB, cough or diarrhea reported.    PAIN MANAGEMENT:  Pain Scale:                0/10  Pain Location:      Prior Colonoscopy: Unknown    PAST MEDICAL HISTORY  Parkinson disease  COPD  Diverticulosis  HTN (hypertension)  Diabetes mellitus  HLD (hyperlipidemia)      PAST SURGICAL HISTORY  No significant past surgical history      Allergies    No Known Allergies    Intolerances  None        SOCIAL HISTORY  Advanced Directives:       [ X ] Full Code       [  ] DNR  Marital Status:         [  ] M      [ X ] S      [  ] D       [  ] W  Children:       [ X ] Yes      [  ] No  Occupation:        [  ] Employed       [ X ] Unemployed       [  ] Retired  Diet:       [ X ] Regular       [  ] PEG feeding          [  ] NG tube feeding  Drug Use:           [ X ] Patient denied          [  ] Yes  Alcohol:           [ X ] No             [  ] Yes (socially)         [  ] Yes (chronic)  Tobacco:           [  ] Yes           [ X ] No    FAMILY HISTORY  [ X ] Heart Disease            [ X ] Diabetes             [ X ] HTN             [  ] Colon Cancer             [  ] Stomach Cancer              [  ] Pancreatic Cancer        VITALS  Vital Signs Last 24 Hrs  T(C): 36.5 (05 Jun 2021 11:08), Max: 36.5 (05 Jun 2021 11:08)  T(F): 97.7 (05 Jun 2021 11:08), Max: 97.7 (05 Jun 2021 11:08)  HR: 67 (05 Jun 2021 11:08) (67 - 85)  BP: 123/71 (05 Jun 2021 11:08) (83/48 - 124/72)   RR: 18 (05 Jun 2021 11:08) (18 - 19)  SpO2: 98% (05 Jun 2021 11:08) (98% - 100%)       MEDICATIONS  (STANDING):  carbidopa/levodopa/entacapone 12.5/50/200 1 Tablet(s) Oral three times a day  dextrose 5%. 1000 milliLiter(s) (60 mL/Hr) IV Continuous <Continuous>  enalapril 20 milliGRAM(s) Oral two times a day  insulin lispro (ADMELOG) corrective regimen sliding scale   SubCutaneous Before meals and at bedtime  oxybutynin XL 5 milliGRAM(s) Oral daily  pantoprazole  Injectable 40 milliGRAM(s) IV Push two times a day  rasagiline Tablet 1 milliGRAM(s) Oral daily  senna 2 Tablet(s) Oral at bedtime    MEDICATIONS  (PRN):  acetaminophen   Tablet .. 650 milliGRAM(s) Oral every 6 hours PRN Temp greater or equal to 38C (100.4F), Moderate Pain (4 - 6)  ALBUTerol    90 MICROgram(s) HFA Inhaler 2 Puff(s) Inhalation every 6 hours PRN Shortness of Breath and/or Wheezing  ondansetron Injectable 4 milliGRAM(s) IV Push every 6 hours PRN Nausea and/or Vomiting  polyethylene glycol 3350 17 Gram(s) Oral daily PRN Constipation                            7.4    6.07  )-----------( 239      ( 05 Jun 2021 08:23 )             22.4       06-05    140  |  112<H>  |  9   ----------------------------<  134<H>  3.7   |  24  |  0.52    Ca    8.4      05 Jun 2021 08:23    TPro  4.5<L>  /  Alb  2.1<L>  /  TBili  0.5  /  DBili  x   /  AST  15  /  ALT  <6<L>  /  AlkPhos  74  06-05      PT/INR - ( 05 Jun 2021 08:23 )   PT: 13.5 sec;   INR: 1.14 ratio         PTT - ( 04 Jun 2021 06:50 )  PTT:25.8 sec

## 2021-06-05 NOTE — PROGRESS NOTE ADULT - SUBJECTIVE AND OBJECTIVE BOX
Patient is a 75y old  Female who presents with a chief complaint of rectal bleed       INTERVAL HPI/OVERNIGHT EVENTS:   doing ok , no more bleed  ,     MEDICATIONS  (STANDING):  carbidopa/levodopa/entacapone 12.5/50/200 1 Tablet(s) Oral three times a day  dextrose 5%. 1000 milliLiter(s) (60 mL/Hr) IV Continuous <Continuous>  enalapril 20 milliGRAM(s) Oral two times a day  insulin lispro (ADMELOG) corrective regimen sliding scale   SubCutaneous Before meals and at bedtime  oxybutynin XL 5 milliGRAM(s) Oral daily  pantoprazole  Injectable 40 milliGRAM(s) IV Push two times a day  rasagiline Tablet 1 milliGRAM(s) Oral daily  senna 2 Tablet(s) Oral at bedtime    MEDICATIONS  (PRN):  acetaminophen   Tablet .. 650 milliGRAM(s) Oral every 6 hours PRN Temp greater or equal to 38C (100.4F), Moderate Pain (4 - 6)  ALBUTerol    90 MICROgram(s) HFA Inhaler 2 Puff(s) Inhalation every 6 hours PRN Shortness of Breath and/or Wheezing  ondansetron Injectable 4 milliGRAM(s) IV Push every 6 hours PRN Nausea and/or Vomiting  polyethylene glycol 3350 17 Gram(s) Oral daily PRN Constipation      __________________________________________________  REVIEW OF SYSTEMS:  Unable to obtain due to patient's mental status (confused)    Vital Signs Last 24 Hrs  T(C): 36.5 (05 Jun 2021 11:08), Max: 36.5 (05 Jun 2021 11:08)  T(F): 97.7 (05 Jun 2021 11:08), Max: 97.7 (05 Jun 2021 11:08)  HR: 67 (05 Jun 2021 11:08) (67 - 85)  BP: 123/71 (05 Jun 2021 11:08) (83/48 - 123/71)  BP(mean): --  RR: 18 (05 Jun 2021 11:08) (18 - 19)  SpO2: 98% (05 Jun 2021 11:08) (98% - 100%)    ________________________________________________  PHYSICAL EXAM:  GENERAL:  frail, b/l temporal wasting, lethargic   HEENT: Normocephalic;  conjunctivae and sclerae clear; moist mucous membranes;   NECK : supple  CHEST/LUNG: Clear to auscultation bilaterally with good air entry   HEART: S1 S2  regular; no murmurs, gallops or rubs  ABDOMEN: Soft, Nontender, Nondistended; Bowel sounds present  EXTREMITIES: +2 LE edema L>R   SKIN: warm and dry; no rash  NERVOUS SYSTEM:  Awake and alert; oriented to self   _________________________________________________    LABS:                        7.4    6.07  )-----------( 239      ( 05 Jun 2021 08:23 )             22.4     06-05    140  |  112<H>  |  9   ----------------------------<  134<H>  3.7   |  24  |  0.52    Ca    8.4      05 Jun 2021 08:23    TPro  4.5<L>  /  Alb  2.1<L>  /  TBili  0.5  /  DBili  x   /  AST  15  /  ALT  <6<L>  /  AlkPhos  74  06-05    PT/INR - ( 05 Jun 2021 08:23 )   PT: 13.5 sec;   INR: 1.14 ratio         PTT - ( 04 Jun 2021 06:50 )  PTT:25.8 sec    colonoscopy no bleed , hemorrhoid, rectal ulcer                   LABS:                        8.6    6.57  )-----------( 232      ( 04 Jun 2021 11:10 )             27.4     06-04    147<H>  |  116<H>  |  9   ----------------------------<  97  3.5   |  25  |  0.59    Ca    9.1      04 Jun 2021 06:50    TPro  5.0<L>  /  Alb  2.2<L>  /  TBili  0.5  /  DBili  x   /  AST  22  /  ALT  9<L>  /  AlkPhos  86  06-04    PT/INR - ( 04 Jun 2021 06:50 )   PT: 12.8 sec;   INR: 1.08 ratio         PTT - ( 04 Jun 2021 06:50 )  PTT:25.8 sec    CAPILLARY BLOOD GLUCOSE      POCT Blood Glucose.: 102 mg/dL (04 Jun 2021 11:52)  POCT Blood Glucose.: 115 mg/dL (04 Jun 2021 07:55)  POCT Blood Glucose.: 85 mg/dL (03 Jun 2021 21:09)  POCT Blood Glucose.: 162 mg/dL (03 Jun 2021 17:08)        RADIOLOGY & ADDITIONAL TESTS:      Imaging  Reviewed:  YES    Consultant(s) Notes Reviewed:   YES      Plan of care was discussed with patient and /or primary care giver; all questions and concerns were addressed

## 2021-06-05 NOTE — PROGRESS NOTE ADULT - PROBLEM SELECTOR PLAN 4
-CT abdomen with right adnexal mass   -Cancer marker 125: 10, GI Ca marker 19-9: <2  -Transvaginal u/s recommended by Dr Yan. Will decide when to obtain transvaginal u/s  early next week  -Shana Yan

## 2021-06-05 NOTE — PROGRESS NOTE ADULT - ASSESSMENT
1. Rectal bleeding  2. Anemia  3. S/p Colonoscopy  4. Rectal ulcer  5. Diverticulosis  6. Large hemorrhoids  7. No evidence of acute GI bleeding at present time    Suggestions:    1. Monitor H/H  2. Transfuse PRBC as needed  3. Avoid NSAID  4. Protonix 40mg daily  5. GYN evaluation  7. DVT prophylaxis

## 2021-06-05 NOTE — CHART NOTE - NSCHARTNOTEFT_GEN_A_CORE
Event:  Pt c/o     Brief HPI: 75 year old female from [place], with PMH of Parkinson's, COPD, diverticulosis, HTN, DM and HLD, who presented to the ED due to rectal bleed. PT was admitted for medicine for suspected GI bleed. CT abdomen showing rectal wall thickening, colonic diverticulosis without diverticulitis, right adnexal mass and BL LE Doppler US showed "Normal compressibility of the RIGHT common femoral vein. Noncompressible thrombi are noted in the peroneal vein, popliteal vein and lower femoral vein, compatible with deep vein thrombosis. LEFT: The patient refused examination of the left leg."    GI Dr. Galvez consulted and pt was recommended for colonoscopy. Colonoscopy was done on 6/2 however it was incomplete due to poor bowel prep (findings: large internal hemorrhoids and large amount of stool and blood clots). On 6/3 hg trending down (7.4) that pt was transfused 1 PRBC. NGT was attempted for bowel prep but pt became agitated and kept pulling the NGT. Pt received water enema prior to scheduled colonoscopy however she started having bright red stool with some clots. Initially AC was not given due to signs of GIB. IR was consulted for IVC filter for DVT. Per IR pt is not a candidate for IVF filter with IR at this time. Vascular consult recalled and vascular is following patient.      Patient had a colonoscopy with Dr. Galvez on 6/4 which showed  Findings:   Anal canal - Large internal hemorrhoids  Rectum -  Large ulcer bleeding 2 clips applied to stop bleeding  Sigmoid Colon 	        Few diverticula  Descending Colon	Normal  Splenic Flexure	Normal  Transverse Colon	Scattered diverticula  Hepatic Flexure	Normal  Ascending Colon	 Normal  Cecum	                 Normal  Ileo-cecal Valve	 Normal  Ileum 	                 Not intubated     EBL:0    Impression:     1. Rectal ulcer  2. Diverticulosis  3. Internal hemorrhoids    INTERVAL EVENTS:  This morning patient with downtrending Hgb. Ordered for 1 unit PRBC in setting of anemia. Dr. Yan and Dr. Diez consulted. Per Dr. Yan and Dr. Diez, patient okay to begin full AC with heparin for DVT in RLE.       Vital Signs Last 24 Hrs  T(C): 36.5 (05 Jun 2021 11:08), Max: 36.5 (05 Jun 2021 11:08)  T(F): 97.7 (05 Jun 2021 11:08), Max: 97.7 (05 Jun 2021 11:08)  HR: 67 (05 Jun 2021 11:08) (67 - 85)  BP: 123/71 (05 Jun 2021 11:08) (83/48 - 123/71)  BP(mean): --  RR: 18 (05 Jun 2021 11:08) (18 - 19)  SpO2: 98% (05 Jun 2021 11:08) (98% - 100%)    Labs:                         7.4    6.07  )-----------( 239      ( 05 Jun 2021 08:23 )             22.4   06-05    140  |  112<H>  |  9   ----------------------------<  134<H>  3.7   |  24  |  0.52    Ca    8.4      05 Jun 2021 08:23    TPro  4.5<L>  /  Alb  2.1<L>  /  TBili  0.5  /  DBili  x   /  AST  15  /  ALT  <6<L>  /  AlkPhos  74  06-05    PT/INR - ( 05 Jun 2021 08:23 )   PT: 13.5 sec;   INR: 1.14 ratio         PTT - ( 04 Jun 2021 06:50 )  PTT:25.8 sec      Imaging:  u;< from: US Duplex Venous Lower Ext Complete, Bilateral (06.01.21 @ 14:06) >  EXAM:  US DPLX LWR EXT VEINS COMPL BI                        PROCEDURE DATE:  06/01/2021    INTERPRETATION:  CLINICAL INFORMATION: History of knee surgery  COMPARISON: None available.  TECHNIQUE: Duplex sonography of the BILATERAL LOWER extremity veins with color and spectral Doppler, with and without compression.  FINDINGS:  RIGHT:  Normal compressibility of the RIGHT common femoral vein. Noncompressible thrombi are noted in the peroneal vein, popliteal vein and lower femoralvein, compatible with deep vein thrombosis.  LEFT: The patient refused examination of the left leg.  IMPRESSION:  Deep vein thrombosis in the right leg as above.  Dr. Arellano is aware.  The patient refused examination of the left leg.      MEDICATIONS  (STANDING):  carbidopa/levodopa/entacapone 12.5/50/200 1 Tablet(s) Oral three times a day  dextrose 5%. 1000 milliLiter(s) (60 mL/Hr) IV Continuous <Continuous>  enalapril 20 milliGRAM(s) Oral two times a day  heparin   Injectable 2000 Unit(s) IV Push once  heparin  Infusion 700 Unit(s)/Hr (7 mL/Hr) IV Continuous <Continuous>  insulin lispro (ADMELOG) corrective regimen sliding scale   SubCutaneous Before meals and at bedtime  oxybutynin XL 5 milliGRAM(s) Oral daily  pantoprazole  Injectable 40 milliGRAM(s) IV Push two times a day  rasagiline Tablet 1 milliGRAM(s) Oral daily  senna 2 Tablet(s) Oral at bedtime    MEDICATIONS  (PRN):  acetaminophen   Tablet .. 650 milliGRAM(s) Oral every 6 hours PRN Temp greater or equal to 38C (100.4F), Moderate Pain (4 - 6)  ALBUTerol    90 MICROgram(s) HFA Inhaler 2 Puff(s) Inhalation every 6 hours PRN Shortness of Breath and/or Wheezing  ondansetron Injectable 4 milliGRAM(s) IV Push every 6 hours PRN Nausea and/or Vomiting  polyethylene glycol 3350 17 Gram(s) Oral daily PRN Constipation    A/P:  Pt is a 75 year old female from [place], with PMH of Parkinson's, COPD, diverticulosis, HTN, DM and HLD, who presented to the ED due to rectal bleed, s/p colonoscopy, c/b RLE DVT    Plan:    1. Continue blood transfusion as ordered this morning;              2. Repeat CBC/aptt/pt/inr after transfusion             3. Start heparin, patient specific due to low weight with bolus of 2000units of IVP Heparin, followed by Heparin infusion of 7ml/hr             4. Repeat aPTT every 6 hours to maintain goal range of 55-60 seconds; Provider specific heparin dose adjustment as nomogram not applied due to patient                specific needs; Covering team to continue monitoring labs to maintain goal (Per Dr. Yan/Dr. Diez)             3. Discussed dosing, and plan with Dr. Yan and Dr. Diez, who agree with above.             - Remainder of plan per attending note from today. Brief HPI: 75 year old female from [place], with PMH of Parkinson's, COPD, diverticulosis, HTN, DM and HLD, who presented to the ED due to rectal bleed. PT was admitted for medicine for suspected GI bleed. CT abdomen showing rectal wall thickening, colonic diverticulosis without diverticulitis, right adnexal mass and BL LE Doppler US showed "Normal compressibility of the RIGHT common femoral vein. Noncompressible thrombi are noted in the peroneal vein, popliteal vein and lower femoral vein, compatible with deep vein thrombosis. LEFT: The patient refused examination of the left leg."    GI Dr. Galvez consulted and pt was recommended for colonoscopy. Colonoscopy was done on 6/2 however it was incomplete due to poor bowel prep (findings: large internal hemorrhoids and large amount of stool and blood clots). On 6/3 hg trending down (7.4) that pt was transfused 1 PRBC. NGT was attempted for bowel prep but pt became agitated and kept pulling the NGT. Pt received water enema prior to scheduled colonoscopy however she started having bright red stool with some clots. Initially AC was not given due to signs of GIB. IR was consulted for IVC filter for DVT. Per IR pt is not a candidate for IVF filter with IR at this time. Vascular consult recalled and vascular is following patient.      Patient had a colonoscopy with Dr. Galvez on 6/4 which showed  Findings:   Anal canal - Large internal hemorrhoids  Rectum -  Large ulcer bleeding 2 clips applied to stop bleeding  Sigmoid Colon 	        Few diverticula  Descending Colon	Normal  Splenic Flexure	Normal  Transverse Colon	Scattered diverticula  Hepatic Flexure	Normal  Ascending Colon	 Normal  Cecum	                 Normal  Ileo-cecal Valve	 Normal  Ileum 	                 Not intubated     EBL:0    Impression:     1. Rectal ulcer  2. Diverticulosis  3. Internal hemorrhoids    INTERVAL EVENTS:  This morning patient with downtrending Hgb. Ordered for 1 unit PRBC in setting of anemia. Dr. Yan and Dr. Diez consulted. Per Dr. Yan and Dr. Diez, patient okay to begin full AC with heparin for DVT in RLE.       Vital Signs Last 24 Hrs  T(C): 36.5 (05 Jun 2021 11:08), Max: 36.5 (05 Jun 2021 11:08)  T(F): 97.7 (05 Jun 2021 11:08), Max: 97.7 (05 Jun 2021 11:08)  HR: 67 (05 Jun 2021 11:08) (67 - 85)  BP: 123/71 (05 Jun 2021 11:08) (83/48 - 123/71)  BP(mean): --  RR: 18 (05 Jun 2021 11:08) (18 - 19)  SpO2: 98% (05 Jun 2021 11:08) (98% - 100%)    Labs:                         7.4    6.07  )-----------( 239      ( 05 Jun 2021 08:23 )             22.4   06-05    140  |  112<H>  |  9   ----------------------------<  134<H>  3.7   |  24  |  0.52    Ca    8.4      05 Jun 2021 08:23    TPro  4.5<L>  /  Alb  2.1<L>  /  TBili  0.5  /  DBili  x   /  AST  15  /  ALT  <6<L>  /  AlkPhos  74  06-05    PT/INR - ( 05 Jun 2021 08:23 )   PT: 13.5 sec;   INR: 1.14 ratio         PTT - ( 04 Jun 2021 06:50 )  PTT:25.8 sec      Imaging:  u;< from: US Duplex Venous Lower Ext Complete, Bilateral (06.01.21 @ 14:06) >  EXAM:  US DPLX LWR EXT VEINS COMPL BI                        PROCEDURE DATE:  06/01/2021    INTERPRETATION:  CLINICAL INFORMATION: History of knee surgery  COMPARISON: None available.  TECHNIQUE: Duplex sonography of the BILATERAL LOWER extremity veins with color and spectral Doppler, with and without compression.  FINDINGS:  RIGHT:  Normal compressibility of the RIGHT common femoral vein. Noncompressible thrombi are noted in the peroneal vein, popliteal vein and lower femoralvein, compatible with deep vein thrombosis.  LEFT: The patient refused examination of the left leg.  IMPRESSION:  Deep vein thrombosis in the right leg as above.  Dr. Arellano is aware.  The patient refused examination of the left leg.      MEDICATIONS  (STANDING):  carbidopa/levodopa/entacapone 12.5/50/200 1 Tablet(s) Oral three times a day  dextrose 5%. 1000 milliLiter(s) (60 mL/Hr) IV Continuous <Continuous>  enalapril 20 milliGRAM(s) Oral two times a day  heparin   Injectable 2000 Unit(s) IV Push once  heparin  Infusion 700 Unit(s)/Hr (7 mL/Hr) IV Continuous <Continuous>  insulin lispro (ADMELOG) corrective regimen sliding scale   SubCutaneous Before meals and at bedtime  oxybutynin XL 5 milliGRAM(s) Oral daily  pantoprazole  Injectable 40 milliGRAM(s) IV Push two times a day  rasagiline Tablet 1 milliGRAM(s) Oral daily  senna 2 Tablet(s) Oral at bedtime    MEDICATIONS  (PRN):  acetaminophen   Tablet .. 650 milliGRAM(s) Oral every 6 hours PRN Temp greater or equal to 38C (100.4F), Moderate Pain (4 - 6)  ALBUTerol    90 MICROgram(s) HFA Inhaler 2 Puff(s) Inhalation every 6 hours PRN Shortness of Breath and/or Wheezing  ondansetron Injectable 4 milliGRAM(s) IV Push every 6 hours PRN Nausea and/or Vomiting  polyethylene glycol 3350 17 Gram(s) Oral daily PRN Constipation    A/P:  Pt is a 75 year old female from [place], with PMH of Parkinson's, COPD, diverticulosis, HTN, DM and HLD, who presented to the ED due to rectal bleed, s/p colonoscopy, c/b RLE DVT    Plan:    1. Continue blood transfusion as ordered this morning;              2. Repeat CBC/aptt/pt/inr after transfusion             3. Start heparin, patient specific due to low weight with bolus of 2000units of IVP Heparin, followed by Heparin infusion of 7ml/hr             4. Repeat aPTT every 6 hours to maintain goal range of 55-60 seconds; Provider specific heparin dose adjustment as nomogram not applied due to patient                specific needs; Covering team to continue monitoring labs to maintain goal (Per Dr. Yan/Dr. Diez)             3. Discussed dosing, and plan with Dr. Yan and Dr. Diez, who agree with above.             - Remainder of plan per attending note from today.

## 2021-06-05 NOTE — PROGRESS NOTE ADULT - ASSESSMENT
75 year old female from [place], with PMH of Parkinson's, COPD, diverticulosis, HTN, DM and HLD, who presented to the ED due to rectal bleed. PT was admitted for medicine for suspected GI bleed. CT abdomen showing rectal wall thickening, colonic diverticulosis without diverticulitis, right adnexal mass and DVT noted in LLE (left common femoral, femoral and deep femoral veins). US Duplex Wil. Lower Ext with DVT in the right leg (was unable to examine L leg as patient refused).   GI Dr. Galvez consulted and pt was recommended for colonoscopy. Colonoscopy was done on 6/2 however it was incomplete due to poor bowel prep (findings: large internal hemorrhoids and large amount of stool and blood clots). On 6/3 hg trending down (7.4) that pt was transfused 1 PRBC. NGT was attempted for bowel prep but pt became agitated and kept pulling the NGT. Pt received water enema prior to scheduled colonoscopy this morning however she started having bright red stool with some clots.   Pt is not a candidate for AC given signs of GIB. IR was consulted for IVC filter but informed from IR that based on imaging pt is not a candidate for IVF filter with IR. Vascular consult recalled and vascular is following patient.    Hem/on Dr Yan recommended transvaginal ultrasound for adnexal mass. Will decide when to obtain transvaginal u/s after colonoscopy is done per Dr Diez

## 2021-06-06 LAB
ALBUMIN SERPL ELPH-MCNC: 2.1 G/DL — LOW (ref 3.5–5)
ALP SERPL-CCNC: 82 U/L — SIGNIFICANT CHANGE UP (ref 40–120)
ALT FLD-CCNC: 8 U/L DA — LOW (ref 10–60)
ANION GAP SERPL CALC-SCNC: 5 MMOL/L — SIGNIFICANT CHANGE UP (ref 5–17)
APTT BLD: 101.2 SEC — HIGH (ref 27.5–35.5)
APTT BLD: 145.6 SEC — CRITICAL HIGH (ref 27.5–35.5)
APTT BLD: 74.3 SEC — HIGH (ref 27.5–35.5)
AST SERPL-CCNC: 18 U/L — SIGNIFICANT CHANGE UP (ref 10–40)
BILIRUB SERPL-MCNC: 0.6 MG/DL — SIGNIFICANT CHANGE UP (ref 0.2–1.2)
BUN SERPL-MCNC: 6 MG/DL — LOW (ref 7–18)
CALCIUM SERPL-MCNC: 8.7 MG/DL — SIGNIFICANT CHANGE UP (ref 8.4–10.5)
CHLORIDE SERPL-SCNC: 112 MMOL/L — HIGH (ref 96–108)
CO2 SERPL-SCNC: 24 MMOL/L — SIGNIFICANT CHANGE UP (ref 22–31)
CREAT SERPL-MCNC: 0.58 MG/DL — SIGNIFICANT CHANGE UP (ref 0.5–1.3)
GLUCOSE BLDC GLUCOMTR-MCNC: 125 MG/DL — HIGH (ref 70–99)
GLUCOSE BLDC GLUCOMTR-MCNC: 140 MG/DL — HIGH (ref 70–99)
GLUCOSE BLDC GLUCOMTR-MCNC: 184 MG/DL — HIGH (ref 70–99)
GLUCOSE BLDC GLUCOMTR-MCNC: 90 MG/DL — SIGNIFICANT CHANGE UP (ref 70–99)
GLUCOSE SERPL-MCNC: 103 MG/DL — HIGH (ref 70–99)
HCT VFR BLD CALC: 28.6 % — LOW (ref 34.5–45)
HCT VFR BLD CALC: 28.8 % — LOW (ref 34.5–45)
HGB BLD-MCNC: 9.5 G/DL — LOW (ref 11.5–15.5)
HGB BLD-MCNC: 9.6 G/DL — LOW (ref 11.5–15.5)
INR BLD: 1.1 RATIO — SIGNIFICANT CHANGE UP (ref 0.88–1.16)
MCHC RBC-ENTMCNC: 28.2 PG — SIGNIFICANT CHANGE UP (ref 27–34)
MCHC RBC-ENTMCNC: 28.4 PG — SIGNIFICANT CHANGE UP (ref 27–34)
MCHC RBC-ENTMCNC: 33.2 GM/DL — SIGNIFICANT CHANGE UP (ref 32–36)
MCHC RBC-ENTMCNC: 33.3 GM/DL — SIGNIFICANT CHANGE UP (ref 32–36)
MCV RBC AUTO: 84.9 FL — SIGNIFICANT CHANGE UP (ref 80–100)
MCV RBC AUTO: 85.2 FL — SIGNIFICANT CHANGE UP (ref 80–100)
NRBC # BLD: 0 /100 WBCS — SIGNIFICANT CHANGE UP (ref 0–0)
NRBC # BLD: 0 /100 WBCS — SIGNIFICANT CHANGE UP (ref 0–0)
PLATELET # BLD AUTO: 247 K/UL — SIGNIFICANT CHANGE UP (ref 150–400)
PLATELET # BLD AUTO: 254 K/UL — SIGNIFICANT CHANGE UP (ref 150–400)
POTASSIUM SERPL-MCNC: 3.9 MMOL/L — SIGNIFICANT CHANGE UP (ref 3.5–5.3)
POTASSIUM SERPL-SCNC: 3.9 MMOL/L — SIGNIFICANT CHANGE UP (ref 3.5–5.3)
PROT SERPL-MCNC: 4.8 G/DL — LOW (ref 6–8.3)
PROTHROM AB SERPL-ACNC: 13 SEC — SIGNIFICANT CHANGE UP (ref 10.6–13.6)
RBC # BLD: 3.37 M/UL — LOW (ref 3.8–5.2)
RBC # BLD: 3.38 M/UL — LOW (ref 3.8–5.2)
RBC # FLD: 16.1 % — HIGH (ref 10.3–14.5)
RBC # FLD: 16.2 % — HIGH (ref 10.3–14.5)
SODIUM SERPL-SCNC: 141 MMOL/L — SIGNIFICANT CHANGE UP (ref 135–145)
WBC # BLD: 6.43 K/UL — SIGNIFICANT CHANGE UP (ref 3.8–10.5)
WBC # BLD: 7.28 K/UL — SIGNIFICANT CHANGE UP (ref 3.8–10.5)
WBC # FLD AUTO: 6.43 K/UL — SIGNIFICANT CHANGE UP (ref 3.8–10.5)
WBC # FLD AUTO: 7.28 K/UL — SIGNIFICANT CHANGE UP (ref 3.8–10.5)

## 2021-06-06 RX ORDER — HEPARIN SODIUM 5000 [USP'U]/ML
500 INJECTION INTRAVENOUS; SUBCUTANEOUS
Qty: 25000 | Refills: 0 | Status: DISCONTINUED | OUTPATIENT
Start: 2021-06-06 | End: 2021-06-07

## 2021-06-06 RX ORDER — HEPARIN SODIUM 5000 [USP'U]/ML
600 INJECTION INTRAVENOUS; SUBCUTANEOUS
Qty: 25000 | Refills: 0 | Status: DISCONTINUED | OUTPATIENT
Start: 2021-06-06 | End: 2021-06-06

## 2021-06-06 RX ADMIN — Medication 1: at 11:49

## 2021-06-06 RX ADMIN — SENNA PLUS 2 TABLET(S): 8.6 TABLET ORAL at 22:48

## 2021-06-06 RX ADMIN — CARBIDOPA, LEVODOPA, AND ENTACAPONE 1 TABLET(S): 50; 200; 200 TABLET, FILM COATED ORAL at 22:47

## 2021-06-06 RX ADMIN — HEPARIN SODIUM 500 UNIT(S)/HR: 5000 INJECTION INTRAVENOUS; SUBCUTANEOUS at 15:56

## 2021-06-06 RX ADMIN — CARBIDOPA, LEVODOPA, AND ENTACAPONE 1 TABLET(S): 50; 200; 200 TABLET, FILM COATED ORAL at 13:02

## 2021-06-06 RX ADMIN — Medication 5 MILLIGRAM(S): at 11:19

## 2021-06-06 RX ADMIN — HEPARIN SODIUM 5 UNIT(S)/HR: 5000 INJECTION INTRAVENOUS; SUBCUTANEOUS at 18:02

## 2021-06-06 RX ADMIN — CARBIDOPA, LEVODOPA, AND ENTACAPONE 1 TABLET(S): 50; 200; 200 TABLET, FILM COATED ORAL at 05:53

## 2021-06-06 RX ADMIN — Medication 20 MILLIGRAM(S): at 17:13

## 2021-06-06 RX ADMIN — PANTOPRAZOLE SODIUM 40 MILLIGRAM(S): 20 TABLET, DELAYED RELEASE ORAL at 17:13

## 2021-06-06 RX ADMIN — HEPARIN SODIUM 600 UNIT(S)/HR: 5000 INJECTION INTRAVENOUS; SUBCUTANEOUS at 09:31

## 2021-06-06 RX ADMIN — RASAGILINE 1 MILLIGRAM(S): 0.5 TABLET ORAL at 11:19

## 2021-06-06 RX ADMIN — HEPARIN SODIUM 600 UNIT(S)/HR: 5000 INJECTION INTRAVENOUS; SUBCUTANEOUS at 01:16

## 2021-06-06 RX ADMIN — PANTOPRAZOLE SODIUM 40 MILLIGRAM(S): 20 TABLET, DELAYED RELEASE ORAL at 05:53

## 2021-06-06 RX ADMIN — Medication 20 MILLIGRAM(S): at 05:54

## 2021-06-06 NOTE — PROGRESS NOTE ADULT - ASSESSMENT
1. Rectal bleeding stopped  2. Anemia  3. S/p Colonoscopy  4. Rectal ulcer  5. Diverticulosis  6. Large hemorrhoids  7. No evidence of acute GI bleeding at present time    Suggestions:    1. Monitor H/H  2. Transfuse PRBC as needed  3. Avoid NSAID  4. Protonix 40mg daily  5. GYN evaluation  7. DVT prophylaxis

## 2021-06-06 NOTE — PROGRESS NOTE ADULT - SUBJECTIVE AND OBJECTIVE BOX
[   ] ICU                                          [   ] CCU                                      [  X ] Medical Floor    Patient is a 75 year old with rectal bleeding. GI consulted to evaluate.      Patient is a 75 year old female from [place], with past medical history significant for Parkinson's, COPD, diverticulosis, HTN, DM and HLD, who presented to the ED due to rectal bleed. History is limited from patient due to her being a poor historian. As per ED provider note, patient was sent to the ED for concern for vaginal bleed. In ED, patient is noted to have rectal bleed instead of vaginal bleed. Patient is confused unable to provide any history. No abdominal pain, nausea, vomiting, hematemesis, melena, fever, chills, chest pain, SOB, cough, hematuria, dysuria or diarrhea reported.       Today patient appears comfortable with no new complaints. No abdominal pain, N/V, hematemesis, hematochezia, melena, fever, chills, chest pain, SOB, cough or diarrhea reported.    PAIN MANAGEMENT:  Pain Scale:                0/10  Pain Location:      Prior Colonoscopy: Unknown    PAST MEDICAL HISTORY  Parkinson disease  COPD  Diverticulosis  HTN (hypertension)  Diabetes mellitus  HLD (hyperlipidemia)      PAST SURGICAL HISTORY  No significant past surgical history      Allergies    No Known Allergies    Intolerances  None        SOCIAL HISTORY  Advanced Directives:       [ X ] Full Code       [  ] DNR  Marital Status:         [  ] M      [ X ] S      [  ] D       [  ] W  Children:       [ X ] Yes      [  ] No  Occupation:        [  ] Employed       [ X ] Unemployed       [  ] Retired  Diet:       [ X ] Regular       [  ] PEG feeding          [  ] NG tube feeding  Drug Use:           [ X ] Patient denied          [  ] Yes  Alcohol:           [ X ] No             [  ] Yes (socially)         [  ] Yes (chronic)  Tobacco:           [  ] Yes           [ X ] No    FAMILY HISTORY  [ X ] Heart Disease            [ X ] Diabetes             [ X ] HTN             [  ] Colon Cancer             [  ] Stomach Cancer              [  ] Pancreatic Cancer      VITALS  Vital Signs Last 24 Hrs  T(C): 36.9 (06 Jun 2021 05:09), Max: 36.9 (06 Jun 2021 05:09)  T(F): 98.4 (06 Jun 2021 05:09), Max: 98.4 (06 Jun 2021 05:09)  HR: 52 (06 Jun 2021 05:09) (52 - 72)  BP: 156/84 (06 Jun 2021 05:09) (121/70 - 156/84)   RR: 18 (06 Jun 2021 05:09) (18 - 18)  SpO2: 95% (06 Jun 2021 05:09) (95% - 99%)       MEDICATIONS  (STANDING):  carbidopa/levodopa/entacapone 12.5/50/200 1 Tablet(s) Oral three times a day  dextrose 5%. 1000 milliLiter(s) (60 mL/Hr) IV Continuous <Continuous>  enalapril 20 milliGRAM(s) Oral two times a day  heparin  Infusion. 600 Unit(s)/Hr (6 mL/Hr) IV Continuous <Continuous>  insulin lispro (ADMELOG) corrective regimen sliding scale   SubCutaneous Before meals and at bedtime  oxybutynin XL 5 milliGRAM(s) Oral daily  pantoprazole  Injectable 40 milliGRAM(s) IV Push two times a day  rasagiline Tablet 1 milliGRAM(s) Oral daily  senna 2 Tablet(s) Oral at bedtime    MEDICATIONS  (PRN):  acetaminophen   Tablet .. 650 milliGRAM(s) Oral every 6 hours PRN Temp greater or equal to 38C (100.4F), Moderate Pain (4 - 6)  ALBUTerol    90 MICROgram(s) HFA Inhaler 2 Puff(s) Inhalation every 6 hours PRN Shortness of Breath and/or Wheezing  ondansetron Injectable 4 milliGRAM(s) IV Push every 6 hours PRN Nausea and/or Vomiting  polyethylene glycol 3350 17 Gram(s) Oral daily PRN Constipation                            9.6    6.43  )-----------( 254      ( 06 Jun 2021 06:07 )             28.8       06-06    141  |  112<H>  |  6<L>  ----------------------------<  103<H>  3.9   |  24  |  0.58    Ca    8.7      06 Jun 2021 06:07    TPro  4.8<L>  /  Alb  2.1<L>  /  TBili  0.6  /  DBili  x   /  AST  18  /  ALT  8<L>  /  AlkPhos  82  06-06      PT/INR - ( 06 Jun 2021 06:07 )   PT: 13.0 sec;   INR: 1.10 ratio         PTT - ( 06 Jun 2021 06:07 )  PTT:145.6 sec

## 2021-06-06 NOTE — PROGRESS NOTE ADULT - SUBJECTIVE AND OBJECTIVE BOX
colonoscopy showed hemorrhoid and rectal ulcer, cauterized    heparin started  H/H stable    MEDICATIONS  (STANDING):  carbidopa/levodopa/entacapone 12.5/50/200 1 Tablet(s) Oral three times a day  dextrose 5%. 1000 milliLiter(s) (60 mL/Hr) IV Continuous <Continuous>  enalapril 20 milliGRAM(s) Oral two times a day  heparin  Infusion. 600 Unit(s)/Hr (6 mL/Hr) IV Continuous <Continuous>  insulin lispro (ADMELOG) corrective regimen sliding scale   SubCutaneous Before meals and at bedtime  oxybutynin XL 5 milliGRAM(s) Oral daily  pantoprazole  Injectable 40 milliGRAM(s) IV Push two times a day  rasagiline Tablet 1 milliGRAM(s) Oral daily  senna 2 Tablet(s) Oral at bedtime    MEDICATIONS  (PRN):  acetaminophen   Tablet .. 650 milliGRAM(s) Oral every 6 hours PRN Temp greater or equal to 38C (100.4F), Moderate Pain (4 - 6)  ALBUTerol    90 MICROgram(s) HFA Inhaler 2 Puff(s) Inhalation every 6 hours PRN Shortness of Breath and/or Wheezing  ondansetron Injectable 4 milliGRAM(s) IV Push every 6 hours PRN Nausea and/or Vomiting  polyethylene glycol 3350 17 Gram(s) Oral daily PRN Constipation      Allergies    No Known Allergies    Intolerances        Vital Signs Last 24 Hrs  T(C): 36.9 (06 Jun 2021 05:09), Max: 36.9 (06 Jun 2021 05:09)  T(F): 98.4 (06 Jun 2021 05:09), Max: 98.4 (06 Jun 2021 05:09)  HR: 52 (06 Jun 2021 05:09) (52 - 72)  BP: 156/84 (06 Jun 2021 05:09) (121/70 - 156/84)  BP(mean): --  RR: 18 (06 Jun 2021 05:09) (18 - 18)  SpO2: 95% (06 Jun 2021 05:09) (95% - 99%)    PHYSICAL EXAM  General: adult in NAD  HEENT: clear oropharynx, anicteric sclera, pink conjunctiva  Neck: supple  CV: normal S1/S2 with no murmur rubs or gallops  Lungs: positive air movement b/l ant lungs,clear to auscultation, no wheezes, no rales  Abdomen: soft non-tender non-distended, no hepatosplenomegaly  Ext: no clubbing cyanosis or edema  Skin: no rashes and no petechiae  Neuro: alert and oriented X 4, no focal deficits  LABS:                          9.6    6.43  )-----------( 254      ( 06 Jun 2021 06:07 )             28.8         Mean Cell Volume : 85.2 fl  Mean Cell Hemoglobin : 28.4 pg  Mean Cell Hemoglobin Concentration : 33.3 gm/dL  Auto Neutrophil # : x  Auto Lymphocyte # : x  Auto Monocyte # : x  Auto Eosinophil # : x  Auto Basophil # : x  Auto Neutrophil % : x  Auto Lymphocyte % : x  Auto Monocyte % : x  Auto Eosinophil % : x  Auto Basophil % : x    Serial CBC  Hematocrit 28.8  Hemoglobin 9.6  Plat 254  RBC 3.38  WBC 6.43  Serial CBC  Hematocrit 26.9  Hemoglobin 8.9  Plat 223  RBC 3.16  WBC 6.81  Serial CBC  Hematocrit 22.4  Hemoglobin 7.4  Plat 239  RBC 2.59  WBC 6.07  Serial CBC  Hematocrit 27.4  Hemoglobin 8.6  Plat 232  RBC 3.14  WBC 6.57  Serial CBC  Hematocrit 28.8  Hemoglobin 9.2  Plat 238  RBC 3.31  WBC 6.37  Serial CBC  Hematocrit 23.3  Hemoglobin 7.4  Plat 228  RBC 2.62  WBC 5.11    06-06    141  |  112<H>  |  6<L>  ----------------------------<  103<H>  3.9   |  24  |  0.58    Ca    8.7      06 Jun 2021 06:07    TPro  4.8<L>  /  Alb  2.1<L>  /  TBili  0.6  /  DBili  x   /  AST  18  /  ALT  8<L>  /  AlkPhos  82  06-06      PT/INR - ( 06 Jun 2021 06:07 )   PT: 13.0 sec;   INR: 1.10 ratio         PTT - ( 06 Jun 2021 09:10 )  PTT:74.3 sec    Ferritin, Serum: 451 ng/mL (06-02 @ 09:41)  Folate, Serum: 8.1 ng/mL (06-02 @ 09:41)  Vitamin B12, Serum: 445 pg/mL (06-02 @ 09:41)  Iron - Total Binding Capacity.: 169 ug/dL (06-02 @ 06:23)            BLOOD SMEAR INTERPRETATION:       RADIOLOGY & ADDITIONAL STUDIES:

## 2021-06-06 NOTE — PROGRESS NOTE ADULT - ASSESSMENT
75 year old female from [place], with PMH of Parkinson's, COPD, diverticulosis, HTN, DM and HLD, who presented to the ED due to rectal bleed. PT was admitted for medicine for suspected GI bleed. CT abdomen showing rectal wall thickening, colonic diverticulosis without diverticulitis, right adnexal mass and DVT noted in LLE (left common femoral, femoral and deep femoral veins). US Duplex Wil. Lower Ext with DVT in the right leg (was unable to examine L leg as patient refused).   GI Dr. Galvez consulted and pt was recommended for colonoscopy. Colonoscopy was done on 6/2 however it was incomplete due to poor bowel prep (findings: large internal hemorrhoids and large amount of stool and blood clots). On 6/3 hg trending down (7.4) that pt was transfused 1 PRBC. NGT was attempted for bowel prep but pt became agitated and kept pulling the NGT. Pt received water enema prior to scheduled colonoscopy this morning however she started having bright red stool with some clots.   Pt is not a candidate for AC given signs of GIB. IR was consulted for IVC filter but informed from IR that based on imaging pt is not a candidate for IVF filter with IR. Vascular consult recalled and vascular is following patient.    Hem/on Dr Yan recommended transvaginal ultrasound for adnexal mass. Will decide when to obtain transvaginal u/s after colonoscopy is done per Dr Diez

## 2021-06-06 NOTE — CHART NOTE - NSCHARTNOTEFT_GEN_A_CORE
EVENT: Elevated aPTT: Patient admitted with GIB requiring transfusion w/ course complicated by RLE DVT requiring full anticoagulation on heparin gtt. Due to bleeding patient is not on nomogram and has patient specific aPTT as per heme/onc. Aptt elevated again this AM as per Dr. Yan will hold heparin for 2 hours and resume at same rate 600 units/hr. CBC WNL and no active signs of bleeding noted    OBJECTIVE:  Vital Signs Last 24 Hrs  T(C): 36.9 (06 Jun 2021 05:09), Max: 36.9 (06 Jun 2021 05:09)  T(F): 98.4 (06 Jun 2021 05:09), Max: 98.4 (06 Jun 2021 05:09)  HR: 52 (06 Jun 2021 05:09) (52 - 72)  BP: 156/84 (06 Jun 2021 05:09) (121/70 - 156/84)  BP(mean): --  RR: 18 (06 Jun 2021 05:09) (18 - 18)  SpO2: 95% (06 Jun 2021 05:09) (95% - 99%)    FOCUSED PHYSICAL EXAM:    LABS:                        9.6    6.43  )-----------( 254      ( 06 Jun 2021 06:07 )             28.8     06-06    141  |  112<H>  |  6<L>  ----------------------------<  103<H>  3.9   |  24  |  0.58    Ca    8.7      06 Jun 2021 06:07    TPro  4.8<L>  /  Alb  2.1<L>  /  TBili  0.6  /  DBili  x   /  AST  18  /  ALT  8<L>  /  AlkPhos  82  06-06      EKG:   IMGAGING:    ASSESSMENT:  HPI:  Patient is a 75 year old female from [place], with PMH of Parkinson's, COPD, diverticulosis, HTN, DM and HLD, who presented to the ED due to rectal bleed. History is limited from patient due to her being a poor historian. As per ED provider note, patient was sent to the ED for concern for vaginal bleed. In ED, patient is noted to have rectal bleed instead of vaginal bleed. Spoke to daughter, Batool Johnson ([phone]) for further history. Daughter states she was told that patient was sent to ED for concern for vaginal bleed. Does not have any other concerns or complaints at this time.  (01 Jun 2021 12:06)      PLAN:   -Resume Heparin gtt @9:00AM @ 600 units/hr    FOLLOW UP / RESULT:

## 2021-06-06 NOTE — PROGRESS NOTE ADULT - ASSESSMENT
· Assessment	  75 year old lady presented with rectal bleeding.  CT showed rectal thickening, adnexal mass, and DVT.    1. rectal bleeding. will do sigmoidoscopy  please give her enema  discussed with Dr. Leonor herr prep  large hemorrhoid, and rectl ulcer  cauterized    2. adnexal mass  will do transvaginal ultrasound  check ca 125    3. DVT  not on AC yet due to rectal bleedingno bleeding today so far  for sigmoidoscopy  she has rectal bleeding again  need IVC filter  IR said it can not be done  but right femoral vein is clear  vascular to follow  rectal ulcer cauterized  will begin heparin with low bolus  PTT went to 180  hold heparin for 2 hours then restart at 600 u/h

## 2021-06-06 NOTE — PROGRESS NOTE ADULT - SUBJECTIVE AND OBJECTIVE BOX
Patient is a 75y old  Female who presents with a chief complaint of rectal bleed       INTERVAL HPI/OVERNIGHT EVENTS:   s no c/o offered  MEDICATIONS  (STANDING):  carbidopa/levodopa/entacapone 12.5/50/200 1 Tablet(s) Oral three times a day  dextrose 5%. 1000 milliLiter(s) (60 mL/Hr) IV Continuous <Continuous>  enalapril 20 milliGRAM(s) Oral two times a day  heparin  Infusion 500 Unit(s)/Hr (5 mL/Hr) IV Continuous <Continuous>  insulin lispro (ADMELOG) corrective regimen sliding scale   SubCutaneous Before meals and at bedtime  oxybutynin XL 5 milliGRAM(s) Oral daily  pantoprazole  Injectable 40 milliGRAM(s) IV Push two times a day  rasagiline Tablet 1 milliGRAM(s) Oral daily  senna 2 Tablet(s) Oral at bedtime    MEDICATIONS  (PRN):  acetaminophen   Tablet .. 650 milliGRAM(s) Oral every 6 hours PRN Temp greater or equal to 38C (100.4F), Moderate Pain (4 - 6)  ALBUTerol    90 MICROgram(s) HFA Inhaler 2 Puff(s) Inhalation every 6 hours PRN Shortness of Breath and/or Wheezing  ondansetron Injectable 4 milliGRAM(s) IV Push every 6 hours PRN Nausea and/or Vomiting  polyethylene glycol 3350 17 Gram(s) Oral daily PRN Constipation      __________________________________________________  REVIEW OF SYSTEMS:  Unable to obtain due to patient's mental status (confused)    Vital Signs Last 24 Hrs  T(C): 36.4 (06 Jun 2021 12:17), Max: 36.9 (06 Jun 2021 05:09)  T(F): 97.5 (06 Jun 2021 12:17), Max: 98.4 (06 Jun 2021 05:09)  HR: 66 (06 Jun 2021 12:17) (52 - 72)  BP: 116/64 (06 Jun 2021 12:17) (116/64 - 156/84)  BP(mean): --  RR: 18 (06 Jun 2021 12:17) (18 - 18)  SpO2: 100% (06 Jun 2021 12:17) (95% - 100%)    ________________________________________________  PHYSICAL EXAM:  GENERAL:  frail, b/l temporal wasting, lethargic   HEENT: Normocephalic;  conjunctivae and sclerae clear; moist mucous membranes;   NECK : supple  CHEST/LUNG: Clear to auscultation bilaterally with good air entry   HEART: S1 S2  regular; no murmurs, gallops or rubs  ABDOMEN: Soft, Nontender, Nondistended; Bowel sounds present  EXTREMITIES: +2 LE edema L>R   SKIN: warm and dry; no rash  NERVOUS SYSTEM:  Awake and alert; oriented to self   _________________________________________________    LABS:                        9.5    7.28  )-----------( 247      ( 06 Jun 2021 15:15 )             28.6     06-06    141  |  112<H>  |  6<L>  ----------------------------<  103<H>  3.9   |  24  |  0.58    Ca    8.7      06 Jun 2021 06:07    TPro  4.8<L>  /  Alb  2.1<L>  /  TBili  0.6  /  DBili  x   /  AST  18  /  ALT  8<L>  /  AlkPhos  82  06-06    PT/INR - ( 06 Jun 2021 06:07 )   PT: 13.0 sec;   INR: 1.10 ratio         PTT - ( 06 Jun 2021 15:15 )  PTT:101.2 sec                      LABS:                        7.4    6.07  )-----------( 239      ( 05 Jun 2021 08:23 )             22.4     06-05    140  |  112<H>  |  9   ----------------------------<  134<H>  3.7   |  24  |  0.52    Ca    8.4      05 Jun 2021 08:23    TPro  4.5<L>  /  Alb  2.1<L>  /  TBili  0.5  /  DBili  x   /  AST  15  /  ALT  <6<L>  /  AlkPhos  74  06-05    PT/INR - ( 05 Jun 2021 08:23 )   PT: 13.5 sec;   INR: 1.14 ratio         PTT - ( 04 Jun 2021 06:50 )  PTT:25.8 sec    colonoscopy no bleed , hemorrhoid, rectal ulcer                   LABS:                        8.6    6.57  )-----------( 232      ( 04 Jun 2021 11:10 )             27.4     06-04    147<H>  |  116<H>  |  9   ----------------------------<  97  3.5   |  25  |  0.59    Ca    9.1      04 Jun 2021 06:50    TPro  5.0<L>  /  Alb  2.2<L>  /  TBili  0.5  /  DBili  x   /  AST  22  /  ALT  9<L>  /  AlkPhos  86  06-04    PT/INR - ( 04 Jun 2021 06:50 )   PT: 12.8 sec;   INR: 1.08 ratio         PTT - ( 04 Jun 2021 06:50 )  PTT:25.8 sec    CAPILLARY BLOOD GLUCOSE      POCT Blood Glucose.: 102 mg/dL (04 Jun 2021 11:52)  POCT Blood Glucose.: 115 mg/dL (04 Jun 2021 07:55)  POCT Blood Glucose.: 85 mg/dL (03 Jun 2021 21:09)  POCT Blood Glucose.: 162 mg/dL (03 Jun 2021 17:08)        RADIOLOGY & ADDITIONAL TESTS:      Imaging  Reviewed:  YES    Consultant(s) Notes Reviewed:   YES      Plan of care was discussed with patient and /or primary care giver; all questions and concerns were addressed

## 2021-06-06 NOTE — CHART NOTE - NSCHARTNOTEFT_GEN_A_CORE
Patient's PTT returned as below:    PTT - ( 06 Jun 2021 15:15 )  PTT:101.2 sec    Discussed case with Dr. Yan, titration of drip, patient specific per recommendations    -Stopped heparin drip for 1 hour  -Restarted drip after 1 hour pause  -Continue at   heparin  Infusion 500 Unit(s)/Hr (5 mL/Hr) IV Continuous <Continuous>  -Next PTT at 11:59pm June 6,2021 as ordered, please follow up results and adjust as necessary'    Per Dr. Yan, call with any questions Patient's afternoon PTT returned as below:    PTT:101.2 sec    Discussed case with Dr. Yan, titration of drip, patient specific per recommendations    -Stopped heparin drip for 1 hour  -Restarted drip after 1 hour pause  -Continue at   heparin  Infusion 500 Unit(s)/Hr (5 mL/Hr) IV Continuous <Continuous>  -Next PTT at 11:59pm June 6,2021 as ordered, please follow up results and adjust as necessary  -This is a patient specific titration order for RNs. Please reorder heparin when adjusting rate and order aPTT 6 hour post adjustment of heparin rates  Per Dr. Yan, call with any questions

## 2021-06-07 LAB
ALBUMIN SERPL ELPH-MCNC: 2.1 G/DL — LOW (ref 3.5–5)
ALP SERPL-CCNC: 88 U/L — SIGNIFICANT CHANGE UP (ref 40–120)
ALT FLD-CCNC: 7 U/L DA — LOW (ref 10–60)
ANION GAP SERPL CALC-SCNC: 7 MMOL/L — SIGNIFICANT CHANGE UP (ref 5–17)
APTT BLD: 54.3 SEC — HIGH (ref 27.5–35.5)
APTT BLD: 55.4 SEC — HIGH (ref 27.5–35.5)
APTT BLD: 69.7 SEC — HIGH (ref 27.5–35.5)
APTT BLD: 81.3 SEC — HIGH (ref 27.5–35.5)
AST SERPL-CCNC: 17 U/L — SIGNIFICANT CHANGE UP (ref 10–40)
BILIRUB SERPL-MCNC: 0.5 MG/DL — SIGNIFICANT CHANGE UP (ref 0.2–1.2)
BUN SERPL-MCNC: 5 MG/DL — LOW (ref 7–18)
CALCIUM SERPL-MCNC: 8.7 MG/DL — SIGNIFICANT CHANGE UP (ref 8.4–10.5)
CHLORIDE SERPL-SCNC: 109 MMOL/L — HIGH (ref 96–108)
CO2 SERPL-SCNC: 24 MMOL/L — SIGNIFICANT CHANGE UP (ref 22–31)
CREAT SERPL-MCNC: 0.65 MG/DL — SIGNIFICANT CHANGE UP (ref 0.5–1.3)
GLUCOSE BLDC GLUCOMTR-MCNC: 138 MG/DL — HIGH (ref 70–99)
GLUCOSE BLDC GLUCOMTR-MCNC: 139 MG/DL — HIGH (ref 70–99)
GLUCOSE BLDC GLUCOMTR-MCNC: 140 MG/DL — HIGH (ref 70–99)
GLUCOSE BLDC GLUCOMTR-MCNC: 140 MG/DL — HIGH (ref 70–99)
GLUCOSE SERPL-MCNC: 111 MG/DL — HIGH (ref 70–99)
HCT VFR BLD CALC: 27.7 % — LOW (ref 34.5–45)
HGB BLD-MCNC: 9.3 G/DL — LOW (ref 11.5–15.5)
MCHC RBC-ENTMCNC: 28.5 PG — SIGNIFICANT CHANGE UP (ref 27–34)
MCHC RBC-ENTMCNC: 33.6 GM/DL — SIGNIFICANT CHANGE UP (ref 32–36)
MCV RBC AUTO: 85 FL — SIGNIFICANT CHANGE UP (ref 80–100)
NRBC # BLD: 0 /100 WBCS — SIGNIFICANT CHANGE UP (ref 0–0)
PLATELET # BLD AUTO: 260 K/UL — SIGNIFICANT CHANGE UP (ref 150–400)
POTASSIUM SERPL-MCNC: 3.7 MMOL/L — SIGNIFICANT CHANGE UP (ref 3.5–5.3)
POTASSIUM SERPL-SCNC: 3.7 MMOL/L — SIGNIFICANT CHANGE UP (ref 3.5–5.3)
PROT SERPL-MCNC: 5 G/DL — LOW (ref 6–8.3)
RBC # BLD: 3.26 M/UL — LOW (ref 3.8–5.2)
RBC # FLD: 16.2 % — HIGH (ref 10.3–14.5)
SODIUM SERPL-SCNC: 140 MMOL/L — SIGNIFICANT CHANGE UP (ref 135–145)
WBC # BLD: 7.39 K/UL — SIGNIFICANT CHANGE UP (ref 3.8–10.5)
WBC # FLD AUTO: 7.39 K/UL — SIGNIFICANT CHANGE UP (ref 3.8–10.5)

## 2021-06-07 PROCEDURE — 99232 SBSQ HOSP IP/OBS MODERATE 35: CPT

## 2021-06-07 RX ORDER — HEPARIN SODIUM 5000 [USP'U]/ML
400 INJECTION INTRAVENOUS; SUBCUTANEOUS
Qty: 25000 | Refills: 0 | Status: DISCONTINUED | OUTPATIENT
Start: 2021-06-07 | End: 2021-06-08

## 2021-06-07 RX ADMIN — CARBIDOPA, LEVODOPA, AND ENTACAPONE 1 TABLET(S): 50; 200; 200 TABLET, FILM COATED ORAL at 21:05

## 2021-06-07 RX ADMIN — CARBIDOPA, LEVODOPA, AND ENTACAPONE 1 TABLET(S): 50; 200; 200 TABLET, FILM COATED ORAL at 06:19

## 2021-06-07 RX ADMIN — Medication 20 MILLIGRAM(S): at 17:28

## 2021-06-07 RX ADMIN — HEPARIN SODIUM 4 UNIT(S)/HR: 5000 INJECTION INTRAVENOUS; SUBCUTANEOUS at 09:45

## 2021-06-07 RX ADMIN — RASAGILINE 1 MILLIGRAM(S): 0.5 TABLET ORAL at 11:57

## 2021-06-07 RX ADMIN — Medication 5 MILLIGRAM(S): at 11:57

## 2021-06-07 RX ADMIN — HEPARIN SODIUM 5 UNIT(S)/HR: 5000 INJECTION INTRAVENOUS; SUBCUTANEOUS at 01:48

## 2021-06-07 RX ADMIN — SENNA PLUS 2 TABLET(S): 8.6 TABLET ORAL at 21:04

## 2021-06-07 RX ADMIN — CARBIDOPA, LEVODOPA, AND ENTACAPONE 1 TABLET(S): 50; 200; 200 TABLET, FILM COATED ORAL at 13:16

## 2021-06-07 RX ADMIN — PANTOPRAZOLE SODIUM 40 MILLIGRAM(S): 20 TABLET, DELAYED RELEASE ORAL at 17:28

## 2021-06-07 RX ADMIN — PANTOPRAZOLE SODIUM 40 MILLIGRAM(S): 20 TABLET, DELAYED RELEASE ORAL at 06:20

## 2021-06-07 RX ADMIN — Medication 20 MILLIGRAM(S): at 06:19

## 2021-06-07 NOTE — CONSULT NOTE ADULT - CONSULT REASON
Rectal bleeding
rectal bleeding and DVT
Bilateral LE DVT, needs IVC filter
DVT- IVC filter insertion

## 2021-06-07 NOTE — PROGRESS NOTE ADULT - ASSESSMENT
76 y/o female with b/l LE DVT, recent GI bleed, s/p colonoscopy with rectal ulcer cauterizaiton. H/H stable, on heparin drip, no signs of active bleeding. IR consulted for possible IVC filter placement.   -Continue with heparin drip if medically safe   -Monitor for s/s of bleeding  -Trend H/H, transfuse as needed  -F/u heme/onc recs   -Continue care as per primary team   -Will discuss with Dr. Cedeno  76 y/o female with b/l LE DVT, recent GI bleed, s/p colonoscopy with rectal ulcer cauterizaiton. H/H stable, on heparin drip, no signs of active bleeding.   -Continue with heparin drip if medically safe   -Monitor for s/s of bleeding  -Trend H/H, transfuse as needed  -F/u heme/onc recs   -Continue care as per primary team   -Will discuss with Dr. Cedeno  76 y/o female with b/l LE DVT, recent GI bleed, s/p colonoscopy with rectal ulcer cauterizaiton. H/H stable, on heparin drip, no signs of active bleeding.   -Continue with heparin drip if medically safe   -Monitor for s/s of bleeding  -Trend H/H, transfuse as needed  -F/u heme/onc recs   -Pt tolerating heparin drip at this time. In the event that the pt is unable to tolerate anticoagulation therapy, IR to place IVC filter. No acute vascular surgical intervention at this time, please reconsult as needed  -Continue care as per primary team   -Discussed with Dr. Cedeno who agrees

## 2021-06-07 NOTE — PROGRESS NOTE ADULT - SUBJECTIVE AND OBJECTIVE BOX
INTERVAL HPI/OVERNIGHT EVENTS:  Patient seen and examined at bedside.   Pt resting comfortably. No acute complaints. On heparin drip     MEDICATIONS  (STANDING):  carbidopa/levodopa/entacapone 12.5/50/200 1 Tablet(s) Oral three times a day  dextrose 5%. 1000 milliLiter(s) (60 mL/Hr) IV Continuous <Continuous>  enalapril 20 milliGRAM(s) Oral two times a day  heparin  Infusion 400 Unit(s)/Hr (4 mL/Hr) IV Continuous <Continuous>  insulin lispro (ADMELOG) corrective regimen sliding scale   SubCutaneous Before meals and at bedtime  oxybutynin XL 5 milliGRAM(s) Oral daily  pantoprazole  Injectable 40 milliGRAM(s) IV Push two times a day  rasagiline Tablet 1 milliGRAM(s) Oral daily  senna 2 Tablet(s) Oral at bedtime    MEDICATIONS  (PRN):  acetaminophen   Tablet .. 650 milliGRAM(s) Oral every 6 hours PRN Temp greater or equal to 38C (100.4F), Moderate Pain (4 - 6)  ALBUTerol    90 MICROgram(s) HFA Inhaler 2 Puff(s) Inhalation every 6 hours PRN Shortness of Breath and/or Wheezing  ondansetron Injectable 4 milliGRAM(s) IV Push every 6 hours PRN Nausea and/or Vomiting  polyethylene glycol 3350 17 Gram(s) Oral daily PRN Constipation      Vital Signs Last 24 Hrs  T(C): 37 (07 Jun 2021 13:52), Max: 37 (07 Jun 2021 13:52)  T(F): 98.6 (07 Jun 2021 13:52), Max: 98.6 (07 Jun 2021 13:52)  HR: 74 (07 Jun 2021 13:52) (71 - 77)  BP: 127/74 (07 Jun 2021 13:52) (127/74 - 148/80)  BP(mean): --  RR: 18 (07 Jun 2021 13:52) (18 - 18)  SpO2: 98% (07 Jun 2021 13:52) (98% - 100%)    Physical:  General: NAD.  Respirations: Unlabored   Abdomen: Soft nondistended, nontender.    I&O's Detail    07 Jun 2021 07:01  -  07 Jun 2021 14:21  --------------------------------------------------------  IN:  Total IN: 0 mL    OUT:    Voided (mL): 1200 mL  Total OUT: 1200 mL    Total NET: -1200 mL          LABS:                        9.3    7.39  )-----------( 260      ( 07 Jun 2021 07:55 )             27.7             06-07    140  |  109<H>  |  5<L>  ----------------------------<  111<H>  3.7   |  24  |  0.65    Ca    8.7      07 Jun 2021 07:55    TPro  5.0<L>  /  Alb  2.1<L>  /  TBili  0.5  /  DBili  x   /  AST  17  /  ALT  7<L>  /  AlkPhos  88  06-07

## 2021-06-07 NOTE — PROGRESS NOTE ADULT - ASSESSMENT
· Assessment	  75 year old lady presented with rectal bleeding.  CT showed rectal thickening, adnexal mass, and DVT.    1. rectal bleeding. will do sigmoidoscopy  please give her enema  discussed with Dr. Leonor herr prep  large hemorrhoid, and rectl ulcer  cauterized    2. adnexal mass  will do transvaginal ultrasound  check ca 125    3. DVT  not on AC yet due to rectal bleedingno bleeding today so far  for sigmoidoscopy  she has rectal bleeding again  need IVC filter  IR said it can not be done  but right femoral vein is clear  vascular to follow  rectal ulcer cauterized  will begin heparin with low bolus  PTT went to 180  hold heparin for 2 hours then restart at 600 u/h  PTT is still high  will hold for 1 h then start at 400u/hr  no bleeding seen at bedsheet

## 2021-06-07 NOTE — PROGRESS NOTE ADULT - SUBJECTIVE AND OBJECTIVE BOX
Patient is a 75y old  Female who presents with a chief complaint of rectal bleed       INTERVAL HPI/OVERNIGHT EVENTS:   s no c/o offered  MEDICATIONS  (STANDING):  carbidopa/levodopa/entacapone 12.5/50/200 1 Tablet(s) Oral three times a day  dextrose 5%. 1000 milliLiter(s) (60 mL/Hr) IV Continuous <Continuous>  enalapril 20 milliGRAM(s) Oral two times a day  heparin  Infusion 400 Unit(s)/Hr (4 mL/Hr) IV Continuous <Continuous>  insulin lispro (ADMELOG) corrective regimen sliding scale   SubCutaneous Before meals and at bedtime  oxybutynin XL 5 milliGRAM(s) Oral daily  pantoprazole  Injectable 40 milliGRAM(s) IV Push two times a day  rasagiline Tablet 1 milliGRAM(s) Oral daily  senna 2 Tablet(s) Oral at bedtime    MEDICATIONS  (PRN):  acetaminophen   Tablet .. 650 milliGRAM(s) Oral every 6 hours PRN Temp greater or equal to 38C (100.4F), Moderate Pain (4 - 6)  ALBUTerol    90 MICROgram(s) HFA Inhaler 2 Puff(s) Inhalation every 6 hours PRN Shortness of Breath and/or Wheezing  ondansetron Injectable 4 milliGRAM(s) IV Push every 6 hours PRN Nausea and/or Vomiting  polyethylene glycol 3350 17 Gram(s) Oral daily PRN Constipation    __________________________________________________  REVIEW OF SYSTEMS:  no c/o offered   more awake  Vital Signs Last 24 Hrs  T(C): 36.9 (07 Jun 2021 05:05), Max: 36.9 (07 Jun 2021 05:05)  T(F): 98.5 (07 Jun 2021 05:05), Max: 98.5 (07 Jun 2021 05:05)  HR: 72 (07 Jun 2021 05:05) (66 - 77)  BP: 128/79 (07 Jun 2021 05:05) (116/64 - 148/80)  BP(mean): --  RR: 18 (07 Jun 2021 05:05) (18 - 18)  SpO2: 98% (07 Jun 2021 05:05) (98% - 100%)    ________________________________________________  PHYSICAL EXAM:  GENERAL:  frail, b/l temporal wasting, lethargic   HEENT: Normocephalic;  conjunctivae and sclerae clear; moist mucous membranes;   NECK : supple  CHEST/LUNG: Clear to auscultation bilaterally with good air entry   HEART: S1 S2  regular; no murmurs, gallops or rubs  ABDOMEN: Soft, Nontender, Nondistended; Bowel sounds present  EXTREMITIES: +2 LE edema L>R   SKIN: warm and dry; no rash  NERVOUS SYSTEM:  Awake and alert; oriented to self   _________________________________________________    LABS:                        9.3    7.39  )-----------( 260      ( 07 Jun 2021 07:55 )             27.7     06-07    140  |  109<H>  |  5<L>  ----------------------------<  111<H>  3.7   |  24  |  0.65    Ca    8.7      07 Jun 2021 07:55    TPro  5.0<L>  /  Alb  2.1<L>  /  TBili  0.5  /  DBili  x   /  AST  17  /  ALT  7<L>  /  AlkPhos  88  06-07    PT/INR - ( 06 Jun 2021 06:07 )   PT: 13.0 sec;   INR: 1.10 ratio         PTT - ( 07 Jun 2021 07:55 )  PTT:81.3 sec                        LABS:                        9.5    7.28  )-----------( 247      ( 06 Jun 2021 15:15 )             28.6     06-06    141  |  112<H>  |  6<L>  ----------------------------<  103<H>  3.9   |  24  |  0.58    Ca    8.7      06 Jun 2021 06:07    TPro  4.8<L>  /  Alb  2.1<L>  /  TBili  0.6  /  DBili  x   /  AST  18  /  ALT  8<L>  /  AlkPhos  82  06-06    PT/INR - ( 06 Jun 2021 06:07 )   PT: 13.0 sec;   INR: 1.10 ratio         PTT - ( 06 Jun 2021 15:15 )  PTT:101.2 sec                      LABS:                        7.4    6.07  )-----------( 239      ( 05 Jun 2021 08:23 )             22.4     06-05    140  |  112<H>  |  9   ----------------------------<  134<H>  3.7   |  24  |  0.52    Ca    8.4      05 Jun 2021 08:23    TPro  4.5<L>  /  Alb  2.1<L>  /  TBili  0.5  /  DBili  x   /  AST  15  /  ALT  <6<L>  /  AlkPhos  74  06-05    PT/INR - ( 05 Jun 2021 08:23 )   PT: 13.5 sec;   INR: 1.14 ratio         PTT - ( 04 Jun 2021 06:50 )  PTT:25.8 sec    colonoscopy no bleed , hemorrhoid, rectal ulcer                   LABS:                        8.6    6.57  )-----------( 232      ( 04 Jun 2021 11:10 )             27.4     06-04    147<H>  |  116<H>  |  9   ----------------------------<  97  3.5   |  25  |  0.59    Ca    9.1      04 Jun 2021 06:50    TPro  5.0<L>  /  Alb  2.2<L>  /  TBili  0.5  /  DBili  x   /  AST  22  /  ALT  9<L>  /  AlkPhos  86  06-04    PT/INR - ( 04 Jun 2021 06:50 )   PT: 12.8 sec;   INR: 1.08 ratio         PTT - ( 04 Jun 2021 06:50 )  PTT:25.8 sec    CAPILLARY BLOOD GLUCOSE      POCT Blood Glucose.: 102 mg/dL (04 Jun 2021 11:52)  POCT Blood Glucose.: 115 mg/dL (04 Jun 2021 07:55)  POCT Blood Glucose.: 85 mg/dL (03 Jun 2021 21:09)  POCT Blood Glucose.: 162 mg/dL (03 Jun 2021 17:08)        RADIOLOGY & ADDITIONAL TESTS:      Imaging  Reviewed:  YES    Consultant(s) Notes Reviewed:   YES      Plan of care was discussed with patient and /or primary care giver; all questions and concerns were addressed

## 2021-06-07 NOTE — PROGRESS NOTE ADULT - SUBJECTIVE AND OBJECTIVE BOX
condition same  no bleeding H/H stable on heparin  but PTT has been too high    MEDICATIONS  (STANDING):  carbidopa/levodopa/entacapone 12.5/50/200 1 Tablet(s) Oral three times a day  dextrose 5%. 1000 milliLiter(s) (60 mL/Hr) IV Continuous <Continuous>  enalapril 20 milliGRAM(s) Oral two times a day  heparin  Infusion 400 Unit(s)/Hr (4 mL/Hr) IV Continuous <Continuous>  insulin lispro (ADMELOG) corrective regimen sliding scale   SubCutaneous Before meals and at bedtime  oxybutynin XL 5 milliGRAM(s) Oral daily  pantoprazole  Injectable 40 milliGRAM(s) IV Push two times a day  rasagiline Tablet 1 milliGRAM(s) Oral daily  senna 2 Tablet(s) Oral at bedtime    MEDICATIONS  (PRN):  acetaminophen   Tablet .. 650 milliGRAM(s) Oral every 6 hours PRN Temp greater or equal to 38C (100.4F), Moderate Pain (4 - 6)  ALBUTerol    90 MICROgram(s) HFA Inhaler 2 Puff(s) Inhalation every 6 hours PRN Shortness of Breath and/or Wheezing  ondansetron Injectable 4 milliGRAM(s) IV Push every 6 hours PRN Nausea and/or Vomiting  polyethylene glycol 3350 17 Gram(s) Oral daily PRN Constipation      Allergies    No Known Allergies    Intolerances        Vital Signs Last 24 Hrs  T(C): 36.9 (07 Jun 2021 05:05), Max: 36.9 (07 Jun 2021 05:05)  T(F): 98.5 (07 Jun 2021 05:05), Max: 98.5 (07 Jun 2021 05:05)  HR: 72 (07 Jun 2021 05:05) (66 - 77)  BP: 128/79 (07 Jun 2021 05:05) (116/64 - 148/80)  BP(mean): --  RR: 18 (07 Jun 2021 05:05) (18 - 18)  SpO2: 98% (07 Jun 2021 05:05) (98% - 100%)    PHYSICAL EXAM  General: adult in NAD  HEENT: clear oropharynx, anicteric sclera, pink conjunctiva  Neck: supple  CV: normal S1/S2 with no murmur rubs or gallops  Lungs: positive air movement b/l ant lungs,clear to auscultation, no wheezes, no rales  Abdomen: soft non-tender non-distended, no hepatosplenomegaly  Ext: no clubbing cyanosis or edema  Skin: no rashes and no petechiae  Neuro: alert and oriented X 4, no focal deficits  LABS:                          9.3    7.39  )-----------( 260      ( 07 Jun 2021 07:55 )             27.7         Mean Cell Volume : 85.0 fl  Mean Cell Hemoglobin : 28.5 pg  Mean Cell Hemoglobin Concentration : 33.6 gm/dL  Auto Neutrophil # : x  Auto Lymphocyte # : x  Auto Monocyte # : x  Auto Eosinophil # : x  Auto Basophil # : x  Auto Neutrophil % : x  Auto Lymphocyte % : x  Auto Monocyte % : x  Auto Eosinophil % : x  Auto Basophil % : x    Serial CBC  Hematocrit 27.7  Hemoglobin 9.3  Plat 260  RBC 3.26  WBC 7.39  Serial CBC  Hematocrit 28.6  Hemoglobin 9.5  Plat 247  RBC 3.37  WBC 7.28  Serial CBC  Hematocrit 28.8  Hemoglobin 9.6  Plat 254  RBC 3.38  WBC 6.43  Serial CBC  Hematocrit 26.9  Hemoglobin 8.9  Plat 223  RBC 3.16  WBC 6.81  Serial CBC  Hematocrit 22.4  Hemoglobin 7.4  Plat 239  RBC 2.59  WBC 6.07  Serial CBC  Hematocrit 27.4  Hemoglobin 8.6  Plat 232  RBC 3.14  WBC 6.57  Serial CBC  Hematocrit 28.8  Hemoglobin 9.2  Plat 238  RBC 3.31  WBC 6.37    06-07    140  |  109<H>  |  5<L>  ----------------------------<  111<H>  3.7   |  24  |  0.65    Ca    8.7      07 Jun 2021 07:55    TPro  5.0<L>  /  Alb  2.1<L>  /  TBili  0.5  /  DBili  x   /  AST  17  /  ALT  7<L>  /  AlkPhos  88  06-07      PT/INR - ( 06 Jun 2021 06:07 )   PT: 13.0 sec;   INR: 1.10 ratio         PTT - ( 07 Jun 2021 07:55 )  PTT:81.3 sec    Ferritin, Serum: 451 ng/mL (06-02 @ 09:41)  Folate, Serum: 8.1 ng/mL (06-02 @ 09:41)  Vitamin B12, Serum: 445 pg/mL (06-02 @ 09:41)  Iron - Total Binding Capacity.: 169 ug/dL (06-02 @ 06:23)            BLOOD SMEAR INTERPRETATION:       RADIOLOGY & ADDITIONAL STUDIES:

## 2021-06-07 NOTE — CONSULT NOTE ADULT - ASSESSMENT
74 yo female with bilateral DVTs 76 yo female with bilateral lower extremity DVT, on anticoagulation. IVC filter placement was requested.

## 2021-06-07 NOTE — PROGRESS NOTE ADULT - SUBJECTIVE AND OBJECTIVE BOX
HPI:  75 YOF admitted with rectal bleeding.  CT revealed DVT, pt refused further imaging.  IR consulted for IVC filter placement.  Heme/Onc started patient specific Hep gtt.    OVERNIGHT EVENTS:  No new overnight events     REVIEW OF SYSTEMS:      CONSTITUTIONAL: No fever  EYES: no acute visual disturbances  NECK: No pain or stiffness  RESPIRATORY: No cough; No shortness of breath  CARDIOVASCULAR: No chest pain, no palpitations  GASTROINTESTINAL: No pain. No nausea, vomiting or diarrhea   NEUROLOGICAL: No headache or numbness, no tremors  MUSCULOSKELETAL: No joint pain, no muscle pain  GENITOURINARY: no dysuria, no frequency, no hesitancy  PSYCHIATRY: no depression, no anxiety  ALL OTHER  ROS negative        Vital Signs Last 24 Hrs  T(C): 36.9 (07 Jun 2021 05:05), Max: 36.9 (07 Jun 2021 05:05)  T(F): 98.5 (07 Jun 2021 05:05), Max: 98.5 (07 Jun 2021 05:05)  HR: 72 (07 Jun 2021 05:05) (71 - 77)  BP: 128/79 (07 Jun 2021 05:05) (128/79 - 148/80)  BP(mean): --  RR: 18 (07 Jun 2021 05:05) (18 - 18)  SpO2: 98% (07 Jun 2021 05:05) (98% - 100%)    ________________________________________________  PHYSICAL EXAM:    GENERAL: NAD  HEENT: Normocephalic; conjunctivae and sclerae clear;  NECK : supple, no JVD  CHEST/LUNG: Clear to auscultation  HEART: S1 S2  regular  ABDOMEN: Soft, Nontender, Nondistended; Bowel sounds present  EXTREMITIES: no cyanosis; no LE edema; no calf tenderness  SKIN: warm and dry  NERVOUS SYSTEM:  Alert; no new deficits    _________________________________________________  CURRENT MEDICATIONS:    MEDICATIONS  (STANDING):  carbidopa/levodopa/entacapone 12.5/50/200 1 Tablet(s) Oral three times a day  dextrose 5%. 1000 milliLiter(s) (60 mL/Hr) IV Continuous <Continuous>  enalapril 20 milliGRAM(s) Oral two times a day  heparin  Infusion 400 Unit(s)/Hr (4 mL/Hr) IV Continuous <Continuous>  insulin lispro (ADMELOG) corrective regimen sliding scale   SubCutaneous Before meals and at bedtime  oxybutynin XL 5 milliGRAM(s) Oral daily  pantoprazole  Injectable 40 milliGRAM(s) IV Push two times a day  rasagiline Tablet 1 milliGRAM(s) Oral daily  senna 2 Tablet(s) Oral at bedtime    MEDICATIONS  (PRN):  acetaminophen   Tablet .. 650 milliGRAM(s) Oral every 6 hours PRN Temp greater or equal to 38C (100.4F), Moderate Pain (4 - 6)  ALBUTerol    90 MICROgram(s) HFA Inhaler 2 Puff(s) Inhalation every 6 hours PRN Shortness of Breath and/or Wheezing  ondansetron Injectable 4 milliGRAM(s) IV Push every 6 hours PRN Nausea and/or Vomiting  polyethylene glycol 3350 17 Gram(s) Oral daily PRN Constipation      __________________________________________________  LABS:                          9.3    7.39  )-----------( 260      ( 07 Jun 2021 07:55 )             27.7     06-07    140  |  109<H>  |  5<L>  ----------------------------<  111<H>  3.7   |  24  |  0.65    Ca    8.7      07 Jun 2021 07:55    TPro  5.0<L>  /  Alb  2.1<L>  /  TBili  0.5  /  DBili  x   /  AST  17  /  ALT  7<L>  /  AlkPhos  88  06-07    PT/INR - ( 06 Jun 2021 06:07 )   PT: 13.0 sec;   INR: 1.10 ratio         PTT - ( 07 Jun 2021 07:55 )  PTT:81.3 sec    CAPILLARY BLOOD GLUCOSE      POCT Blood Glucose.: 139 mg/dL (07 Jun 2021 11:27)  POCT Blood Glucose.: 138 mg/dL (07 Jun 2021 07:44)  POCT Blood Glucose.: 140 mg/dL (06 Jun 2021 21:21)  POCT Blood Glucose.: 125 mg/dL (06 Jun 2021 16:49)      __________________________________________________  RADIOLOGY & ADDITIONAL TESTS:    Imaging Personally Reviewed:  YES    < from: CT Abdomen and Pelvis w/ IV Cont (06.01.21 @ 10:11) >  FINDINGS:  LOWER CHEST: Clear lung bases.    LIVER: Multiple subcentimeter hepatic hypodensities, too small to further characterize.  BILE DUCTS: Normal caliber.  GALLBLADDER: Cholelithiasis.  SPLEEN: Within normal limits.  PANCREAS: Mildly atrophic.  ADRENALS: Within normal limits.  KIDNEYS/URETERS: Subcentimeter renal hypodensities, too small to further characterize. No hydronephrosis.    BLADDER: Within normal limits.  REPRODUCTIVE ORGANS: Uterus and left adnexa within normal limits. 3.7 x 2.8 cm right adnexal mass.    BOWEL: No bowel obstruction. Appendix is normal. Colonic diverticulosis. Rectal wall thickening.  PERITONEUM: Trace pelvic fluid.  VESSELS: Atherosclerotic changes. Filling defects noted within the left common femoral, femoral, and deep femoral veins.  RETROPERITONEUM/LYMPH NODES: No lymphadenopathy.  ABDOMINAL WALL: Small fat-containing left inguinal hernia.  BONES: Degenerative changes. Severe compression deformity of L2. Fixation cement noted within the L2 vertebral body. Mild wedge deformity of T11.    IMPRESSION:    1. Rectal wall thickening, question proctitis.  2. Colonic diverticulosis without CT evidence of acute diverticulitis. No bowel obstruction.  3. Right adnexal mass (3.7 x 2.8 cm). Recommend pelvic ultrasound for further assessment.  4. Deep venous thrombosis within the imaged left lower extremity (left common femoral, femoral, and deep femoral veins).    < end of copied text >    Consultant(s) Notes Reviewed:   YES     Plan of care was discussed with patient and /or primary care giver; all questions and concerns were addressed and care was aligned with patient's wishes.    Plan discussed with attending and consulting physicians.

## 2021-06-07 NOTE — CONSULT NOTE ADULT - ATTENDING COMMENTS
I reviewed the above and edited where appropriate.   Chart and relevant imaging reviewed. Bilateral lower extremity DVT.  Patient is currently on heparin drip, with no adverse events reported, stable H/H and no further episodes of bleeding. In light of these findings, there is no urgent indication for IVC filter placement at this time, as risks/benefits is not in patient's favor.   -Continue current management as per primary team.  -IF patient won't tolerate anticoagulation therapy, will re-evaluate for IVC filter placement.    Please call IR if clinical situation changes and/or new information becomes available.
case dw IR attending.  If pt continues to have GI bleed and contraindication to anticoagulation, IVC filter would be indicated, and they are avail to place if needed

## 2021-06-07 NOTE — PROGRESS NOTE ADULT - PROBLEM SELECTOR PLAN 4
CT noted above  Cancer marker 125: 10, GI Ca marker 19-9: <2  Follow up for transvaginal u/s s/p colonoscopy   Dr. Yan, Heme/Onc, following

## 2021-06-07 NOTE — PROGRESS NOTE ADULT - SUBJECTIVE AND OBJECTIVE BOX
[   ] ICU                                          [   ] CCU                                      [ X  ] Medical Floor    Patient is a 75 year old with rectal bleeding. GI consulted to evaluate.      Patient is a 75 year old female from [place], with past medical history significant for Parkinson's, COPD, diverticulosis, HTN, DM and HLD, who presented to the ED due to rectal bleed. History is limited from patient due to her being a poor historian. As per ED provider note, patient was sent to the ED for concern for vaginal bleed. In ED, patient is noted to have rectal bleed instead of vaginal bleed. Patient is confused unable to provide any history. No abdominal pain, nausea, vomiting, hematemesis, melena, fever, chills, chest pain, SOB, cough, hematuria, dysuria or diarrhea reported.       Today patient appears comfortable with no new complaints. No abdominal pain, N/V, hematemesis, hematochezia, melena, fever, chills, chest pain, SOB, cough or diarrhea reported.    PAIN MANAGEMENT:  Pain Scale:                0/10  Pain Location:      Prior Colonoscopy: Unknown    PAST MEDICAL HISTORY  Parkinson disease  COPD  Diverticulosis  HTN (hypertension)  Diabetes mellitus  HLD (hyperlipidemia)      PAST SURGICAL HISTORY  No significant past surgical history      Allergies    No Known Allergies    Intolerances  None        SOCIAL HISTORY  Advanced Directives:       [ X ] Full Code       [  ] DNR  Marital Status:         [  ] M      [ X ] S      [  ] D       [  ] W  Children:       [ X ] Yes      [  ] No  Occupation:        [  ] Employed       [ X ] Unemployed       [  ] Retired  Diet:       [ X ] Regular       [  ] PEG feeding          [  ] NG tube feeding  Drug Use:           [ X ] Patient denied          [  ] Yes  Alcohol:           [ X ] No             [  ] Yes (socially)         [  ] Yes (chronic)  Tobacco:           [  ] Yes           [ X ] No    FAMILY HISTORY  [ X ] Heart Disease            [ X ] Diabetes             [ X ] HTN             [  ] Colon Cancer             [  ] Stomach Cancer              [  ] Pancreatic Cancer        VITALS  Vital Signs Last 24 Hrs  T(C): 37 (07 Jun 2021 13:52), Max: 37 (07 Jun 2021 13:52)  T(F): 98.6 (07 Jun 2021 13:52), Max: 98.6 (07 Jun 2021 13:52)  HR: 74 (07 Jun 2021 13:52) (71 - 77)  BP: 127/74 (07 Jun 2021 13:52) (127/74 - 148/80)   RR: 18 (07 Jun 2021 13:52) (18 - 18)  SpO2: 98% (07 Jun 2021 13:52) (98% - 100%)       MEDICATIONS  (STANDING):  carbidopa/levodopa/entacapone 12.5/50/200 1 Tablet(s) Oral three times a day  dextrose 5%. 1000 milliLiter(s) (60 mL/Hr) IV Continuous <Continuous>  enalapril 20 milliGRAM(s) Oral two times a day  heparin  Infusion 400 Unit(s)/Hr (4 mL/Hr) IV Continuous <Continuous>  insulin lispro (ADMELOG) corrective regimen sliding scale   SubCutaneous Before meals and at bedtime  oxybutynin XL 5 milliGRAM(s) Oral daily  pantoprazole  Injectable 40 milliGRAM(s) IV Push two times a day  rasagiline Tablet 1 milliGRAM(s) Oral daily  senna 2 Tablet(s) Oral at bedtime    MEDICATIONS  (PRN):  acetaminophen   Tablet .. 650 milliGRAM(s) Oral every 6 hours PRN Temp greater or equal to 38C (100.4F), Moderate Pain (4 - 6)  ALBUTerol    90 MICROgram(s) HFA Inhaler 2 Puff(s) Inhalation every 6 hours PRN Shortness of Breath and/or Wheezing  ondansetron Injectable 4 milliGRAM(s) IV Push every 6 hours PRN Nausea and/or Vomiting  polyethylene glycol 3350 17 Gram(s) Oral daily PRN Constipation                            9.3    7.39  )-----------( 260      ( 07 Jun 2021 07:55 )             27.7       06-07    140  |  109<H>  |  5<L>  ----------------------------<  111<H>  3.7   |  24  |  0.65    Ca    8.7      07 Jun 2021 07:55    TPro  5.0<L>  /  Alb  2.1<L>  /  TBili  0.5  /  DBili  x   /  AST  17  /  ALT  7<L>  /  AlkPhos  88  06-07      PT/INR - ( 06 Jun 2021 06:07 )   PT: 13.0 sec;   INR: 1.10 ratio         PTT - ( 07 Jun 2021 16:08 )  PTT:54.3 sec

## 2021-06-07 NOTE — PROGRESS NOTE ADULT - PROBLEM SELECTOR PLAN 1
Colonoscopy revealed   Anal Canal        Large internal hemorrhoids  Rectum	         Large ulcer bleeding 2 clips applied to stop bleeding  Sigmoid Colon  Few diverticula  Monitor for s/s of bleeding  Follow up CBC in AM

## 2021-06-07 NOTE — CONSULT NOTE ADULT - SUBJECTIVE AND OBJECTIVE BOX
This is a 75 year old female from [place], with PMH of Parkinson's, COPD, diverticulosis, HTN, DM and HLD, who presented to the Emergency room due to rectal bleeding. History was limited from the patient for she is a poor historian.   As per ED provider note, the patient was sent to the ED for concern of a vaginal bleed, but in the ER, she was found patient to have rectal bleeding instead.   GI was consulted and a colonoscopy was attempted but patient was poorly prepped and was unsuccessful.  On admission a CT of the abdomen and pelvis were done and she was found to have a DVT within the "imaged left lower extremity(left common femoral, femoral and deep femoral veins)" This is a 75 year old female from [place], with PMH of Parkinson's, COPD, diverticulosis, HTN, DM and HLD, who presented to the Emergency room due to rectal bleeding. History was limited from the patient for she is a poor historian.   As per ED provider note, the patient was sent to the ED for concern of a vaginal bleed, but in the ER, she was found patient to have rectal bleeding instead.   GI was consulted and a colonoscopy was attempted but patient was poorly prepped and was unsuccessful.  On admission a CT of the abdomen and pelvis were done and she was found to have a DVT within the "imaged left lower extremity(left common femoral, femoral and deep femoral veins)".  After this a lower extremity duplex was ordered and she was then found to have non compressible thrombi in the peroneal, popliteal and lower extremity vein.  She refused to allow them to image her left leg.    Based on these findings IR was consulted for IVC filter placement.      PAST MEDICAL & SURGICAL HISTORY:  Parkinson disease  COPD  Diverticulosis  HTN (hypertension)  Diabetes mellitus  HLD (hyperlipidemia)    Home Medications:  bacitracin 500 units/g topical ointment: Apply topically to affected area 4 times a day (01   calcitriol 0.5 mcg oral capsule: 1 cap(s) orally once a day (01 Jun 2021 12:11)  carbidopa/levodopa/entacapone 12.5 mg-50 mg-200 mg oral tablet: 1 tab(s) orally   droxidopa 100 mg oral capsule: 1 cap(s) orally 3 times a day (01 Jun 2021 12:11)  enalapril 20 mg oral tablet: 1 tab(s) orally 2 times a day (01 Jun 2021 12:11)  glipiZIDE 5 mg oral tablet: 1 tab(s) orally once a day (01 Jun 2021 12:11)  MiraLax oral powder for reconstitution: 1 packet(s) orally once a day, As Needed   Nourianz 20 mg oral tablet: 1 tab(s) orally once a day (01 Jun 2021 12:11)  oxybutynin 5 mg/24 hours oral tablet, extended release: 1 tab(s) orally once a day   rasagiline 1 mg oral tablet: 1 tab(s) orally once a day (01 Jun 2021 12:11)  Senna 8.6 mg oral tablet: 2 tab(s) orally once a day (at bedtime) (01 Jun 2021 12:11)  Tylenol 325 mg oral tablet: 2 tab(s) orally every 6 hours, As Needed (01 Jun 2021 12:11)    MEDICATIONS  (STANDING):  carbidopa/levodopa/entacapone 12.5/50/200 1 Tablet(s) Oral three times a day  dextrose 5%. 1000 milliLiter(s) (60 mL/Hr) IV Continuous <Continuous>  enalapril 20 milliGRAM(s) Oral two times a day  heparin  Infusion 400 Unit(s)/Hr (4 mL/Hr) IV Continuous <Continuous>  insulin lispro (ADMELOG) corrective regimen sliding scale   SQ Before meals & at bedtime  oxybutynin XL 5 milliGRAM(s) Oral daily  pantoprazole  Injectable 40 milliGRAM(s) IV Push two times a day  rasagiline Tablet 1 milliGRAM(s) Oral daily  senna 2 Tablet(s) Oral at bedtime    MEDICATIONS  (PRN):  acetaminophen   Tablet .. 650 milliGRAM(s) Oral every 6 hours PRN Temp   ALBUTerol    90 MICROgram(s) HFA Inhaler 2 Puff(s) Inhalation every 6 hours PRN Shortness of Breath and/or Wheezing  ondansetron Injectable 4 milliGRAM(s) IV Push every 6 hours PRN Nausea and/or Vomiting  polyethylene glycol 3350 17 Gram(s) Oral daily PRN Constipation    Vital Signs Last 24 Hrs  T(C): 36.9 (07 Jun 2021 05:05), Max: 36.9 (07 Jun 2021 05:05)  T(F): 98.5 (07 Jun 2021 05:05), Max: 98.5 (07 Jun 2021 05:05)  HR: 72 (07 Jun 2021 05:05) (66 - 77)  BP: 128/79 (07 Jun 2021 05:05) (116/64 - 148/80)  RR: 18 (07 Jun 2021 05:05) (18 - 18)  SpO2: 98% (07 Jun 2021 05:05) (98% - 100%)                          9.3    7.39  )-----------( 260      ( 07 Jun 2021 07:55 )             27.7     06-07    140  |  109<H>  |  5<L>  ----------------------------<  111<H>  3.7   |  24  |  0.65    Ca    8.7      07 Jun 2021 07:55    TPro  5.0<L>  /  Alb  2.1<L>  /  TBili  0.5  /  DBili  x   /  AST  17  /  ALT  7<L>  /  AlkPhos  88  06-07  PT/INR - ( 06 Jun 2021 06:07 )   PT: 13.0 sec;   INR: 1.10 ratio         PTT - ( 07 Jun 2021 07:55 )  PTT:81.3 sec    from: CT Abdomen and Pelvis w/ IV Cont (06.01.21 @ 10:11) >  IMPRESSION:    1. Rectal wall thickening, question proctitis.  2. Colonic diverticulosis without CT evidence of acute diverticulitis. No bowel obstruction.  3. Right adnexal mass (3.7 x 2.8 cm). Recommend pelvic ultrasound for further assessment.  4. Deep venous thrombosis within the imaged left lower extremity (left common femoral, femoral, and deep femoral veins).    < from: US Duplex Venous Lower Ext Complete, Bilateral (06.01.21 @ 14:06) >  RIGHT:  Normal compressibility of the RIGHT common femoral vein. Noncompressible thrombi are noted in the peroneal vein, popliteal vein and lower femoralvein, compatible with deep vein thrombosis.    LEFT: The patient refused examination of the left leg.    IMPRESSION:  Deep vein thrombosis in the right leg as above.  Dr. Arellano is aware.    The patient refused examination of the left leg.    < end of copied text >

## 2021-06-07 NOTE — PROGRESS NOTE ADULT - TIME BILLING
patient with rectal bleed and dvts   care plan as above   hematology , gi on case    care plan d/w np , and patient's family

## 2021-06-07 NOTE — PROGRESS NOTE ADULT - ASSESSMENT
75 year old female from [place], with PMH of Parkinson's, COPD, diverticulosis, HTN, DM and HLD, who presented to the ED due to rectal bleed. PT was admitted for medicine for suspected GI bleed. CT abdomen showing rectal wall thickening, colonic diverticulosis without diverticulitis, right adnexal mass and DVT noted in LLE (left common femoral, femoral and deep femoral veins). US Duplex Wil. Lower Ext with DVT in the right leg (was unable to examine L leg as patient refused).   GI Dr. Galvez consulted and pt was recommended for colonoscopy. Colonoscopy was done on 6/2 however it was incomplete due to poor bowel prep (findings: large internal hemorrhoids and large amount of stool and blood clots). On 6/3 hg trending down (7.4) that pt was transfused 1 PRBC. NGT was attempted for bowel prep but pt became agitated and kept pulling the NGT. Pt received water enema prior to scheduled colonoscopy this morning however she started having bright red stool with some clots.   Pt is not a candidate for AC given signs of GIB. IR was consulted for IVC filter but informed from IR that based on imaging pt is not a candidate for IVF filter with IR. Vascular consult recalled and vascular is following patient.    Hem/on Dr Yan recommended transvaginal ultrasound for adnexal mass. Will decide when to obtain transvaginal u/s after colonoscopy is done per Dr Diez

## 2021-06-08 ENCOUNTER — TRANSCRIPTION ENCOUNTER (OUTPATIENT)
Age: 76
End: 2021-06-08

## 2021-06-08 LAB
ANION GAP SERPL CALC-SCNC: 7 MMOL/L — SIGNIFICANT CHANGE UP (ref 5–17)
APTT BLD: 38.8 SEC — HIGH (ref 27.5–35.5)
APTT BLD: 56.3 SEC — HIGH (ref 27.5–35.5)
BUN SERPL-MCNC: 6 MG/DL — LOW (ref 7–18)
CALCIUM SERPL-MCNC: 8.5 MG/DL — SIGNIFICANT CHANGE UP (ref 8.4–10.5)
CHLORIDE SERPL-SCNC: 110 MMOL/L — HIGH (ref 96–108)
CO2 SERPL-SCNC: 24 MMOL/L — SIGNIFICANT CHANGE UP (ref 22–31)
CREAT SERPL-MCNC: 0.67 MG/DL — SIGNIFICANT CHANGE UP (ref 0.5–1.3)
GLUCOSE BLDC GLUCOMTR-MCNC: 131 MG/DL — HIGH (ref 70–99)
GLUCOSE BLDC GLUCOMTR-MCNC: 132 MG/DL — HIGH (ref 70–99)
GLUCOSE BLDC GLUCOMTR-MCNC: 139 MG/DL — HIGH (ref 70–99)
GLUCOSE BLDC GLUCOMTR-MCNC: 168 MG/DL — HIGH (ref 70–99)
GLUCOSE SERPL-MCNC: 107 MG/DL — HIGH (ref 70–99)
HCT VFR BLD CALC: 26.2 % — LOW (ref 34.5–45)
HCT VFR BLD CALC: 27.6 % — LOW (ref 34.5–45)
HGB BLD-MCNC: 8.5 G/DL — LOW (ref 11.5–15.5)
HGB BLD-MCNC: 9 G/DL — LOW (ref 11.5–15.5)
MAGNESIUM SERPL-MCNC: 1.8 MG/DL — SIGNIFICANT CHANGE UP (ref 1.6–2.6)
MCHC RBC-ENTMCNC: 28 PG — SIGNIFICANT CHANGE UP (ref 27–34)
MCHC RBC-ENTMCNC: 28.1 PG — SIGNIFICANT CHANGE UP (ref 27–34)
MCHC RBC-ENTMCNC: 32.4 GM/DL — SIGNIFICANT CHANGE UP (ref 32–36)
MCHC RBC-ENTMCNC: 32.6 GM/DL — SIGNIFICANT CHANGE UP (ref 32–36)
MCV RBC AUTO: 86 FL — SIGNIFICANT CHANGE UP (ref 80–100)
MCV RBC AUTO: 86.8 FL — SIGNIFICANT CHANGE UP (ref 80–100)
NRBC # BLD: 0 /100 WBCS — SIGNIFICANT CHANGE UP (ref 0–0)
NRBC # BLD: 0 /100 WBCS — SIGNIFICANT CHANGE UP (ref 0–0)
PHOSPHATE SERPL-MCNC: 3.6 MG/DL — SIGNIFICANT CHANGE UP (ref 2.5–4.5)
PLATELET # BLD AUTO: 242 K/UL — SIGNIFICANT CHANGE UP (ref 150–400)
PLATELET # BLD AUTO: 246 K/UL — SIGNIFICANT CHANGE UP (ref 150–400)
POTASSIUM SERPL-MCNC: 3.7 MMOL/L — SIGNIFICANT CHANGE UP (ref 3.5–5.3)
POTASSIUM SERPL-SCNC: 3.7 MMOL/L — SIGNIFICANT CHANGE UP (ref 3.5–5.3)
RBC # BLD: 3.02 M/UL — LOW (ref 3.8–5.2)
RBC # BLD: 3.21 M/UL — LOW (ref 3.8–5.2)
RBC # FLD: 16.5 % — HIGH (ref 10.3–14.5)
RBC # FLD: 16.7 % — HIGH (ref 10.3–14.5)
SODIUM SERPL-SCNC: 141 MMOL/L — SIGNIFICANT CHANGE UP (ref 135–145)
WBC # BLD: 6.21 K/UL — SIGNIFICANT CHANGE UP (ref 3.8–10.5)
WBC # BLD: 7.59 K/UL — SIGNIFICANT CHANGE UP (ref 3.8–10.5)
WBC # FLD AUTO: 6.21 K/UL — SIGNIFICANT CHANGE UP (ref 3.8–10.5)
WBC # FLD AUTO: 7.59 K/UL — SIGNIFICANT CHANGE UP (ref 3.8–10.5)

## 2021-06-08 RX ORDER — PANTOPRAZOLE SODIUM 20 MG/1
40 TABLET, DELAYED RELEASE ORAL
Refills: 0 | Status: DISCONTINUED | OUTPATIENT
Start: 2021-06-08 | End: 2021-06-21

## 2021-06-08 RX ADMIN — Medication 1: at 21:14

## 2021-06-08 RX ADMIN — PANTOPRAZOLE SODIUM 40 MILLIGRAM(S): 20 TABLET, DELAYED RELEASE ORAL at 05:07

## 2021-06-08 RX ADMIN — Medication 20 MILLIGRAM(S): at 17:12

## 2021-06-08 RX ADMIN — CARBIDOPA, LEVODOPA, AND ENTACAPONE 1 TABLET(S): 50; 200; 200 TABLET, FILM COATED ORAL at 05:06

## 2021-06-08 RX ADMIN — CARBIDOPA, LEVODOPA, AND ENTACAPONE 1 TABLET(S): 50; 200; 200 TABLET, FILM COATED ORAL at 14:01

## 2021-06-08 RX ADMIN — RASAGILINE 1 MILLIGRAM(S): 0.5 TABLET ORAL at 12:44

## 2021-06-08 RX ADMIN — PANTOPRAZOLE SODIUM 40 MILLIGRAM(S): 20 TABLET, DELAYED RELEASE ORAL at 21:11

## 2021-06-08 RX ADMIN — Medication 5 MILLIGRAM(S): at 12:44

## 2021-06-08 RX ADMIN — Medication 20 MILLIGRAM(S): at 05:49

## 2021-06-08 RX ADMIN — CARBIDOPA, LEVODOPA, AND ENTACAPONE 1 TABLET(S): 50; 200; 200 TABLET, FILM COATED ORAL at 21:10

## 2021-06-08 RX ADMIN — SENNA PLUS 2 TABLET(S): 8.6 TABLET ORAL at 21:10

## 2021-06-08 NOTE — PROGRESS NOTE ADULT - PROBLEM SELECTOR PLAN 1
Colonoscopy revealed   Anal Canal        Large internal hemorrhoids  Rectum	         Large ulcer bleeding 2 clips applied to stop bleeding  Sigmoid Colon  Few diverticula  Bleeding restarted, GI notified  Follow up CBC in AM  Dr. Galvez, GI, following

## 2021-06-08 NOTE — DISCHARGE NOTE PROVIDER - HOSPITAL COURSE
75 YOF admitted with rectal bleeding.  Imaging revealed R femoral DVT and adnexal mass.  GI and Heme/Onc consulted.    Colonoscopy revealed large internal hemorrhoids and a large bleeding ulcer.  The ulcer was clipped.    Heme/Onc recommended patient specific Hep gtt.  IR consulted, recommended conservative mgmt.    Pt developed rectal bleeding again.  Hep gtt stopped, Heme/Onc and GI notified.  IR reconsulted for IVC filter placement.    NOT COMPLETE     75 year old female from [place] with past medical history of Parkinson's Disease, COPD, diverticulosis, HTN, DM and HLD who presented to the ED for c/o rectal bleeding. CT A/P showed rectal wall thickening, colonic diverticulosis without diverticulitis, right adnexal mass, and DVT in LLE (left common femoral, femoral and deep femoral veins), and US duplex + for RLE DVT - patient refused to have the LLE examined. Patient is now s/p IVC filter placement for DVT given cannot tolerate AC's in the setting of GIB - had episode of bleeding while on heparin gtt. Colonoscopy with finding of rectal ulcer now s/p 2 clips. no active bleeding noted in colonoscopy.  Heme/onc onboard for hypercoagulable workup and adnexal mass - ultrasound with uterine fibroid.  COVID-19 PCR negative since 6/14. Pt had exposure to confirmed case of COVID-19. Pt was placed on Isolation per protocol, for quaratine. Pt can come off isolation. there is no indication for steroids or remdesivir given 100% on room air. Pt can D/c on 6/21 back to NH per MERLENE.   Please note that this a brief summary of hospital course please refer to daily progress notes and consult notes for full course and events  -----------------updated on 6/17---------------------------------     75 year old female from [place] with past medical history of Parkinson's Disease, COPD, diverticulosis, HTN, DM and HLD who presented to the ED for c/o rectal bleeding. CT A/P showed rectal wall thickening, colonic diverticulosis without diverticulitis, right adnexal mass, and DVT in LLE (left common femoral, femoral and deep femoral veins), and US duplex + for RLE DVT - patient refused to have the LLE examined. Patient is now s/p IVC filter placement for DVT given cannot tolerate AC's in the setting of GIB - had episode of bleeding while on heparin gtt. Colonoscopy with finding of rectal ulcer now s/p 2 clips. no active bleeding noted in colonoscopy.  Heme/onc onboard for hypercoagulable workup and adnexal mass - ultrasound with uterine fibroid.  COVID-19 PCR negative since 6/14. Pt had exposure to confirmed case of COVID-19. Pt was placed on Isolation per protocol, for quaratine. Pt can come off isolation. there is no indication for steroids or remdesivir given 100% on room air. Pt can D/c on 6/21 back to NH per .   Please note that this a brief summary of hospital course please refer to daily progress notes and consult notes for full course and events

## 2021-06-08 NOTE — DISCHARGE NOTE PROVIDER - NSDCMRMEDTOKEN_GEN_ALL_CORE_FT
bacitracin 500 units/g topical ointment: Apply topically to affected area 4 times a day  calcitriol 0.5 mcg oral capsule: 1 cap(s) orally once a day  carbidopa/levodopa/entacapone 12.5 mg-50 mg-200 mg oral tablet: 1 tab(s) orally 3 times a day  droxidopa 100 mg oral capsule: 1 cap(s) orally 3 times a day  enalapril 20 mg oral tablet: 1 tab(s) orally 2 times a day  glipiZIDE 5 mg oral tablet: 1 tab(s) orally once a day  MiraLax oral powder for reconstitution: 1 packet(s) orally once a day, As Needed  Nourianz 20 mg oral tablet: 1 tab(s) orally once a day  oxybutynin 5 mg/24 hours oral tablet, extended release: 1 tab(s) orally once a day  rasagiline 1 mg oral tablet: 1 tab(s) orally once a day  Senna 8.6 mg oral tablet: 2 tab(s) orally once a day (at bedtime)  Tylenol 325 mg oral tablet: 2 tab(s) orally every 6 hours, As Needed   ascorbic acid 500 mg oral tablet: 1 tab(s) orally once a day  bacitracin 500 units/g topical ointment: Apply topically to affected area 4 times a day  calcitriol 0.5 mcg oral capsule: 1 cap(s) orally once a day  carbidopa/levodopa/entacapone 12.5 mg-50 mg-200 mg oral tablet: 1 tab(s) orally 3 times a day  droxidopa 100 mg oral capsule: 1 cap(s) orally 3 times a day  enalapril 20 mg oral tablet: 1 tab(s) orally 2 times a day  ferrous sulfate 325 mg (65 mg elemental iron) oral tablet: 1 tab(s) orally once a day  glipiZIDE 5 mg oral tablet: 1 tab(s) orally once a day  MiraLax oral powder for reconstitution: 1 packet(s) orally once a day, As Needed  Nourianz 20 mg oral tablet: 1 tab(s) orally once a day  oxybutynin 5 mg/24 hours oral tablet, extended release: 1 tab(s) orally once a day  rasagiline 1 mg oral tablet: 1 tab(s) orally once a day  Senna 8.6 mg oral tablet: 2 tab(s) orally once a day (at bedtime)  Tylenol 325 mg oral tablet: 2 tab(s) orally every 6 hours, As Needed

## 2021-06-08 NOTE — PROGRESS NOTE ADULT - SUBJECTIVE AND OBJECTIVE BOX
HPI:  75 YOF admitted with rectal bleeding.  CT revealed DVT, pt refused further imaging.  IR consulted for IVC filter placement.  Heme/Onc started patient specific Hep gtt.    OVERNIGHT EVENTS:  No new overnight events , no bleeding , eating fair  MEDICATIONS  (STANDING):  carbidopa/levodopa/entacapone 12.5/50/200 1 Tablet(s) Oral three times a day  dextrose 5%. 1000 milliLiter(s) (60 mL/Hr) IV Continuous <Continuous>  enalapril 20 milliGRAM(s) Oral two times a day  heparin  Infusion 400 Unit(s)/Hr (4 mL/Hr) IV Continuous <Continuous>  insulin lispro (ADMELOG) corrective regimen sliding scale   SubCutaneous Before meals and at bedtime  oxybutynin XL 5 milliGRAM(s) Oral daily  pantoprazole  Injectable 40 milliGRAM(s) IV Push two times a day  rasagiline Tablet 1 milliGRAM(s) Oral daily  senna 2 Tablet(s) Oral at bedtime    MEDICATIONS  (PRN):  acetaminophen   Tablet .. 650 milliGRAM(s) Oral every 6 hours PRN Temp greater or equal to 38C (100.4F), Moderate Pain (4 - 6)  ALBUTerol    90 MICROgram(s) HFA Inhaler 2 Puff(s) Inhalation every 6 hours PRN Shortness of Breath and/or Wheezing  ondansetron Injectable 4 milliGRAM(s) IV Push every 6 hours PRN Nausea and/or Vomiting  polyethylene glycol 3350 17 Gram(s) Oral daily PRN Constipation    REVIEW OF SYSTEMS:      CONSTITUTIONAL: No fever  EYES: no acute visual disturbances  NECK: No pain or stiffness  RESPIRATORY: No cough; No shortness of breath  CARDIOVASCULAR: No chest pain, no palpitations  GASTROINTESTINAL: No pain. No nausea, vomiting or diarrhea   NEUROLOGICAL: No headache or numbness, no tremors  MUSCULOSKELETAL: No joint pain, no muscle pain  GENITOURINARY: no dysuria, no frequency, no hesitancy  PSYCHIATRY: no depression, no anxiety  ALL OTHER  ROS negative        Vital Signs Last 24 Hrs  T(C): 36.6 (08 Jun 2021 05:37), Max: 37.2 (07 Jun 2021 20:46)  T(F): 97.9 (08 Jun 2021 05:37), Max: 99 (07 Jun 2021 20:46)  HR: 66 (08 Jun 2021 05:37) (66 - 76)  BP: 143/85 (08 Jun 2021 05:37) (121/76 - 143/85)  BP(mean): --  RR: 18 (08 Jun 2021 05:37) (18 - 18)  SpO2: 97% (08 Jun 2021 05:37) (97% - 98%)    ________________________________________________  PHYSICAL EXAM:    GENERAL: NAD  HEENT: Normocephalic; conjunctivae and sclerae clear;  NECK : supple, no JVD  CHEST/LUNG: Clear to auscultation  HEART: S1 S2  regular  ABDOMEN: Soft, Nontender, Nondistended; Bowel sounds present  EXTREMITIES: no cyanosis; no LE edema; no calf tenderness  SKIN: warm and dry  NERVOUS SYSTEM:  Alert; no new deficits    _________________________________________________  CURRENT MEDICATIONS:      __________________________________________________  LABS:                        8.5    6.21  )-----------( 246      ( 08 Jun 2021 06:08 )             26.2     06-08    141  |  110<H>  |  6<L>  ----------------------------<  107<H>  3.7   |  24  |  0.67    Ca    8.5      08 Jun 2021 06:08  Phos  3.6     06-08  Mg     1.8     06-08    TPro  5.0<L>  /  Alb  2.1<L>  /  TBili  0.5  /  DBili  x   /  AST  17  /  ALT  7<L>  /  AlkPhos  88  06-07    PTT - ( 08 Jun 2021 03:56 )  PTT:56.3 sec                          LABS:                          9.3    7.39  )-----------( 260      ( 07 Jun 2021 07:55 )             27.7     06-07    140  |  109<H>  |  5<L>  ----------------------------<  111<H>  3.7   |  24  |  0.65    Ca    8.7      07 Jun 2021 07:55    TPro  5.0<L>  /  Alb  2.1<L>  /  TBili  0.5  /  DBili  x   /  AST  17  /  ALT  7<L>  /  AlkPhos  88  06-07    PT/INR - ( 06 Jun 2021 06:07 )   PT: 13.0 sec;   INR: 1.10 ratio         PTT - ( 07 Jun 2021 07:55 )  PTT:81.3 sec    CAPILLARY BLOOD GLUCOSE      POCT Blood Glucose.: 139 mg/dL (07 Jun 2021 11:27)  POCT Blood Glucose.: 138 mg/dL (07 Jun 2021 07:44)  POCT Blood Glucose.: 140 mg/dL (06 Jun 2021 21:21)  POCT Blood Glucose.: 125 mg/dL (06 Jun 2021 16:49)      __________________________________________________  RADIOLOGY & ADDITIONAL TESTS:    Imaging Personally Reviewed:  YES    < from: CT Abdomen and Pelvis w/ IV Cont (06.01.21 @ 10:11) >  FINDINGS:  LOWER CHEST: Clear lung bases.    LIVER: Multiple subcentimeter hepatic hypodensities, too small to further characterize.  BILE DUCTS: Normal caliber.  GALLBLADDER: Cholelithiasis.  SPLEEN: Within normal limits.  PANCREAS: Mildly atrophic.  ADRENALS: Within normal limits.  KIDNEYS/URETERS: Subcentimeter renal hypodensities, too small to further characterize. No hydronephrosis.    BLADDER: Within normal limits.  REPRODUCTIVE ORGANS: Uterus and left adnexa within normal limits. 3.7 x 2.8 cm right adnexal mass.    BOWEL: No bowel obstruction. Appendix is normal. Colonic diverticulosis. Rectal wall thickening.  PERITONEUM: Trace pelvic fluid.  VESSELS: Atherosclerotic changes. Filling defects noted within the left common femoral, femoral, and deep femoral veins.  RETROPERITONEUM/LYMPH NODES: No lymphadenopathy.  ABDOMINAL WALL: Small fat-containing left inguinal hernia.  BONES: Degenerative changes. Severe compression deformity of L2. Fixation cement noted within the L2 vertebral body. Mild wedge deformity of T11.    IMPRESSION:    1. Rectal wall thickening, question proctitis.  2. Colonic diverticulosis without CT evidence of acute diverticulitis. No bowel obstruction.  3. Right adnexal mass (3.7 x 2.8 cm). Recommend pelvic ultrasound for further assessment.  4. Deep venous thrombosis within the imaged left lower extremity (left common femoral, femoral, and deep femoral veins).    < end of copied text >    Consultant(s) Notes Reviewed:   YES     Plan of care was discussed with patient and /or primary care giver; all questions and concerns were addressed and care was aligned with patient's wishes.    Plan discussed with attending and consulting physicians.

## 2021-06-08 NOTE — PROGRESS NOTE ADULT - ASSESSMENT
· Assessment	  75 year old lady presented with rectal bleeding.  CT showed rectal thickening, adnexal mass, and DVT.    1. rectal bleeding. will do sigmoidoscopy  please give her enema  discussed with Dr. Leonor herr prep  large hemorrhoid, and rectl ulcer  cauterized    2. adnexal mass  will do transvaginal ultrasound  check ca 125    3. DVT  not on AC yet due to rectal bleeding  for sigmoidoscopy  she has rectal bleeding again  need IVC filter  IR said it can not be done  but right femoral vein is clear  vascular to follow  rectal ulcer cauterized  will begin heparin with low bolus  PTT went to 180  hold heparin for 2 hours then restart at 600 u/h  PTT is still high  will hold for 1 h then start at 400u/hr  keep PTT around 60  no bleeding seen at bedsheet

## 2021-06-08 NOTE — PROGRESS NOTE ADULT - SUBJECTIVE AND OBJECTIVE BOX
HPI:  75 YOF admitted with rectal bleeding.  CT revealed DVT, pt refused further imaging.  IR consulted for IVC filter placement.  Pt rectal bleeding restarted, Hep gtt stopped, labs ordered, Heme/Onc and GI notified.    OVERNIGHT EVENTS:  No new overnight events     REVIEW OF SYSTEMS:      CONSTITUTIONAL: No fever  EYES: no acute visual disturbances  NECK: No pain or stiffness  RESPIRATORY: No cough; No shortness of breath  CARDIOVASCULAR: No chest pain, no palpitations  GASTROINTESTINAL: No pain. No nausea, vomiting or diarrhea   NEUROLOGICAL: No headache or numbness, no tremors  MUSCULOSKELETAL: No joint pain, no muscle pain  GENITOURINARY: no dysuria, no frequency, no hesitancy  PSYCHIATRY: no depression, no anxiety  ALL OTHER  ROS negative        Vital Signs Last 24 Hrs  T(C): 36.5 (08 Jun 2021 13:25), Max: 37.2 (07 Jun 2021 20:46)  T(F): 97.7 (08 Jun 2021 13:25), Max: 99 (07 Jun 2021 20:46)  HR: 65 (08 Jun 2021 13:25) (65 - 76)  BP: 125/63 (08 Jun 2021 13:25) (121/76 - 143/85)  BP(mean): --  RR: 18 (08 Jun 2021 13:25) (18 - 18)  SpO2: 96% (08 Jun 2021 13:25) (96% - 98%)    ________________________________________________  PHYSICAL EXAM:    GENERAL: NAD  HEENT: Normocephalic; conjunctivae and sclerae clear;  NECK : supple, no JVD  CHEST/LUNG: Clear to auscultation  HEART: S1 S2  regular  ABDOMEN: Soft, Nontender, Nondistended; Bowel sounds present  EXTREMITIES: no cyanosis; no LE edema; no calf tenderness  SKIN: warm and dry  NERVOUS SYSTEM:  Alert; no new deficits    _________________________________________________  CURRENT MEDICATIONS:    MEDICATIONS  (STANDING):  carbidopa/levodopa/entacapone 12.5/50/200 1 Tablet(s) Oral three times a day  dextrose 5%. 1000 milliLiter(s) (60 mL/Hr) IV Continuous <Continuous>  enalapril 20 milliGRAM(s) Oral two times a day  insulin lispro (ADMELOG) corrective regimen sliding scale   SubCutaneous Before meals and at bedtime  oxybutynin XL 5 milliGRAM(s) Oral daily  pantoprazole    Tablet 40 milliGRAM(s) Oral before breakfast  rasagiline Tablet 1 milliGRAM(s) Oral daily  senna 2 Tablet(s) Oral at bedtime    MEDICATIONS  (PRN):  acetaminophen   Tablet .. 650 milliGRAM(s) Oral every 6 hours PRN Temp greater or equal to 38C (100.4F), Moderate Pain (4 - 6)  ALBUTerol    90 MICROgram(s) HFA Inhaler 2 Puff(s) Inhalation every 6 hours PRN Shortness of Breath and/or Wheezing  ondansetron Injectable 4 milliGRAM(s) IV Push every 6 hours PRN Nausea and/or Vomiting  polyethylene glycol 3350 17 Gram(s) Oral daily PRN Constipation      __________________________________________________  LABS:                          9.0    7.59  )-----------( 242      ( 08 Jun 2021 13:45 )             27.6     06-08    141  |  110<H>  |  6<L>  ----------------------------<  107<H>  3.7   |  24  |  0.67    Ca    8.5      08 Jun 2021 06:08  Phos  3.6     06-08  Mg     1.8     06-08    TPro  5.0<L>  /  Alb  2.1<L>  /  TBili  0.5  /  DBili  x   /  AST  17  /  ALT  7<L>  /  AlkPhos  88  06-07    PTT - ( 08 Jun 2021 13:45 )  PTT:38.8 sec    CAPILLARY BLOOD GLUCOSE      POCT Blood Glucose.: 132 mg/dL (08 Jun 2021 12:24)  POCT Blood Glucose.: 131 mg/dL (08 Jun 2021 07:44)  POCT Blood Glucose.: 140 mg/dL (07 Jun 2021 22:51)  POCT Blood Glucose.: 140 mg/dL (07 Jun 2021 16:52)      __________________________________________________  RADIOLOGY & ADDITIONAL TESTS:    Imaging Personally Reviewed:  YES    < from: CT Abdomen and Pelvis w/ IV Cont (06.01.21 @ 10:11) >  PROCEDURE:  CT of the Abdomen and Pelvis was performed.  Sagittal and coronal reformats were performed.    FINDINGS:  LOWER CHEST: Clear lung bases.    LIVER: Multiple subcentimeter hepatic hypodensities, too small to further characterize.  BILE DUCTS: Normal caliber.  GALLBLADDER: Cholelithiasis.  SPLEEN: Within normal limits.  PANCREAS: Mildly atrophic.  ADRENALS: Within normal limits.  KIDNEYS/URETERS: Subcentimeter renal hypodensities, too small to further characterize. No hydronephrosis.    BLADDER: Within normal limits.  REPRODUCTIVE ORGANS: Uterus and left adnexa within normal limits. 3.7 x 2.8 cm right adnexal mass.    BOWEL: No bowel obstruction. Appendix is normal. Colonic diverticulosis. Rectal wall thickening.  PERITONEUM: Trace pelvic fluid.  VESSELS: Atherosclerotic changes. Filling defects noted within the left common femoral, femoral, and deep femoral veins.  RETROPERITONEUM/LYMPH NODES: No lymphadenopathy.  ABDOMINAL WALL: Small fat-containing left inguinal hernia.  BONES: Degenerative changes. Severe compression deformity of L2. Fixation cement noted within the L2 vertebral body. Mild wedge deformity of T11.    IMPRESSION:    1. Rectal wall thickening, question proctitis.  2. Colonic diverticulosis without CT evidence of acute diverticulitis. No bowel obstruction.  3. Right adnexal mass (3.7 x 2.8 cm). Recommend pelvic ultrasound for further assessment.  4. Deep venous thrombosis within the imaged left lower extremity (left common femoral, femoral, and deep femoral veins).    < end of copied text >    Consultant(s) Notes Reviewed:   YES     Plan of care was discussed with patient and /or primary care giver; all questions and concerns were addressed and care was aligned with patient's wishes.    Plan discussed with attending and consulting physicians.

## 2021-06-08 NOTE — PROGRESS NOTE ADULT - PROBLEM SELECTOR PLAN 4
CT noted above  Cancer marker 125: 10, GI Ca marker 19-9: <2  Follow up for transvaginal u/s  Dr. Yan, Heme/Onc, following

## 2021-06-08 NOTE — PROGRESS NOTE ADULT - SUBJECTIVE AND OBJECTIVE BOX
less awake  no bleeding  no fever    MEDICATIONS  (STANDING):  carbidopa/levodopa/entacapone 12.5/50/200 1 Tablet(s) Oral three times a day  dextrose 5%. 1000 milliLiter(s) (60 mL/Hr) IV Continuous <Continuous>  enalapril 20 milliGRAM(s) Oral two times a day  heparin  Infusion 400 Unit(s)/Hr (4 mL/Hr) IV Continuous <Continuous>  insulin lispro (ADMELOG) corrective regimen sliding scale   SubCutaneous Before meals and at bedtime  oxybutynin XL 5 milliGRAM(s) Oral daily  pantoprazole  Injectable 40 milliGRAM(s) IV Push two times a day  rasagiline Tablet 1 milliGRAM(s) Oral daily  senna 2 Tablet(s) Oral at bedtime    MEDICATIONS  (PRN):  acetaminophen   Tablet .. 650 milliGRAM(s) Oral every 6 hours PRN Temp greater or equal to 38C (100.4F), Moderate Pain (4 - 6)  ALBUTerol    90 MICROgram(s) HFA Inhaler 2 Puff(s) Inhalation every 6 hours PRN Shortness of Breath and/or Wheezing  ondansetron Injectable 4 milliGRAM(s) IV Push every 6 hours PRN Nausea and/or Vomiting  polyethylene glycol 3350 17 Gram(s) Oral daily PRN Constipation      Allergies    No Known Allergies    Intolerances        Vital Signs Last 24 Hrs  T(C): 36.6 (08 Jun 2021 05:37), Max: 37.2 (07 Jun 2021 20:46)  T(F): 97.9 (08 Jun 2021 05:37), Max: 99 (07 Jun 2021 20:46)  HR: 66 (08 Jun 2021 05:37) (66 - 76)  BP: 143/85 (08 Jun 2021 05:37) (121/76 - 143/85)  BP(mean): --  RR: 18 (08 Jun 2021 05:37) (18 - 18)  SpO2: 97% (08 Jun 2021 05:37) (97% - 98%)    PHYSICAL EXAM  General: adult in NAD  HEENT: clear oropharynx, anicteric sclera, pink conjunctiva  Neck: supple  CV: normal S1/S2 with no murmur rubs or gallops  Lungs: positive air movement b/l ant lungs,clear to auscultation, no wheezes, no rales  Abdomen: soft non-tender non-distended, no hepatosplenomegaly  Ext: no clubbing cyanosis or edema  Skin: no rashes and no petechiae  Neuro: alert and oriented X 4, no focal deficits  LABS:                          8.5    6.21  )-----------( 246      ( 08 Jun 2021 06:08 )             26.2         Mean Cell Volume : 86.8 fl  Mean Cell Hemoglobin : 28.1 pg  Mean Cell Hemoglobin Concentration : 32.4 gm/dL  Auto Neutrophil # : x  Auto Lymphocyte # : x  Auto Monocyte # : x  Auto Eosinophil # : x  Auto Basophil # : x  Auto Neutrophil % : x  Auto Lymphocyte % : x  Auto Monocyte % : x  Auto Eosinophil % : x  Auto Basophil % : x    Serial CBC  Hematocrit 26.2  Hemoglobin 8.5  Plat 246  RBC 3.02  WBC 6.21  Serial CBC  Hematocrit 27.7  Hemoglobin 9.3  Plat 260  RBC 3.26  WBC 7.39  Serial CBC  Hematocrit 28.6  Hemoglobin 9.5  Plat 247  RBC 3.37  WBC 7.28  Serial CBC  Hematocrit 28.8  Hemoglobin 9.6  Plat 254  RBC 3.38  WBC 6.43  Serial CBC  Hematocrit 26.9  Hemoglobin 8.9  Plat 223  RBC 3.16  WBC 6.81  Serial CBC  Hematocrit 22.4  Hemoglobin 7.4  Plat 239  RBC 2.59  WBC 6.07  Serial CBC  Hematocrit 27.4  Hemoglobin 8.6  Plat 232  RBC 3.14  WBC 6.57    06-08    141  |  110<H>  |  6<L>  ----------------------------<  107<H>  3.7   |  24  |  0.67    Ca    8.5      08 Jun 2021 06:08  Phos  3.6     06-08  Mg     1.8     06-08    TPro  5.0<L>  /  Alb  2.1<L>  /  TBili  0.5  /  DBili  x   /  AST  17  /  ALT  7<L>  /  AlkPhos  88  06-07      PTT - ( 08 Jun 2021 03:56 )  PTT:56.3 sec    Ferritin, Serum: 451 ng/mL (06-02 @ 09:41)  Folate, Serum: 8.1 ng/mL (06-02 @ 09:41)  Vitamin B12, Serum: 445 pg/mL (06-02 @ 09:41)  Iron - Total Binding Capacity.: 169 ug/dL (06-02 @ 06:23)            BLOOD SMEAR INTERPRETATION:       RADIOLOGY & ADDITIONAL STUDIES:

## 2021-06-08 NOTE — DISCHARGE NOTE PROVIDER - NSDCCPCAREPLAN_GEN_ALL_CORE_FT
PRINCIPAL DISCHARGE DIAGNOSIS  Diagnosis: Rectal bleeding  Assessment and Plan of Treatment:       SECONDARY DISCHARGE DIAGNOSES  Diagnosis: Adnexal mass  Assessment and Plan of Treatment:      PRINCIPAL DISCHARGE DIAGNOSIS  Diagnosis: Rectal bleeding  Assessment and Plan of Treatment: You prsented with. you were seen by gastroentorogist  and colonoscopy was done, was found to have rectal ulcer with 2 clips. Please avoid NSAIDS such as ibuprofen and motrin. Please continue to take PPI and follow up with your PCP  Symptoms to report, bleeding, palpitations, fatigue, pale skin, cold skin, dizziness. Take medications as ordered by PCP        SECONDARY DISCHARGE DIAGNOSES  Diagnosis: Diabetes mellitus  Assessment and Plan of Treatment: HgA1C this admission 5.7  Make sure you get your HgA1c checked every three months.  If you take oral diabetes medications, check your blood glucose two times a day.  If you take insulin, check your blood glucose before meals and at bedtime.  It's important not to skip any meals.  Keep a log of your blood glucose results and always take it with you to your doctor appointments.  Keep a list of your current medications including injectables and over the counter medications and bring this medication list with you to all your doctor appointments.  If you have not seen your ophthalmologist this year call for appointment.  Check your feet daily for redness, sores, or openings. Do not self treat. If no improvement in two days call your primary care physician for an appointment.  Low blood sugar (hypoglycemia) is a blood sugar below 70mg/dl. Check your blood sugar if you feel signs/symptoms of hypoglycemia. If your blood sugar is below 70 take 15 grams of carbohydrates (ex 4 oz of apple juice, 3-4 glucose tablets, or 4-6 oz of regular soda) wait 15 minutes and repeat blood sugar to make sure it comes up above 70.  If your blood sugar is above 70 and you are due for a meal, have a meal.  If you are not due for a meal have a snack.  This snack helps keeps your blood sugar at a safe range.      Diagnosis: Adnexal mass  Assessment and Plan of Treatment: Ultrasond showed that you have uterine fibroid, tumor markers were  negative. YOu were followed by Heme/Oncolgy       Diagnosis: HTN (hypertension)  Assessment and Plan of Treatment: Low salt diet  Activity as tolerated.  Take all medication as prescribed.  Follow up with your medical doctor for routine blood pressure monitoring at your next visit.  Notify your doctor if you have any of the following symptoms:   Dizziness, Lightheadedness, Blurry vision, Headache, Chest pain, Shortness of breath      Diagnosis: Deep vein thrombosis (DVT)  Assessment and Plan of Treatment: You were found to have blood clot on Right lower extremties on ultrasound duplex . you had  IVC filter placement for DVT on 6/10, as you cannot tolerate anticoagulant in the setting of gastrointestinal bleeding. You were followed by hematology and oncology while you were here.

## 2021-06-08 NOTE — PROGRESS NOTE ADULT - TIME BILLING
patient with dvt , gi bleeding , ftt  patient improving  h/h holding , no acute  bleeding patient with dvt , gi bleeding , ftt  patient improving  h/h holding , no acute  bleeding    care pln d/w np and hematology

## 2021-06-08 NOTE — DISCHARGE NOTE PROVIDER - PROVIDER TOKENS
PROVIDER:[TOKEN:[[id number]],FOLLOWUP:[1-3 days]] PROVIDER:[TOKEN:[[id number]],FOLLOWUP:[1-3 days]],PROVIDER:[TOKEN:[[id number]],FOLLOWUP:[2 weeks]],PROVIDER:[TOKEN:[[id number]],FOLLOWUP:[2 weeks]]

## 2021-06-08 NOTE — PROGRESS NOTE ADULT - SUBJECTIVE AND OBJECTIVE BOX
[   ] ICU                                          [   ] CCU                                      [ X  ] Medical Floor    Patient is a 75 year old with rectal bleeding. GI consulted to evaluate.      Patient is a 75 year old female from [place], with past medical history significant for Parkinson's, COPD, diverticulosis, HTN, DM and HLD, who presented to the ED due to rectal bleed. History is limited from patient due to her being a poor historian. As per ED provider note, patient was sent to the ED for concern for vaginal bleed. In ED, patient is noted to have rectal bleed instead of vaginal bleed. Patient is confused unable to provide any history. No abdominal pain, nausea, vomiting, hematemesis, melena, fever, chills, chest pain, SOB, cough, hematuria, dysuria or diarrhea reported.       Today patient appears comfortable with no new complaints. No abdominal pain, N/V, hematemesis, hematochezia, melena, fever, chills, chest pain, SOB, cough or diarrhea reported.    PAIN MANAGEMENT:  Pain Scale:                0/10  Pain Location:      Prior Colonoscopy: Unknown    PAST MEDICAL HISTORY  Parkinson disease  COPD  Diverticulosis  HTN (hypertension)  Diabetes mellitus  HLD (hyperlipidemia)      PAST SURGICAL HISTORY  No significant past surgical history      Allergies    No Known Allergies    Intolerances  None        SOCIAL HISTORY  Advanced Directives:       [ X ] Full Code       [  ] DNR  Marital Status:         [  ] M      [ X ] S      [  ] D       [  ] W  Children:       [ X ] Yes      [  ] No  Occupation:        [  ] Employed       [ X ] Unemployed       [  ] Retired  Diet:       [ X ] Regular       [  ] PEG feeding          [  ] NG tube feeding  Drug Use:           [ X ] Patient denied          [  ] Yes  Alcohol:           [ X ] No             [  ] Yes (socially)         [  ] Yes (chronic)  Tobacco:           [  ] Yes           [ X ] No    FAMILY HISTORY  [ X ] Heart Disease            [ X ] Diabetes             [ X ] HTN             [  ] Colon Cancer             [  ] Stomach Cancer              [  ] Pancreatic Cancer        VITALS  Vital Signs Last 24 Hrs  T(C): 36.5 (08 Jun 2021 13:25), Max: 37.2 (07 Jun 2021 20:46)  T(F): 97.7 (08 Jun 2021 13:25), Max: 99 (07 Jun 2021 20:46)  HR: 65 (08 Jun 2021 13:25) (65 - 76)  BP: 125/63 (08 Jun 2021 13:25) (121/76 - 143/85)   RR: 18 (08 Jun 2021 13:25) (18 - 18)  SpO2: 96% (08 Jun 2021 13:25) (96% - 98%)       MEDICATIONS  (STANDING):  carbidopa/levodopa/entacapone 12.5/50/200 1 Tablet(s) Oral three times a day  dextrose 5%. 1000 milliLiter(s) (60 mL/Hr) IV Continuous <Continuous>  enalapril 20 milliGRAM(s) Oral two times a day  heparin  Infusion 400 Unit(s)/Hr (4 mL/Hr) IV Continuous <Continuous>  insulin lispro (ADMELOG) corrective regimen sliding scale   SubCutaneous Before meals and at bedtime  oxybutynin XL 5 milliGRAM(s) Oral daily  pantoprazole    Tablet 40 milliGRAM(s) Oral before breakfast  rasagiline Tablet 1 milliGRAM(s) Oral daily  senna 2 Tablet(s) Oral at bedtime    MEDICATIONS  (PRN):  acetaminophen   Tablet .. 650 milliGRAM(s) Oral every 6 hours PRN Temp greater or equal to 38C (100.4F), Moderate Pain (4 - 6)  ALBUTerol    90 MICROgram(s) HFA Inhaler 2 Puff(s) Inhalation every 6 hours PRN Shortness of Breath and/or Wheezing  ondansetron Injectable 4 milliGRAM(s) IV Push every 6 hours PRN Nausea and/or Vomiting  polyethylene glycol 3350 17 Gram(s) Oral daily PRN Constipation                            9.0    7.59  )-----------( 242      ( 08 Jun 2021 13:45 )             27.6       06-08    141  |  110<H>  |  6<L>  ----------------------------<  107<H>  3.7   |  24  |  0.67    Ca    8.5      08 Jun 2021 06:08  Phos  3.6     06-08  Mg     1.8     06-08    TPro  5.0<L>  /  Alb  2.1<L>  /  TBili  0.5  /  DBili  x   /  AST  17  /  ALT  7<L>  /  AlkPhos  88  06-07      PTT - ( 08 Jun 2021 13:45 )  PTT:38.8 sec

## 2021-06-08 NOTE — DISCHARGE NOTE PROVIDER - CARE PROVIDER_API CALL
Art Diez  INTERNAL MEDICINE  [address], [place] [address]  Phone: [phone]  Fax: [phone]  Follow Up Time: 1-3 days   Art Diez  INTERNAL MEDICINE  [address] [address]  Phone: [phone]  Fax: [phone]  Follow Up Time: 1-3 days    Pepper Yan  INTERNAL MEDICINE  [address] [place] [address]  Phone: [phone]  Fax: [phone]  Follow Up Time: 2 weeks    Mike Galvez)  Medicine  [address] [facility], [address] [place] [address]  Phone: [phone]  Fax: [phone]  Follow Up Time: 2 weeks

## 2021-06-08 NOTE — DISCHARGE NOTE PROVIDER - NSDCHHNEEDSERVICE_GEN_ALL_CORE
Rehabilitation services Medication teaching and assessment/Rehabilitation services/Teaching and training

## 2021-06-09 LAB
ANION GAP SERPL CALC-SCNC: 9 MMOL/L — SIGNIFICANT CHANGE UP (ref 5–17)
APPEARANCE UR: ABNORMAL
BACTERIA # UR AUTO: ABNORMAL /HPF
BILIRUB UR-MCNC: NEGATIVE — SIGNIFICANT CHANGE UP
BUN SERPL-MCNC: 7 MG/DL — SIGNIFICANT CHANGE UP (ref 7–18)
CALCIUM SERPL-MCNC: 8.7 MG/DL — SIGNIFICANT CHANGE UP (ref 8.4–10.5)
CHLORIDE SERPL-SCNC: 110 MMOL/L — HIGH (ref 96–108)
CO2 SERPL-SCNC: 23 MMOL/L — SIGNIFICANT CHANGE UP (ref 22–31)
COLOR SPEC: YELLOW — SIGNIFICANT CHANGE UP
CREAT SERPL-MCNC: 0.62 MG/DL — SIGNIFICANT CHANGE UP (ref 0.5–1.3)
DIFF PNL FLD: ABNORMAL
EPI CELLS # UR: SIGNIFICANT CHANGE UP /HPF
GLUCOSE BLDC GLUCOMTR-MCNC: 108 MG/DL — HIGH (ref 70–99)
GLUCOSE BLDC GLUCOMTR-MCNC: 134 MG/DL — HIGH (ref 70–99)
GLUCOSE BLDC GLUCOMTR-MCNC: 154 MG/DL — HIGH (ref 70–99)
GLUCOSE BLDC GLUCOMTR-MCNC: 192 MG/DL — HIGH (ref 70–99)
GLUCOSE SERPL-MCNC: 96 MG/DL — SIGNIFICANT CHANGE UP (ref 70–99)
GLUCOSE UR QL: NEGATIVE — SIGNIFICANT CHANGE UP
HCT VFR BLD CALC: 28 % — LOW (ref 34.5–45)
HGB BLD-MCNC: 8.9 G/DL — LOW (ref 11.5–15.5)
KETONES UR-MCNC: NEGATIVE — SIGNIFICANT CHANGE UP
LEUKOCYTE ESTERASE UR-ACNC: ABNORMAL
MAGNESIUM SERPL-MCNC: 1.9 MG/DL — SIGNIFICANT CHANGE UP (ref 1.6–2.6)
MCHC RBC-ENTMCNC: 27.9 PG — SIGNIFICANT CHANGE UP (ref 27–34)
MCHC RBC-ENTMCNC: 31.8 GM/DL — LOW (ref 32–36)
MCV RBC AUTO: 87.8 FL — SIGNIFICANT CHANGE UP (ref 80–100)
NITRITE UR-MCNC: NEGATIVE — SIGNIFICANT CHANGE UP
NRBC # BLD: 0 /100 WBCS — SIGNIFICANT CHANGE UP (ref 0–0)
PH UR: 7 — SIGNIFICANT CHANGE UP (ref 5–8)
PHOSPHATE SERPL-MCNC: 3.2 MG/DL — SIGNIFICANT CHANGE UP (ref 2.5–4.5)
PLATELET # BLD AUTO: 240 K/UL — SIGNIFICANT CHANGE UP (ref 150–400)
POTASSIUM SERPL-MCNC: 3.7 MMOL/L — SIGNIFICANT CHANGE UP (ref 3.5–5.3)
POTASSIUM SERPL-SCNC: 3.7 MMOL/L — SIGNIFICANT CHANGE UP (ref 3.5–5.3)
PROT UR-MCNC: 100
RBC # BLD: 3.19 M/UL — LOW (ref 3.8–5.2)
RBC # FLD: 16.6 % — HIGH (ref 10.3–14.5)
RBC CASTS # UR COMP ASSIST: ABNORMAL /HPF (ref 0–2)
SARS-COV-2 RNA SPEC QL NAA+PROBE: SIGNIFICANT CHANGE UP
SODIUM SERPL-SCNC: 142 MMOL/L — SIGNIFICANT CHANGE UP (ref 135–145)
SP GR SPEC: 1.01 — SIGNIFICANT CHANGE UP (ref 1.01–1.02)
UROBILINOGEN FLD QL: NEGATIVE — SIGNIFICANT CHANGE UP
WBC # BLD: 7.39 K/UL — SIGNIFICANT CHANGE UP (ref 3.8–10.5)
WBC # FLD AUTO: 7.39 K/UL — SIGNIFICANT CHANGE UP (ref 3.8–10.5)
WBC UR QL: >50 /HPF (ref 0–5)

## 2021-06-09 PROCEDURE — 76856 US EXAM PELVIC COMPLETE: CPT | Mod: 26

## 2021-06-09 RX ORDER — SODIUM CHLORIDE 9 MG/ML
1000 INJECTION, SOLUTION INTRAVENOUS
Refills: 0 | Status: DISCONTINUED | OUTPATIENT
Start: 2021-06-09 | End: 2021-06-14

## 2021-06-09 RX ADMIN — RASAGILINE 1 MILLIGRAM(S): 0.5 TABLET ORAL at 11:34

## 2021-06-09 RX ADMIN — Medication 20 MILLIGRAM(S): at 05:20

## 2021-06-09 RX ADMIN — PANTOPRAZOLE SODIUM 40 MILLIGRAM(S): 20 TABLET, DELAYED RELEASE ORAL at 05:20

## 2021-06-09 RX ADMIN — Medication 20 MILLIGRAM(S): at 17:32

## 2021-06-09 RX ADMIN — CARBIDOPA, LEVODOPA, AND ENTACAPONE 1 TABLET(S): 50; 200; 200 TABLET, FILM COATED ORAL at 13:40

## 2021-06-09 RX ADMIN — SENNA PLUS 2 TABLET(S): 8.6 TABLET ORAL at 21:43

## 2021-06-09 RX ADMIN — Medication 1: at 21:42

## 2021-06-09 RX ADMIN — CARBIDOPA, LEVODOPA, AND ENTACAPONE 1 TABLET(S): 50; 200; 200 TABLET, FILM COATED ORAL at 21:43

## 2021-06-09 RX ADMIN — CARBIDOPA, LEVODOPA, AND ENTACAPONE 1 TABLET(S): 50; 200; 200 TABLET, FILM COATED ORAL at 05:21

## 2021-06-09 RX ADMIN — Medication 5 MILLIGRAM(S): at 11:34

## 2021-06-09 RX ADMIN — SODIUM CHLORIDE 60 MILLILITER(S): 9 INJECTION, SOLUTION INTRAVENOUS at 12:46

## 2021-06-09 NOTE — PROGRESS NOTE ADULT - PROBLEM SELECTOR PLAN 4
CT  Right adnexal mass (3.7 x 2.8 cm)  Cancer marker 125: 10, GI Ca marker 19-9: <2  US Pelvis 3.6 cm right uterine fibroid. Patient required Sheppard and full bladder for test   Sheppard was d/c'd post test  Continue to monitor with bladder scans for urinary retention  Urine noted for possible UTI - UA sent   Dr. Yan, Heme/Onc, following

## 2021-06-09 NOTE — PROGRESS NOTE ADULT - SUBJECTIVE AND OBJECTIVE BOX
bleeding again  H/H stable  awake    MEDICATIONS  (STANDING):  carbidopa/levodopa/entacapone 12.5/50/200 1 Tablet(s) Oral three times a day  dextrose 5%. 1000 milliLiter(s) (60 mL/Hr) IV Continuous <Continuous>  enalapril 20 milliGRAM(s) Oral two times a day  insulin lispro (ADMELOG) corrective regimen sliding scale   SubCutaneous Before meals and at bedtime  oxybutynin XL 5 milliGRAM(s) Oral daily  pantoprazole    Tablet 40 milliGRAM(s) Oral before breakfast  rasagiline Tablet 1 milliGRAM(s) Oral daily  senna 2 Tablet(s) Oral at bedtime    MEDICATIONS  (PRN):  acetaminophen   Tablet .. 650 milliGRAM(s) Oral every 6 hours PRN Temp greater or equal to 38C (100.4F), Moderate Pain (4 - 6)  ALBUTerol    90 MICROgram(s) HFA Inhaler 2 Puff(s) Inhalation every 6 hours PRN Shortness of Breath and/or Wheezing  ondansetron Injectable 4 milliGRAM(s) IV Push every 6 hours PRN Nausea and/or Vomiting  polyethylene glycol 3350 17 Gram(s) Oral daily PRN Constipation      Allergies    No Known Allergies    Intolerances        Vital Signs Last 24 Hrs  T(C): 36.4 (09 Jun 2021 05:18), Max: 36.8 (08 Jun 2021 19:23)  T(F): 97.6 (09 Jun 2021 05:18), Max: 98.2 (08 Jun 2021 19:23)  HR: 67 (09 Jun 2021 05:18) (65 - 73)  BP: 119/72 (09 Jun 2021 05:18) (119/72 - 142/95)  BP(mean): --  RR: 18 (09 Jun 2021 05:18) (18 - 19)  SpO2: 99% (09 Jun 2021 05:18) (96% - 99%)    PHYSICAL EXAM  General: adult in NAD  HEENT: clear oropharynx, anicteric sclera, pink conjunctiva  Neck: supple  CV: normal S1/S2 with no murmur rubs or gallops  Lungs: positive air movement b/l ant lungs,clear to auscultation, no wheezes, no rales  Abdomen: soft non-tender non-distended, no hepatosplenomegaly  Ext: no clubbing cyanosis or edema  Skin: no rashes and no petechiae  Neuro: alert and oriented X 4, no focal deficits  LABS:                          8.9    7.39  )-----------( 240      ( 09 Jun 2021 06:24 )             28.0         Mean Cell Volume : 87.8 fl  Mean Cell Hemoglobin : 27.9 pg  Mean Cell Hemoglobin Concentration : 31.8 gm/dL  Auto Neutrophil # : x  Auto Lymphocyte # : x  Auto Monocyte # : x  Auto Eosinophil # : x  Auto Basophil # : x  Auto Neutrophil % : x  Auto Lymphocyte % : x  Auto Monocyte % : x  Auto Eosinophil % : x  Auto Basophil % : x    Serial CBC  Hematocrit 28.0  Hemoglobin 8.9  Plat 240  RBC 3.19  WBC 7.39  Serial CBC  Hematocrit 27.6  Hemoglobin 9.0  Plat 242  RBC 3.21  WBC 7.59  Serial CBC  Hematocrit 26.2  Hemoglobin 8.5  Plat 246  RBC 3.02  WBC 6.21  Serial CBC  Hematocrit 27.7  Hemoglobin 9.3  Plat 260  RBC 3.26  WBC 7.39  Serial CBC  Hematocrit 28.6  Hemoglobin 9.5  Plat 247  RBC 3.37  WBC 7.28  Serial CBC  Hematocrit 28.8  Hemoglobin 9.6  Plat 254  RBC 3.38  WBC 6.43  Serial CBC  Hematocrit 26.9  Hemoglobin 8.9  Plat 223  RBC 3.16  WBC 6.81    06-09    142  |  110<H>  |  7   ----------------------------<  96  3.7   |  23  |  0.62    Ca    8.7      09 Jun 2021 06:24  Phos  3.2     06-09  Mg     1.9     06-09        PTT - ( 08 Jun 2021 13:45 )  PTT:38.8 sec              BLOOD SMEAR INTERPRETATION:       RADIOLOGY & ADDITIONAL STUDIES:

## 2021-06-09 NOTE — PROGRESS NOTE ADULT - SUBJECTIVE AND OBJECTIVE BOX
[   ] ICU                                          [   ] CCU                                      [  X ] Medical Floor    Patient is a 75 year old with rectal bleeding. GI consulted to evaluate.      Patient is a 75 year old female from [place], with past medical history significant for Parkinson's, COPD, diverticulosis, HTN, DM and HLD, who presented to the ED due to rectal bleed. History is limited from patient due to her being a poor historian. As per ED provider note, patient was sent to the ED for concern for vaginal bleed. In ED, patient is noted to have rectal bleed instead of vaginal bleed. Patient is confused unable to provide any history. No abdominal pain, nausea, vomiting, hematemesis, melena, fever, chills, chest pain, SOB, cough, hematuria, dysuria or diarrhea reported.       Today patient appears comfortable with no new complaints. No abdominal pain, N/V, hematemesis, hematochezia, melena, fever, chills, chest pain, SOB, cough or diarrhea reported.    PAIN MANAGEMENT:  Pain Scale:                0/10  Pain Location:      Prior Colonoscopy: Unknown    PAST MEDICAL HISTORY  Parkinson disease  COPD  Diverticulosis  HTN (hypertension)  Diabetes mellitus  HLD (hyperlipidemia)      PAST SURGICAL HISTORY  No significant past surgical history      Allergies    No Known Allergies    Intolerances  None        SOCIAL HISTORY  Advanced Directives:       [ X ] Full Code       [  ] DNR  Marital Status:         [  ] M      [ X ] S      [  ] D       [  ] W  Children:       [ X ] Yes      [  ] No  Occupation:        [  ] Employed       [ X ] Unemployed       [  ] Retired  Diet:       [ X ] Regular       [  ] PEG feeding          [  ] NG tube feeding  Drug Use:           [ X ] Patient denied          [  ] Yes  Alcohol:           [ X ] No             [  ] Yes (socially)         [  ] Yes (chronic)  Tobacco:           [  ] Yes           [ X ] No    FAMILY HISTORY  [ X ] Heart Disease            [ X ] Diabetes             [ X ] HTN             [  ] Colon Cancer             [  ] Stomach Cancer              [  ] Pancreatic Cancer        VITALS  Vital Signs Last 24 Hrs  T(C): 36.9 (09 Jun 2021 13:20), Max: 36.9 (09 Jun 2021 13:20)  T(F): 98.5 (09 Jun 2021 13:20), Max: 98.5 (09 Jun 2021 13:20)  HR: 77 (09 Jun 2021 13:20) (67 - 77)  BP: 144/62 (09 Jun 2021 13:20) (119/72 - 144/62)   RR: 18 (09 Jun 2021 13:20) (18 - 19)  SpO2: 100% (09 Jun 2021 13:20) (98% - 100%)       MEDICATIONS  (STANDING):  carbidopa/levodopa/entacapone 12.5/50/200 1 Tablet(s) Oral three times a day  dextrose 5%. 1000 milliLiter(s) (60 mL/Hr) IV Continuous <Continuous>  enalapril 20 milliGRAM(s) Oral two times a day  insulin lispro (ADMELOG) corrective regimen sliding scale   SubCutaneous Before meals and at bedtime  oxybutynin XL 5 milliGRAM(s) Oral daily  pantoprazole    Tablet 40 milliGRAM(s) Oral before breakfast  rasagiline Tablet 1 milliGRAM(s) Oral daily  senna 2 Tablet(s) Oral at bedtime    MEDICATIONS  (PRN):  acetaminophen   Tablet .. 650 milliGRAM(s) Oral every 6 hours PRN Temp greater or equal to 38C (100.4F), Moderate Pain (4 - 6)  ALBUTerol    90 MICROgram(s) HFA Inhaler 2 Puff(s) Inhalation every 6 hours PRN Shortness of Breath and/or Wheezing  ondansetron Injectable 4 milliGRAM(s) IV Push every 6 hours PRN Nausea and/or Vomiting  polyethylene glycol 3350 17 Gram(s) Oral daily PRN Constipation                            8.9    7.39  )-----------( 240      ( 09 Jun 2021 06:24 )             28.0       06-09    142  |  110<H>  |  7   ----------------------------<  96  3.7   |  23  |  0.62    Ca    8.7      09 Jun 2021 06:24  Phos  3.2     06-09  Mg     1.9     06-09        PTT - ( 08 Jun 2021 13:45 )  PTT:38.8 sec

## 2021-06-09 NOTE — PROGRESS NOTE ADULT - TIME BILLING
patient with dvt , gi bleeding , ftt  patient improving  h/h holding , after stoping iv heparin, had bleeding again yesterday  for ivc filter   most likely tomorrow as per ir attending  care pln d/w np and hematology, and patient's family.

## 2021-06-09 NOTE — PROGRESS NOTE ADULT - ASSESSMENT
· Assessment	  75 year old lady presented with rectal bleeding.  CT showed rectal thickening, adnexal mass, and DVT.    1. rectal bleeding. will do sigmoidoscopy  please give her enema  discussed with Dr. Leonor herr prep  large hemorrhoid, and rectl ulcer  cauterized    2. adnexal mass  will do transvaginal ultrasound  check ca 125    3. DVT  not on AC yet due to rectal bleeding  for sigmoidoscopy  she has rectal bleeding again  need IVC filter  IR said it can not be done  but right femoral vein is clear  vascular to follow  rectal ulcer cauterized  will begin heparin with low bolus  bleeding started on heparin  will dc hepatin  for IVC filter  please call vascular

## 2021-06-09 NOTE — PROGRESS NOTE ADULT - SUBJECTIVE AND OBJECTIVE BOX
NP Note discussed with  Primary Attending    Patient is a 75y old  Female who presents with a chief complaint of rectal bleed (09 Jun 2021 16:03)      INTERVAL HPI/OVERNIGHT EVENTS: no new complaints    MEDICATIONS  (STANDING):  carbidopa/levodopa/entacapone 12.5/50/200 1 Tablet(s) Oral three times a day  dextrose 5%. 1000 milliLiter(s) (60 mL/Hr) IV Continuous <Continuous>  enalapril 20 milliGRAM(s) Oral two times a day  insulin lispro (ADMELOG) corrective regimen sliding scale   SubCutaneous Before meals and at bedtime  oxybutynin XL 5 milliGRAM(s) Oral daily  pantoprazole    Tablet 40 milliGRAM(s) Oral before breakfast  rasagiline Tablet 1 milliGRAM(s) Oral daily  senna 2 Tablet(s) Oral at bedtime    MEDICATIONS  (PRN):  acetaminophen   Tablet .. 650 milliGRAM(s) Oral every 6 hours PRN Temp greater or equal to 38C (100.4F), Moderate Pain (4 - 6)  ALBUTerol    90 MICROgram(s) HFA Inhaler 2 Puff(s) Inhalation every 6 hours PRN Shortness of Breath and/or Wheezing  ondansetron Injectable 4 milliGRAM(s) IV Push every 6 hours PRN Nausea and/or Vomiting  polyethylene glycol 3350 17 Gram(s) Oral daily PRN Constipation      __________________________________________________  REVIEW OF SYSTEMS:    CONSTITUTIONAL: No fever,   EYES: no acute visual disturbances  NECK: No pain or stiffness  RESPIRATORY: No cough; No shortness of breath  CARDIOVASCULAR: No chest pain, no palpitations  GASTROINTESTINAL: No pain. No nausea or vomiting; No diarrhea   NEUROLOGICAL: No headache or numbness, no tremors  MUSCULOSKELETAL: No joint pain, no muscle pain  GENITOURINARY: no dysuria, no frequency, no hesitancy  PSYCHIATRY: no depression , no anxiety  ALL OTHER  ROS negative        Vital Signs Last 24 Hrs  T(C): 36.6 (09 Jun 2021 16:31), Max: 36.9 (09 Jun 2021 13:20)  T(F): 97.8 (09 Jun 2021 16:31), Max: 98.5 (09 Jun 2021 13:20)  HR: 76 (09 Jun 2021 16:31) (67 - 77)  BP: 126/70 (09 Jun 2021 16:31) (119/72 - 144/62)  BP(mean): --  RR: 18 (09 Jun 2021 16:31) (18 - 19)  SpO2: 100% (09 Jun 2021 16:31) (98% - 100%)    ________________________________________________  PHYSICAL EXAM:  GENERAL: NAD  HEENT: Normocephalic;  conjunctivae and sclerae clear; moist mucous membranes;   NECK : supple  CHEST/LUNG: Clear to auscultation bilaterally with good air entry   HEART: S1 S2  regular; no murmurs, gallops or rubs  ABDOMEN: Soft, Nontender, Nondistended; Bowel sounds present  EXTREMITIES: no cyanosis; no edema; no calf tenderness  SKIN: warm and dry; no rash  NERVOUS SYSTEM:  Awake and alert; Oriented  to place, person and time ; no new deficits    _________________________________________________  LABS:                        8.9    7.39  )-----------( 240      ( 09 Jun 2021 06:24 )             28.0     06-09    142  |  110<H>  |  7   ----------------------------<  96  3.7   |  23  |  0.62    Ca    8.7      09 Jun 2021 06:24  Phos  3.2     06-09  Mg     1.9     06-09      PTT - ( 08 Jun 2021 13:45 )  PTT:38.8 sec    CAPILLARY BLOOD GLUCOSE      POCT Blood Glucose.: 154 mg/dL (09 Jun 2021 12:21)  POCT Blood Glucose.: 108 mg/dL (09 Jun 2021 07:44)  POCT Blood Glucose.: 168 mg/dL (08 Jun 2021 21:07)  POCT Blood Glucose.: 139 mg/dL (08 Jun 2021 17:01)        RADIOLOGY & ADDITIONAL TESTS:    < from: CT Abdomen and Pelvis w/ IV Cont (06.01.21 @ 10:11) >  IMPRESSION:    1. Rectal wall thickening, question proctitis.  2. Colonic diverticulosis without CT evidence of acute diverticulitis. No bowel obstruction.  3. Right adnexal mass (3.7 x 2.8 cm). Recommend pelvic ultrasound for further assessment.  4. Deep venous thrombosis within the imaged left lower extremity (left common femoral, femoral, and deep femoral veins).      < end of copied text >    < from: US Pelvis Complete (US Pelvis Complete .) (06.09.21 @ 15:51) >  IMPRESSION:  3.6 cm right uterine fibroid.      < end of copied text >      Imaging Personally Reviewed:  YES/NO    Consultant(s) Notes Reviewed:   YES/ No    Care Discussed with Consultants :     Plan of care was discussed with patient and /or primary care giver; all questions and concerns were addressed and care was aligned with patient's wishes.     NP Note discussed with  Primary Attending    Patient is a 75y old  Female who presents with a chief complaint of rectal bleed (09 Jun 2021 16:03)      INTERVAL HPI/OVERNIGHT EVENTS: no new complaints    MEDICATIONS  (STANDING):  carbidopa/levodopa/entacapone 12.5/50/200 1 Tablet(s) Oral three times a day  dextrose 5%. 1000 milliLiter(s) (60 mL/Hr) IV Continuous <Continuous>  enalapril 20 milliGRAM(s) Oral two times a day  insulin lispro (ADMELOG) corrective regimen sliding scale   SubCutaneous Before meals and at bedtime  oxybutynin XL 5 milliGRAM(s) Oral daily  pantoprazole    Tablet 40 milliGRAM(s) Oral before breakfast  rasagiline Tablet 1 milliGRAM(s) Oral daily  senna 2 Tablet(s) Oral at bedtime    MEDICATIONS  (PRN):  acetaminophen   Tablet .. 650 milliGRAM(s) Oral every 6 hours PRN Temp greater or equal to 38C (100.4F), Moderate Pain (4 - 6)  ALBUTerol    90 MICROgram(s) HFA Inhaler 2 Puff(s) Inhalation every 6 hours PRN Shortness of Breath and/or Wheezing  ondansetron Injectable 4 milliGRAM(s) IV Push every 6 hours PRN Nausea and/or Vomiting  polyethylene glycol 3350 17 Gram(s) Oral daily PRN Constipation      __________________________________________________  REVIEW OF SYSTEMS:    CONSTITUTIONAL: No fever,   EYES: no acute visual disturbances  NECK: No pain or stiffness  RESPIRATORY: No cough; No shortness of breath  CARDIOVASCULAR: No chest pain, no palpitations  GASTROINTESTINAL: No pain. No nausea or vomiting; No diarrhea   NEUROLOGICAL: No headache or numbness, no tremors  MUSCULOSKELETAL: No joint pain, no muscle pain  GENITOURINARY: no dysuria, no frequency, no hesitancy  PSYCHIATRY: no depression , no anxiety  ALL OTHER  ROS negative        Vital Signs Last 24 Hrs  T(C): 36.6 (09 Jun 2021 16:31), Max: 36.9 (09 Jun 2021 13:20)  T(F): 97.8 (09 Jun 2021 16:31), Max: 98.5 (09 Jun 2021 13:20)  HR: 76 (09 Jun 2021 16:31) (67 - 77)  BP: 126/70 (09 Jun 2021 16:31) (119/72 - 144/62)  BP(mean): --  RR: 18 (09 Jun 2021 16:31) (18 - 19)  SpO2: 100% (09 Jun 2021 16:31) (98% - 100%)    ________________________________________________  PHYSICAL EXAM:  GENERAL: NAD  HEENT: Normocephalic;  conjunctivae and sclerae clear; moist mucous membranes;   NECK : supple  CHEST/LUNG: Clear to auscultation bilaterally with good air entry   HEART: S1 S2  regular; no murmurs, gallops or rubs  ABDOMEN: Soft, Nontender, Nondistended; Bowel sounds present  EXTREMITIES: no cyanosis; no edema; no calf tenderness  SKIN: warm and dry; no rash  NERVOUS SYSTEM:  Awake and alert;  ; no new deficits    _________________________________________________  LABS:                        8.9    7.39  )-----------( 240      ( 09 Jun 2021 06:24 )             28.0     06-09    142  |  110<H>  |  7   ----------------------------<  96  3.7   |  23  |  0.62    Ca    8.7      09 Jun 2021 06:24  Phos  3.2     06-09  Mg     1.9     06-09      PTT - ( 08 Jun 2021 13:45 )  PTT:38.8 sec    CAPILLARY BLOOD GLUCOSE      POCT Blood Glucose.: 154 mg/dL (09 Jun 2021 12:21)  POCT Blood Glucose.: 108 mg/dL (09 Jun 2021 07:44)  POCT Blood Glucose.: 168 mg/dL (08 Jun 2021 21:07)  POCT Blood Glucose.: 139 mg/dL (08 Jun 2021 17:01)        RADIOLOGY & ADDITIONAL TESTS:    < from: CT Abdomen and Pelvis w/ IV Cont (06.01.21 @ 10:11) >  IMPRESSION:    1. Rectal wall thickening, question proctitis.  2. Colonic diverticulosis without CT evidence of acute diverticulitis. No bowel obstruction.  3. Right adnexal mass (3.7 x 2.8 cm). Recommend pelvic ultrasound for further assessment.  4. Deep venous thrombosis within the imaged left lower extremity (left common femoral, femoral, and deep femoral veins).      < end of copied text >    < from: US Pelvis Complete (US Pelvis Complete .) (06.09.21 @ 15:51) >  IMPRESSION:  3.6 cm right uterine fibroid.      < end of copied text >      Imaging Personally Reviewed:  YES/NO    Consultant(s) Notes Reviewed:   YES/ No    Care Discussed with Consultants :     Plan of care was discussed with patient and /or primary care giver; all questions and concerns were addressed and care was aligned with patient's wishes.

## 2021-06-09 NOTE — PROGRESS NOTE ADULT - PROBLEM SELECTOR PLAN 1
Colonoscopy revealed   Anal Canal        Large internal hemorrhoids  Rectum	         Large ulcer bleeding 2 clips applied to stop bleeding  Sigmoid Colon  Few diverticula  Bleeding restarted, GI notified  IR consulted for IVC filter placement. NPO P MN for 6/10  Dr. Galvez, GI, following

## 2021-06-09 NOTE — PROGRESS NOTE ADULT - PROBLEM SELECTOR PLAN 4
CT noted above  Cancer marker 125: 10, GI Ca marker 19-9: <2  Follow up sonogram   tv sono cant be done as per radiology due patient physical and mental status  Dr. Yan, Heme/Onc, following

## 2021-06-09 NOTE — PROGRESS NOTE ADULT - SUBJECTIVE AND OBJECTIVE BOX
HPI:  75 YOF admitted with rectal bleeding.  CT revealed DVT, pt refused further imaging.  IR consulted for IVC filter placement.  Heme/Onc started patient specific Hep gtt.    OVERNIGHT EVENTS:  No new overnight events , had  bleeding ,agin yesterday  eating fair  MEDICATIONS  (STANDING):  carbidopa/levodopa/entacapone 12.5/50/200 1 Tablet(s) Oral three times a day  dextrose 5%. 1000 milliLiter(s) (60 mL/Hr) IV Continuous <Continuous>  enalapril 20 milliGRAM(s) Oral two times a day  insulin lispro (ADMELOG) corrective regimen sliding scale   SubCutaneous Before meals and at bedtime  oxybutynin XL 5 milliGRAM(s) Oral daily  pantoprazole    Tablet 40 milliGRAM(s) Oral before breakfast  rasagiline Tablet 1 milliGRAM(s) Oral daily  senna 2 Tablet(s) Oral at bedtime    MEDICATIONS  (PRN):  acetaminophen   Tablet .. 650 milliGRAM(s) Oral every 6 hours PRN Temp greater or equal to 38C (100.4F), Moderate Pain (4 - 6)  ALBUTerol    90 MICROgram(s) HFA Inhaler 2 Puff(s) Inhalation every 6 hours PRN Shortness of Breath and/or Wheezing  ondansetron Injectable 4 milliGRAM(s) IV Push every 6 hours PRN Nausea and/or Vomiting  polyethylene glycol 3350 17 Gram(s) Oral daily PRN Constipation    REVIEW OF SYSTEMS:      CONSTITUTIONAL: No fever  EYES: no acute visual disturbances  NECK: No pain or stiffness  RESPIRATORY: No cough; No shortness of breath  CARDIOVASCULAR: No chest pain, no palpitations  GASTROINTESTINAL: No pain. No nausea, vomiting or diarrhea   NEUROLOGICAL: No headache or numbness, no tremors  MUSCULOSKELETAL: No joint pain, no muscle pain  GENITOURINARY: no dysuria, no frequency, no hesitancy  PSYCHIATRY: no depression, no anxiety  ALL OTHER  ROS negative      Vital Signs Last 24 Hrs  T(C): 36.4 (09 Jun 2021 05:18), Max: 36.8 (08 Jun 2021 19:23)  T(F): 97.6 (09 Jun 2021 05:18), Max: 98.2 (08 Jun 2021 19:23)  HR: 67 (09 Jun 2021 05:18) (65 - 73)  BP: 119/72 (09 Jun 2021 05:18) (119/72 - 142/95)  BP(mean): --  RR: 18 (09 Jun 2021 05:18) (18 - 19)  SpO2: 99% (09 Jun 2021 05:18) (96% - 99%)  ________________________________________________  PHYSICAL EXAM:    GENERAL: NAD  HEENT: Normocephalic; conjunctivae and sclerae clear;  NECK : supple, no JVD  CHEST/LUNG: Clear to auscultation  HEART: S1 S2  regular  ABDOMEN: Soft, Nontender, Nondistended; Bowel sounds present  EXTREMITIES: no cyanosis; no LE edema; no calf tenderness  SKIN: warm and dry  NERVOUS SYSTEM:  Alert;but not oriented no new deficits    LABS:                        8.9    7.39  )-----------( 240      ( 09 Jun 2021 06:24 )             28.0     06-09    142  |  110<H>  |  7   ----------------------------<  96  3.7   |  23  |  0.62    Ca    8.7      09 Jun 2021 06:24  Phos  3.2     06-09  Mg     1.9     06-09      PTT - ( 08 Jun 2021 13:45 )  PTT:38.8 sec                          __________________________________________________  LABS:                        8.5    6.21  )-----------( 246      ( 08 Jun 2021 06:08 )             26.2     06-08    141  |  110<H>  |  6<L>  ----------------------------<  107<H>  3.7   |  24  |  0.67    Ca    8.5      08 Jun 2021 06:08  Phos  3.6     06-08  Mg     1.8     06-08    TPro  5.0<L>  /  Alb  2.1<L>  /  TBili  0.5  /  DBili  x   /  AST  17  /  ALT  7<L>  /  AlkPhos  88  06-07    PTT - ( 08 Jun 2021 03:56 )  PTT:56.3 sec                          LABS:                          9.3    7.39  )-----------( 260      ( 07 Jun 2021 07:55 )             27.7     06-07    140  |  109<H>  |  5<L>  ----------------------------<  111<H>  3.7   |  24  |  0.65    Ca    8.7      07 Jun 2021 07:55    TPro  5.0<L>  /  Alb  2.1<L>  /  TBili  0.5  /  DBili  x   /  AST  17  /  ALT  7<L>  /  AlkPhos  88  06-07    PT/INR - ( 06 Jun 2021 06:07 )   PT: 13.0 sec;   INR: 1.10 ratio         PTT - ( 07 Jun 2021 07:55 )  PTT:81.3 sec    CAPILLARY BLOOD GLUCOSE      POCT Blood Glucose.: 139 mg/dL (07 Jun 2021 11:27)  POCT Blood Glucose.: 138 mg/dL (07 Jun 2021 07:44)  POCT Blood Glucose.: 140 mg/dL (06 Jun 2021 21:21)  POCT Blood Glucose.: 125 mg/dL (06 Jun 2021 16:49)      __________________________________________________  RADIOLOGY & ADDITIONAL TESTS:    Imaging Personally Reviewed:  YES    < from: CT Abdomen and Pelvis w/ IV Cont (06.01.21 @ 10:11) >  FINDINGS:  LOWER CHEST: Clear lung bases.    LIVER: Multiple subcentimeter hepatic hypodensities, too small to further characterize.  BILE DUCTS: Normal caliber.  GALLBLADDER: Cholelithiasis.  SPLEEN: Within normal limits.  PANCREAS: Mildly atrophic.  ADRENALS: Within normal limits.  KIDNEYS/URETERS: Subcentimeter renal hypodensities, too small to further characterize. No hydronephrosis.    BLADDER: Within normal limits.  REPRODUCTIVE ORGANS: Uterus and left adnexa within normal limits. 3.7 x 2.8 cm right adnexal mass.    BOWEL: No bowel obstruction. Appendix is normal. Colonic diverticulosis. Rectal wall thickening.  PERITONEUM: Trace pelvic fluid.  VESSELS: Atherosclerotic changes. Filling defects noted within the left common femoral, femoral, and deep femoral veins.  RETROPERITONEUM/LYMPH NODES: No lymphadenopathy.  ABDOMINAL WALL: Small fat-containing left inguinal hernia.  BONES: Degenerative changes. Severe compression deformity of L2. Fixation cement noted within the L2 vertebral body. Mild wedge deformity of T11.    IMPRESSION:    1. Rectal wall thickening, question proctitis.  2. Colonic diverticulosis without CT evidence of acute diverticulitis. No bowel obstruction.  3. Right adnexal mass (3.7 x 2.8 cm). Recommend pelvic ultrasound for further assessment.  4. Deep venous thrombosis within the imaged left lower extremity (left common femoral, femoral, and deep femoral veins).    < end of copied text >    Consultant(s) Notes Reviewed:   YES     Plan of care was discussed with patient and /or primary care giver; all questions and concerns were addressed and care was aligned with patient's wishes.    Plan discussed with attending and consulting physicians.

## 2021-06-09 NOTE — PROGRESS NOTE ADULT - ASSESSMENT
75 YOF admitted with rectal bleeding.  CT revealed DVT, pt refused further imaging.  IR consulted for IVC filter placement.  Pt rectal bleeding restarted, Hep gtt stopped, labs ordered, Heme/Onc and GI notified.. IR reconsulted for IVC Filter Plan for IR to place in am NPO P MN. Patient also ordered for US abdominal to eval Adexenal Mass

## 2021-06-10 LAB
ALBUMIN SERPL ELPH-MCNC: 2.2 G/DL — LOW (ref 3.5–5)
ALP SERPL-CCNC: 82 U/L — SIGNIFICANT CHANGE UP (ref 40–120)
ALT FLD-CCNC: 8 U/L DA — LOW (ref 10–60)
ANION GAP SERPL CALC-SCNC: 4 MMOL/L — LOW (ref 5–17)
APTT BLD: 28.9 SEC — SIGNIFICANT CHANGE UP (ref 27.5–35.5)
AST SERPL-CCNC: 15 U/L — SIGNIFICANT CHANGE UP (ref 10–40)
BASOPHILS # BLD AUTO: 0.05 K/UL — SIGNIFICANT CHANGE UP (ref 0–0.2)
BASOPHILS NFR BLD AUTO: 0.7 % — SIGNIFICANT CHANGE UP (ref 0–2)
BILIRUB SERPL-MCNC: 0.5 MG/DL — SIGNIFICANT CHANGE UP (ref 0.2–1.2)
BUN SERPL-MCNC: 7 MG/DL — SIGNIFICANT CHANGE UP (ref 7–18)
CALCIUM SERPL-MCNC: 8.8 MG/DL — SIGNIFICANT CHANGE UP (ref 8.4–10.5)
CHLORIDE SERPL-SCNC: 109 MMOL/L — HIGH (ref 96–108)
CO2 SERPL-SCNC: 27 MMOL/L — SIGNIFICANT CHANGE UP (ref 22–31)
CREAT SERPL-MCNC: 0.73 MG/DL — SIGNIFICANT CHANGE UP (ref 0.5–1.3)
EOSINOPHIL # BLD AUTO: 0.27 K/UL — SIGNIFICANT CHANGE UP (ref 0–0.5)
EOSINOPHIL NFR BLD AUTO: 3.9 % — SIGNIFICANT CHANGE UP (ref 0–6)
GLUCOSE BLDC GLUCOMTR-MCNC: 125 MG/DL — HIGH (ref 70–99)
GLUCOSE BLDC GLUCOMTR-MCNC: 152 MG/DL — HIGH (ref 70–99)
GLUCOSE BLDC GLUCOMTR-MCNC: 153 MG/DL — HIGH (ref 70–99)
GLUCOSE BLDC GLUCOMTR-MCNC: 196 MG/DL — HIGH (ref 70–99)
GLUCOSE SERPL-MCNC: 155 MG/DL — HIGH (ref 70–99)
HCT VFR BLD CALC: 28.1 % — LOW (ref 34.5–45)
HGB BLD-MCNC: 8.9 G/DL — LOW (ref 11.5–15.5)
IMM GRANULOCYTES NFR BLD AUTO: 0.6 % — SIGNIFICANT CHANGE UP (ref 0–1.5)
INR BLD: 1.04 RATIO — SIGNIFICANT CHANGE UP (ref 0.88–1.16)
LYMPHOCYTES # BLD AUTO: 1.58 K/UL — SIGNIFICANT CHANGE UP (ref 1–3.3)
LYMPHOCYTES # BLD AUTO: 23 % — SIGNIFICANT CHANGE UP (ref 13–44)
MCHC RBC-ENTMCNC: 27.9 PG — SIGNIFICANT CHANGE UP (ref 27–34)
MCHC RBC-ENTMCNC: 31.7 GM/DL — LOW (ref 32–36)
MCV RBC AUTO: 88.1 FL — SIGNIFICANT CHANGE UP (ref 80–100)
MONOCYTES # BLD AUTO: 0.61 K/UL — SIGNIFICANT CHANGE UP (ref 0–0.9)
MONOCYTES NFR BLD AUTO: 8.9 % — SIGNIFICANT CHANGE UP (ref 2–14)
NEUTROPHILS # BLD AUTO: 4.31 K/UL — SIGNIFICANT CHANGE UP (ref 1.8–7.4)
NEUTROPHILS NFR BLD AUTO: 62.9 % — SIGNIFICANT CHANGE UP (ref 43–77)
NRBC # BLD: 0 /100 WBCS — SIGNIFICANT CHANGE UP (ref 0–0)
PLATELET # BLD AUTO: 234 K/UL — SIGNIFICANT CHANGE UP (ref 150–400)
POTASSIUM SERPL-MCNC: 4.1 MMOL/L — SIGNIFICANT CHANGE UP (ref 3.5–5.3)
POTASSIUM SERPL-SCNC: 4.1 MMOL/L — SIGNIFICANT CHANGE UP (ref 3.5–5.3)
PROT SERPL-MCNC: 5.1 G/DL — LOW (ref 6–8.3)
PROTHROM AB SERPL-ACNC: 12.3 SEC — SIGNIFICANT CHANGE UP (ref 10.6–13.6)
RBC # BLD: 3.19 M/UL — LOW (ref 3.8–5.2)
RBC # FLD: 16.7 % — HIGH (ref 10.3–14.5)
SARS-COV-2 RNA SPEC QL NAA+PROBE: SIGNIFICANT CHANGE UP
SODIUM SERPL-SCNC: 140 MMOL/L — SIGNIFICANT CHANGE UP (ref 135–145)
WBC # BLD: 6.86 K/UL — SIGNIFICANT CHANGE UP (ref 3.8–10.5)
WBC # FLD AUTO: 6.86 K/UL — SIGNIFICANT CHANGE UP (ref 3.8–10.5)

## 2021-06-10 PROCEDURE — 37191 INS ENDOVAS VENA CAVA FILTR: CPT

## 2021-06-10 RX ADMIN — Medication 20 MILLIGRAM(S): at 17:30

## 2021-06-10 RX ADMIN — Medication 1: at 08:18

## 2021-06-10 RX ADMIN — SENNA PLUS 2 TABLET(S): 8.6 TABLET ORAL at 22:10

## 2021-06-10 RX ADMIN — RASAGILINE 1 MILLIGRAM(S): 0.5 TABLET ORAL at 11:55

## 2021-06-10 RX ADMIN — CARBIDOPA, LEVODOPA, AND ENTACAPONE 1 TABLET(S): 50; 200; 200 TABLET, FILM COATED ORAL at 13:25

## 2021-06-10 RX ADMIN — PANTOPRAZOLE SODIUM 40 MILLIGRAM(S): 20 TABLET, DELAYED RELEASE ORAL at 05:29

## 2021-06-10 RX ADMIN — Medication 1: at 11:55

## 2021-06-10 RX ADMIN — SODIUM CHLORIDE 60 MILLILITER(S): 9 INJECTION, SOLUTION INTRAVENOUS at 23:07

## 2021-06-10 RX ADMIN — CARBIDOPA, LEVODOPA, AND ENTACAPONE 1 TABLET(S): 50; 200; 200 TABLET, FILM COATED ORAL at 22:09

## 2021-06-10 RX ADMIN — Medication 1: at 17:29

## 2021-06-10 RX ADMIN — Medication 5 MILLIGRAM(S): at 11:55

## 2021-06-10 RX ADMIN — Medication 20 MILLIGRAM(S): at 05:28

## 2021-06-10 RX ADMIN — CARBIDOPA, LEVODOPA, AND ENTACAPONE 1 TABLET(S): 50; 200; 200 TABLET, FILM COATED ORAL at 05:28

## 2021-06-10 NOTE — PRE PROCEDURE NOTE - PRE PROCEDURE EVALUATION
Interventional Radiology Pre-Procedure Note    Diagnosis/Indication: Patient is a 75y old Female with LE DVT, not a candidate for anticoagulation due to GI bleeding. IVC filter placement was requested.    PAST MEDICAL & SURGICAL HISTORY:  Parkinson disease  History of COPD  Diverticulosis  HTN (hypertension)  Diabetes mellitus  HLD (hyperlipidemia)  No significant past surgical history    Allergies: No Known Allergies    LABS:  CBC Full  -  ( 10 Maynor 2021 05:36 )  WBC Count : 6.86 K/uL  RBC Count : 3.19 M/uL  Hemoglobin : 8.9 g/dL  Hematocrit : 28.1 %  Platelet Count - Automated : 234 K/uL  Mean Cell Volume : 88.1 fl  Mean Cell Hemoglobin : 27.9 pg  Mean Cell Hemoglobin Concentration : 31.7 gm/dL  Auto Neutrophil # : 4.31 K/uL  Auto Lymphocyte # : 1.58 K/uL  Auto Monocyte # : 0.61 K/uL  Auto Eosinophil # : 0.27 K/uL  Auto Basophil # : 0.05 K/uL  Auto Neutrophil % : 62.9 %  Auto Lymphocyte % : 23.0 %  Auto Monocyte % : 8.9 %  Auto Eosinophil % : 3.9 %  Auto Basophil % : 0.7 %    06-10    140  |  109<H>  |  7   ----------------------------<  155<H>  4.1   |  27  |  0.73    Ca    8.8      10 Maynor 2021 05:36  Phos  3.2     06-09  Mg     1.9     06-09    TPro  5.1<L>  /  Alb  2.2<L>  /  TBili  0.5  /  DBili  x   /  AST  15  /  ALT  8<L>  /  AlkPhos  82  06-10    PT/INR - ( 10 Maynor 2021 05:36 )   PT: 12.3 sec;   INR: 1.04 ratio       PTT - ( 10 Maynor 2021 05:36 )  PTT:28.9 sec    Procedure/ risks/ benefits/ alternatives were discussed with the patient's daughter (Batool Johnson, [phone]), who verbalizes understanding, and witnessed informed consent was obtained.

## 2021-06-10 NOTE — PROGRESS NOTE ADULT - PROBLEM SELECTOR PLAN 1
Colonoscopy revealed   Anal Canal        Large internal hemorrhoids  Rectum	         Large ulcer bleeding 2 clips applied to stop bleeding  Sigmoid Colon  Few diverticula  Dr. Galvez, GI, following

## 2021-06-10 NOTE — PROGRESS NOTE ADULT - PROBLEM SELECTOR PLAN 4
CT  noted above  US noted above  Cancer marker 125: 10, GI Ca marker 19-9: <2  Dr. Yan, Heme/Onc, following

## 2021-06-10 NOTE — PROGRESS NOTE ADULT - SUBJECTIVE AND OBJECTIVE BOX
Patient is a 75y old  Female who presents with a chief complaint of rectal bleed (2021 16:03)      INTERVAL HPI/OVERNIGHT EVENTS: no new complaints s/o IVC filter    MEDICATIONS  (STANDING):  carbidopa/levodopa/entacapone 12.5/50/200 1 Tablet(s) Oral three times a day  dextrose 5%. 1000 milliLiter(s) (60 mL/Hr) IV Continuous <Continuous>  enalapril 20 milliGRAM(s) Oral two times a day  insulin lispro (ADMELOG) corrective regimen sliding scale   SubCutaneous Before meals and at bedtime  oxybutynin XL 5 milliGRAM(s) Oral daily  pantoprazole    Tablet 40 milliGRAM(s) Oral before breakfast  rasagiline Tablet 1 milliGRAM(s) Oral daily  senna 2 Tablet(s) Oral at bedtime    MEDICATIONS  (PRN):  acetaminophen   Tablet .. 650 milliGRAM(s) Oral every 6 hours PRN Temp greater or equal to 38C (100.4F), Moderate Pain (4 - 6)  ALBUTerol    90 MICROgram(s) HFA Inhaler 2 Puff(s) Inhalation every 6 hours PRN Shortness of Breath and/or Wheezing  ondansetron Injectable 4 milliGRAM(s) IV Push every 6 hours PRN Nausea and/or Vomiting  polyethylene glycol 3350 17 Gram(s) Oral daily PRN Constipation      __________________________________________________  REVIEW OF SYSTEMS:    CONSTITUTIONAL: No fever,   EYES: no acute visual disturbances  NECK: No pain or stiffness  RESPIRATORY: No cough; No shortness of breath  CARDIOVASCULAR: No chest pain, no palpitations  GASTROINTESTINAL: No pain. No nausea or vomiting; No diarrhea   NEUROLOGICAL: No headache or numbness, no tremors  MUSCULOSKELETAL: No joint pain, no muscle pain  GENITOURINARY: no dysuria, no frequency, no hesitancy  PSYCHIATRY: no depression , no anxiety  ALL OTHER  ROS negative      Vital Signs Last 24 Hrs  T(C): 36.4 (10 Maynor 2021 05:24), Max: 36.9 (2021 13:20)  T(F): 97.5 (10 Maynor 2021 05:24), Max: 98.5 (2021 13:20)  HR: 69 (10 Maynor 2021 05:24) (69 - 77)  BP: 147/71 (10 Maynor 2021 05:24) (126/70 - 147/71)  BP(mean): --  RR: 18 (10 Maynor 2021 05:24) (18 - 18)  SpO2: 100% (10 Maynor 2021 05:24) (100% - 100%)      ________________________________________________  PHYSICAL EXAM:  GENERAL: NAD  HEENT: Normocephalic;  conjunctivae and sclerae clear; moist mucous membranes;   NECK : supple  CHEST/LUNG: Clear to auscultation bilaterally with good air entry   HEART: S1 S2  regular; no murmurs, gallops or rubs  ABDOMEN: Soft, Nontender, Nondistended; Bowel sounds present  EXTREMITIES: no cyanosis; no edema; no calf tenderness  SKIN: warm and dry; no rash  NERVOUS SYSTEM:  Awake and alert;  ; no new deficits    _________________________________________________    LABS:                        8.9    6.86  )-----------( 234      ( 10 Myanor 2021 05:36 )             28.1     06-10    140  |  109<H>  |  7   ----------------------------<  155<H>  4.1   |  27  |  0.73    Ca    8.8      10 Maynor 2021 05:36  Phos  3.2     06-09  Mg     1.9     06-09    TPro  5.1<L>  /  Alb  2.2<L>  /  TBili  0.5  /  DBili  x   /  AST  15  /  ALT  8<L>  /  AlkPhos  82  06-10    PT/INR - ( 10 Maynor 2021 05:36 )   PT: 12.3 sec;   INR: 1.04 ratio         PTT - ( 10 Maynor 2021 05:36 )  PTT:28.9 sec  Urinalysis Basic - ( 2021 17:19 )    Color: Yellow / Appearance: very cloudy / S.010 / pH: x  Gluc: x / Ketone: Negative  / Bili: Negative / Urobili: Negative   Blood: x / Protein: 100 / Nitrite: Negative   Leuk Esterase: Moderate / RBC: 10-25 /HPF / WBC >50 /HPF   Sq Epi: x / Non Sq Epi: Few /HPF / Bacteria: Many /HPF                        LABS:                        8.9    7.39  )-----------( 240      ( 2021 06:24 )             28.0     06-09    142  |  110<H>  |  7   ----------------------------<  96  3.7   |  23  |  0.62    Ca    8.7      2021 06:24  Phos  3.2     06-09  Mg     1.9     06-09      PTT - ( 2021 13:45 )  PTT:38.8 sec    CAPILLARY BLOOD GLUCOSE      POCT Blood Glucose.: 154 mg/dL (2021 12:21)  POCT Blood Glucose.: 108 mg/dL (2021 07:44)  POCT Blood Glucose.: 168 mg/dL (2021 21:07)  POCT Blood Glucose.: 139 mg/dL (2021 17:01)        RADIOLOGY & ADDITIONAL TESTS:    < from: CT Abdomen and Pelvis w/ IV Cont (21 @ 10:11) >  IMPRESSION:    1. Rectal wall thickening, question proctitis.  2. Colonic diverticulosis without CT evidence of acute diverticulitis. No bowel obstruction.  3. Right adnexal mass (3.7 x 2.8 cm). Recommend pelvic ultrasound for further assessment.  4. Deep venous thrombosis within the imaged left lower extremity (left common femoral, femoral, and deep femoral veins).      < end of copied text >    < from: US Pelvis Complete (US Pelvis Complete .) (21 @ 15:51) >  IMPRESSION:  3.6 cm right uterine fibroid.      < end of copied text >      Imaging Personally Reviewed:  YES/NO    Consultant(s) Notes Reviewed:   YES/ No    Care Discussed with Consultants :     Plan of care was discussed with patient and /or primary care giver; all questions and concerns were addressed and care was aligned with patient's wishes.

## 2021-06-10 NOTE — PROGRESS NOTE ADULT - SUBJECTIVE AND OBJECTIVE BOX
off heparin  no bleeding now  no sob    MEDICATIONS  (STANDING):  carbidopa/levodopa/entacapone 12.5/50/200 1 Tablet(s) Oral three times a day  dextrose 5%. 1000 milliLiter(s) (60 mL/Hr) IV Continuous <Continuous>  enalapril 20 milliGRAM(s) Oral two times a day  insulin lispro (ADMELOG) corrective regimen sliding scale   SubCutaneous Before meals and at bedtime  oxybutynin XL 5 milliGRAM(s) Oral daily  pantoprazole    Tablet 40 milliGRAM(s) Oral before breakfast  rasagiline Tablet 1 milliGRAM(s) Oral daily  senna 2 Tablet(s) Oral at bedtime    MEDICATIONS  (PRN):  acetaminophen   Tablet .. 650 milliGRAM(s) Oral every 6 hours PRN Temp greater or equal to 38C (100.4F), Moderate Pain (4 - 6)  ALBUTerol    90 MICROgram(s) HFA Inhaler 2 Puff(s) Inhalation every 6 hours PRN Shortness of Breath and/or Wheezing  ondansetron Injectable 4 milliGRAM(s) IV Push every 6 hours PRN Nausea and/or Vomiting  polyethylene glycol 3350 17 Gram(s) Oral daily PRN Constipation      Allergies    No Known Allergies    Intolerances        Vital Signs Last 24 Hrs  T(C): 36.4 (10 Maynor 2021 05:24), Max: 36.9 (09 Jun 2021 13:20)  T(F): 97.5 (10 Maynor 2021 05:24), Max: 98.5 (09 Jun 2021 13:20)  HR: 69 (10 Maynor 2021 05:24) (69 - 77)  BP: 147/71 (10 Maynor 2021 05:24) (126/70 - 147/71)  BP(mean): --  RR: 18 (10 Maynor 2021 05:24) (18 - 18)  SpO2: 100% (10 Maynor 2021 05:24) (100% - 100%)    PHYSICAL EXAM  General: adult in NAD  HEENT: clear oropharynx, anicteric sclera, pink conjunctiva  Neck: supple  CV: normal S1/S2 with no murmur rubs or gallops  Lungs: positive air movement b/l ant lungs,clear to auscultation, no wheezes, no rales  Abdomen: soft non-tender non-distended, no hepatosplenomegaly  Ext: no clubbing cyanosis or edema  Skin: no rashes and no petechiae  Neuro: alert and oriented X 4, no focal deficits  LABS:                          8.9    6.86  )-----------( 234      ( 10 Maynor 2021 05:36 )             28.1         Mean Cell Volume : 88.1 fl  Mean Cell Hemoglobin : 27.9 pg  Mean Cell Hemoglobin Concentration : 31.7 gm/dL  Auto Neutrophil # : 4.31 K/uL  Auto Lymphocyte # : 1.58 K/uL  Auto Monocyte # : 0.61 K/uL  Auto Eosinophil # : 0.27 K/uL  Auto Basophil # : 0.05 K/uL  Auto Neutrophil % : 62.9 %  Auto Lymphocyte % : 23.0 %  Auto Monocyte % : 8.9 %  Auto Eosinophil % : 3.9 %  Auto Basophil % : 0.7 %    Serial CBC  Hematocrit 28.1  Hemoglobin 8.9  Plat 234  RBC 3.19  WBC 6.86  Serial CBC  Hematocrit 28.0  Hemoglobin 8.9  Plat 240  RBC 3.19  WBC 7.39  Serial CBC  Hematocrit 27.6  Hemoglobin 9.0  Plat 242  RBC 3.21  WBC 7.59  Serial CBC  Hematocrit 26.2  Hemoglobin 8.5  Plat 246  RBC 3.02  WBC 6.21  Serial CBC  Hematocrit 27.7  Hemoglobin 9.3  Plat 260  RBC 3.26  WBC 7.39  Serial CBC  Hematocrit 28.6  Hemoglobin 9.5  Plat 247  RBC 3.37  WBC 7.28    06-10    140  |  109<H>  |  7   ----------------------------<  155<H>  4.1   |  27  |  0.73    Ca    8.8      10 Maynor 2021 05:36  Phos  3.2     06-09  Mg     1.9     06-09    TPro  5.1<L>  /  Alb  2.2<L>  /  TBili  0.5  /  DBili  x   /  AST  15  /  ALT  8<L>  /  AlkPhos  82  06-10      PT/INR - ( 10 Maynor 2021 05:36 )   PT: 12.3 sec;   INR: 1.04 ratio         PTT - ( 10 Maynor 2021 05:36 )  PTT:28.9 sec              BLOOD SMEAR INTERPRETATION:       RADIOLOGY & ADDITIONAL STUDIES:

## 2021-06-10 NOTE — PROCEDURE NOTE - NSINFORMCONSENT_GEN_A_CORE
patient's daughter/Benefits, risks, and possible complications of procedure explained to patient/caregiver who verbalized understanding and gave verbal consent.

## 2021-06-10 NOTE — PROGRESS NOTE ADULT - SUBJECTIVE AND OBJECTIVE BOX
[   ] ICU                                          [   ] CCU                                      [ X  ] Medical Floor    Patient is a 75 year old with rectal bleeding. GI consulted to evaluate.      Patient is a 75 year old female from [place], with past medical history significant for Parkinson's, COPD, diverticulosis, HTN, DM and HLD, who presented to the ED due to rectal bleed. History is limited from patient due to her being a poor historian. As per ED provider note, patient was sent to the ED for concern for vaginal bleed. In ED, patient is noted to have rectal bleed instead of vaginal bleed. Patient is confused unable to provide any history. No abdominal pain, nausea, vomiting, hematemesis, melena, fever, chills, chest pain, SOB, cough, hematuria, dysuria or diarrhea reported.       Today patient appears comfortable with no new complaints. No abdominal pain, N/V, hematemesis, hematochezia, melena, fever, chills, chest pain, SOB, cough or diarrhea reported.    PAIN MANAGEMENT:  Pain Scale:                0/10  Pain Location:      Prior Colonoscopy: Unknown    PAST MEDICAL HISTORY  Parkinson disease  COPD  Diverticulosis  HTN (hypertension)  Diabetes mellitus  HLD (hyperlipidemia)      PAST SURGICAL HISTORY  No significant past surgical history      Allergies    No Known Allergies    Intolerances  None        SOCIAL HISTORY  Advanced Directives:       [ X ] Full Code       [  ] DNR  Marital Status:         [  ] M      [ X ] S      [  ] D       [  ] W  Children:       [ X ] Yes      [  ] No  Occupation:        [  ] Employed       [ X ] Unemployed       [  ] Retired  Diet:       [ X ] Regular       [  ] PEG feeding          [  ] NG tube feeding  Drug Use:           [ X ] Patient denied          [  ] Yes  Alcohol:           [ X ] No             [  ] Yes (socially)         [  ] Yes (chronic)  Tobacco:           [  ] Yes           [ X ] No    FAMILY HISTORY  [ X ] Heart Disease            [ X ] Diabetes             [ X ] HTN             [  ] Colon Cancer             [  ] Stomach Cancer              [  ] Pancreatic Cancer        VITALS  Vital Signs Last 24 Hrs  T(C): 36.6 (10 Maynor 2021 13:19), Max: 36.6 (10 Maynor 2021 13:19)  T(F): 97.8 (10 Maynor 2021 13:19), Max: 97.8 (10 Maynor 2021 13:19)  HR: 70 (10 Maynor 2021 13:19) (69 - 70)  BP: 125/88 (10 Maynor 2021 13:19) (125/88 - 147/71)   RR: 18 (10 Maynor 2021 13:19) (18 - 18)  SpO2: 100% (10 Maynor 2021 13:19) (100% - 100%)       MEDICATIONS  (STANDING):  carbidopa/levodopa/entacapone 12.5/50/200 1 Tablet(s) Oral three times a day  dextrose 5%. 1000 milliLiter(s) (60 mL/Hr) IV Continuous <Continuous>  enalapril 20 milliGRAM(s) Oral two times a day  insulin lispro (ADMELOG) corrective regimen sliding scale   SubCutaneous Before meals and at bedtime  oxybutynin XL 5 milliGRAM(s) Oral daily  pantoprazole    Tablet 40 milliGRAM(s) Oral before breakfast  rasagiline Tablet 1 milliGRAM(s) Oral daily  senna 2 Tablet(s) Oral at bedtime    MEDICATIONS  (PRN):  acetaminophen   Tablet .. 650 milliGRAM(s) Oral every 6 hours PRN Temp greater or equal to 38C (100.4F), Moderate Pain (4 - 6)  ALBUTerol    90 MICROgram(s) HFA Inhaler 2 Puff(s) Inhalation every 6 hours PRN Shortness of Breath and/or Wheezing  ondansetron Injectable 4 milliGRAM(s) IV Push every 6 hours PRN Nausea and/or Vomiting  polyethylene glycol 3350 17 Gram(s) Oral daily PRN Constipation                            8.9    6.86  )-----------( 234      ( 10 Maynor 2021 05:36 )             28.1       06-10    140  |  109<H>  |  7   ----------------------------<  155<H>  4.1   |  27  |  0.73    Ca    8.8      10 Maynor 2021 05:36  Phos  3.2     06-09  Mg     1.9     06-09    TPro  5.1<L>  /  Alb  2.2<L>  /  TBili  0.5  /  DBili  x   /  AST  15  /  ALT  8<L>  /  AlkPhos  82  06-10      PT/INR - ( 10 Maynor 2021 05:36 )   PT: 12.3 sec;   INR: 1.04 ratio         PTT - ( 10 Maynor 2021 05:36 )  PTT:28.9 sec

## 2021-06-10 NOTE — PROGRESS NOTE ADULT - SUBJECTIVE AND OBJECTIVE BOX
HPI:  75 YOF admitted with rectal bleeding.  CT revealed DVT, pt refused further imaging.  Pt rectal bleeding restarted, Hep gtt stopped, IR reconsulted for IVC filter placement.  Pt to have filter placed today.    OVERNIGHT EVENTS:  No new overnight events     REVIEW OF SYSTEMS:      CONSTITUTIONAL: No fever  EYES: no acute visual disturbances  NECK: No pain or stiffness  RESPIRATORY: No cough; No shortness of breath  CARDIOVASCULAR: No chest pain, no palpitations  GASTROINTESTINAL: No pain. No nausea, vomiting or diarrhea   NEUROLOGICAL: No headache or numbness, no tremors  MUSCULOSKELETAL: No joint pain, no muscle pain  GENITOURINARY: no dysuria, no frequency, no hesitancy  PSYCHIATRY: no depression, no anxiety  ALL OTHER  ROS negative        Vital Signs Last 24 Hrs  T(C): 36.4 (10 Maynor 2021 05:24), Max: 36.6 (2021 16:31)  T(F): 97.5 (10 Maynor 2021 05:24), Max: 97.8 (2021 16:31)  HR: 69 (10 Maynor 2021 05:24) (69 - 76)  BP: 147/71 (10 Maynor 2021 05:24) (126/70 - 147/71)  BP(mean): --  RR: 18 (10 Maynor 2021 05:24) (18 - 18)  SpO2: 100% (10 Maynor 2021 05:24) (100% - 100%)    ________________________________________________  PHYSICAL EXAM:    GENERAL: NAD  HEENT: Normocephalic; conjunctivae and sclerae clear;  NECK : supple, no JVD  CHEST/LUNG: Clear to auscultation  HEART: S1 S2  regular  ABDOMEN: Soft, Nontender, Nondistended; Bowel sounds present  EXTREMITIES: no cyanosis; no LE edema; no calf tenderness  SKIN: warm and dry  NERVOUS SYSTEM:  Alert; no new deficits    _________________________________________________  CURRENT MEDICATIONS:    MEDICATIONS  (STANDING):  carbidopa/levodopa/entacapone 12.5/50/200 1 Tablet(s) Oral three times a day  dextrose 5%. 1000 milliLiter(s) (60 mL/Hr) IV Continuous <Continuous>  enalapril 20 milliGRAM(s) Oral two times a day  insulin lispro (ADMELOG) corrective regimen sliding scale   SubCutaneous Before meals and at bedtime  oxybutynin XL 5 milliGRAM(s) Oral daily  pantoprazole    Tablet 40 milliGRAM(s) Oral before breakfast  rasagiline Tablet 1 milliGRAM(s) Oral daily  senna 2 Tablet(s) Oral at bedtime    MEDICATIONS  (PRN):  acetaminophen   Tablet .. 650 milliGRAM(s) Oral every 6 hours PRN Temp greater or equal to 38C (100.4F), Moderate Pain (4 - 6)  ALBUTerol    90 MICROgram(s) HFA Inhaler 2 Puff(s) Inhalation every 6 hours PRN Shortness of Breath and/or Wheezing  ondansetron Injectable 4 milliGRAM(s) IV Push every 6 hours PRN Nausea and/or Vomiting  polyethylene glycol 3350 17 Gram(s) Oral daily PRN Constipation      __________________________________________________  LABS:                          8.9    6.86  )-----------( 234      ( 10 Maynor 2021 05:36 )             28.1     06-10    140  |  109<H>  |  7   ----------------------------<  155<H>  4.1   |  27  |  0.73    Ca    8.8      10 Maynor 2021 05:36  Phos  3.2     06-09  Mg     1.9     06-09    TPro  5.1<L>  /  Alb  2.2<L>  /  TBili  0.5  /  DBili  x   /  AST  15  /  ALT  8<L>  /  AlkPhos  82  06-10    PT/INR - ( 10 Maynor 2021 05:36 )   PT: 12.3 sec;   INR: 1.04 ratio         PTT - ( 10 Maynor 2021 05:36 )  PTT:28.9 sec  Urinalysis Basic - ( 2021 17:19 )    Color: Yellow / Appearance: very cloudy / S.010 / pH: x  Gluc: x / Ketone: Negative  / Bili: Negative / Urobili: Negative   Blood: x / Protein: 100 / Nitrite: Negative   Leuk Esterase: Moderate / RBC: 10-25 /HPF / WBC >50 /HPF   Sq Epi: x / Non Sq Epi: Few /HPF / Bacteria: Many /HPF      CAPILLARY BLOOD GLUCOSE      POCT Blood Glucose.: 153 mg/dL (10 Maynor 2021 11:50)  POCT Blood Glucose.: 196 mg/dL (10 Maynor 2021 08:07)  POCT Blood Glucose.: 192 mg/dL (2021 20:58)  POCT Blood Glucose.: 134 mg/dL (2021 17:08)      __________________________________________________  RADIOLOGY & ADDITIONAL TESTS:    Imaging Personally Reviewed:  YES    < from: CT Abdomen and Pelvis w/ IV Cont (21 @ 10:11) >  LIVER: Multiple subcentimeter hepatic hypodensities, too small to further characterize.  BILE DUCTS: Normal caliber.  GALLBLADDER: Cholelithiasis.  SPLEEN: Within normal limits.  PANCREAS: Mildly atrophic.  ADRENALS: Within normal limits.  KIDNEYS/URETERS: Subcentimeter renal hypodensities, too small to further characterize. No hydronephrosis.    BLADDER: Within normal limits.  REPRODUCTIVE ORGANS: Uterus and left adnexa within normal limits. 3.7 x 2.8 cm right adnexal mass.    BOWEL: No bowel obstruction. Appendix is normal. Colonic diverticulosis. Rectal wall thickening.  PERITONEUM: Trace pelvic fluid.  VESSELS: Atherosclerotic changes. Filling defects noted within the left common femoral, femoral, and deep femoral veins.  RETROPERITONEUM/LYMPH NODES: No lymphadenopathy.  ABDOMINAL WALL: Small fat-containing left inguinal hernia.  BONES: Degenerative changes. Severe compression deformity of L2. Fixation cement noted within the L2 vertebral body. Mild wedge deformity of T11.    IMPRESSION:    1. Rectal wall thickening, question proctitis.  2. Colonic diverticulosis without CT evidence of acute diverticulitis. No bowel obstruction.  3. Right adnexal mass (3.7 x 2.8 cm). Recommend pelvic ultrasound for further assessment.  4. Deep venous thrombosis within the imaged left lower extremity (left common femoral, femoral, and deep femoral veins).    < end of copied text >    < from: US Pelvis Complete (US Pelvis Complete .) (21 @ 15:51) >  Uterus: 6.3 cm x 3.5 cm x 4.4 cm. 3.1 x 3.6 x 3.4 cm subserosal exophytic right uterine fibroid.  Endometrium: 4 mm. Within normal limits.    The ovaries are not visualized due to abundant bowel gas in the adnexa.    Sheppard catheter in the urinary bladder. Dependent debris in the urinary bladder.    Fluid: No pelvic free fluid.    IMPRESSION:  3.6 cm right uterine fibroid.    < end of copied text >    Consultant(s) Notes Reviewed:   YES     Plan of care was discussed with patient and /or primary care giver; all questions and concerns were addressed and care was aligned with patient's wishes.    Plan discussed with attending and consulting physicians.

## 2021-06-10 NOTE — PROGRESS NOTE ADULT - TIME BILLING
patient with above   check u cs  monitor h/h   d/c planning in am if stable  care plan d/w np and patient's rep.

## 2021-06-11 DIAGNOSIS — Z20.822 CONTACT WITH AND (SUSPECTED) EXPOSURE TO COVID-19: ICD-10-CM

## 2021-06-11 LAB
ANION GAP SERPL CALC-SCNC: 6 MMOL/L — SIGNIFICANT CHANGE UP (ref 5–17)
BUN SERPL-MCNC: 7 MG/DL — SIGNIFICANT CHANGE UP (ref 7–18)
CALCIUM SERPL-MCNC: 8.2 MG/DL — LOW (ref 8.4–10.5)
CHLORIDE SERPL-SCNC: 106 MMOL/L — SIGNIFICANT CHANGE UP (ref 96–108)
CO2 SERPL-SCNC: 23 MMOL/L — SIGNIFICANT CHANGE UP (ref 22–31)
CREAT SERPL-MCNC: 0.6 MG/DL — SIGNIFICANT CHANGE UP (ref 0.5–1.3)
GLUCOSE BLDC GLUCOMTR-MCNC: 150 MG/DL — HIGH (ref 70–99)
GLUCOSE BLDC GLUCOMTR-MCNC: 173 MG/DL — HIGH (ref 70–99)
GLUCOSE BLDC GLUCOMTR-MCNC: 179 MG/DL — HIGH (ref 70–99)
GLUCOSE BLDC GLUCOMTR-MCNC: 221 MG/DL — HIGH (ref 70–99)
GLUCOSE SERPL-MCNC: 235 MG/DL — HIGH (ref 70–99)
HCT VFR BLD CALC: 24.9 % — LOW (ref 34.5–45)
HGB BLD-MCNC: 7.9 G/DL — LOW (ref 11.5–15.5)
MAGNESIUM SERPL-MCNC: 1.6 MG/DL — SIGNIFICANT CHANGE UP (ref 1.6–2.6)
MCHC RBC-ENTMCNC: 27.9 PG — SIGNIFICANT CHANGE UP (ref 27–34)
MCHC RBC-ENTMCNC: 31.7 GM/DL — LOW (ref 32–36)
MCV RBC AUTO: 88 FL — SIGNIFICANT CHANGE UP (ref 80–100)
NRBC # BLD: 0 /100 WBCS — SIGNIFICANT CHANGE UP (ref 0–0)
PHOSPHATE SERPL-MCNC: 2.8 MG/DL — SIGNIFICANT CHANGE UP (ref 2.5–4.5)
PLATELET # BLD AUTO: 202 K/UL — SIGNIFICANT CHANGE UP (ref 150–400)
POTASSIUM SERPL-MCNC: 3.5 MMOL/L — SIGNIFICANT CHANGE UP (ref 3.5–5.3)
POTASSIUM SERPL-SCNC: 3.5 MMOL/L — SIGNIFICANT CHANGE UP (ref 3.5–5.3)
RBC # BLD: 2.83 M/UL — LOW (ref 3.8–5.2)
RBC # FLD: 16.6 % — HIGH (ref 10.3–14.5)
SODIUM SERPL-SCNC: 135 MMOL/L — SIGNIFICANT CHANGE UP (ref 135–145)
WBC # BLD: 5.19 K/UL — SIGNIFICANT CHANGE UP (ref 3.8–10.5)
WBC # FLD AUTO: 5.19 K/UL — SIGNIFICANT CHANGE UP (ref 3.8–10.5)

## 2021-06-11 RX ADMIN — PANTOPRAZOLE SODIUM 40 MILLIGRAM(S): 20 TABLET, DELAYED RELEASE ORAL at 06:20

## 2021-06-11 RX ADMIN — Medication 650 MILLIGRAM(S): at 15:52

## 2021-06-11 RX ADMIN — CARBIDOPA, LEVODOPA, AND ENTACAPONE 1 TABLET(S): 50; 200; 200 TABLET, FILM COATED ORAL at 15:20

## 2021-06-11 RX ADMIN — Medication 1: at 17:23

## 2021-06-11 RX ADMIN — CARBIDOPA, LEVODOPA, AND ENTACAPONE 1 TABLET(S): 50; 200; 200 TABLET, FILM COATED ORAL at 06:20

## 2021-06-11 RX ADMIN — RASAGILINE 1 MILLIGRAM(S): 0.5 TABLET ORAL at 11:53

## 2021-06-11 RX ADMIN — SODIUM CHLORIDE 60 MILLILITER(S): 9 INJECTION, SOLUTION INTRAVENOUS at 17:34

## 2021-06-11 RX ADMIN — Medication 2: at 12:25

## 2021-06-11 RX ADMIN — Medication 5 MILLIGRAM(S): at 11:53

## 2021-06-11 RX ADMIN — Medication 650 MILLIGRAM(S): at 18:00

## 2021-06-11 RX ADMIN — Medication 20 MILLIGRAM(S): at 17:25

## 2021-06-11 RX ADMIN — SENNA PLUS 2 TABLET(S): 8.6 TABLET ORAL at 22:16

## 2021-06-11 RX ADMIN — Medication 1: at 22:15

## 2021-06-11 RX ADMIN — CARBIDOPA, LEVODOPA, AND ENTACAPONE 1 TABLET(S): 50; 200; 200 TABLET, FILM COATED ORAL at 22:16

## 2021-06-11 NOTE — PROGRESS NOTE ADULT - PROBLEM SELECTOR PLAN 1
6/10 PCR negative  Repeat PCR 6/15  Continue Isolation  Care management to determine appropriate timing for return to facility

## 2021-06-11 NOTE — PROGRESS NOTE ADULT - TIME BILLING
PATIENT WITH [facility] FOR [facility] CARE   PT OT   WAITING [facility] AUTH FOR RETURN TO [facility]   CARE PLAN D/W  NP , CM AND PATIENT'S FAMILY

## 2021-06-11 NOTE — PROGRESS NOTE ADULT - ASSESSMENT
· Assessment	  75 year old lady presented with rectal bleeding.  CT showed rectal thickening, adnexal mass, and DVT.    1. rectal bleeding. will do sigmoidoscopy  please give her enema  discussed with Dr. Leonor herr prep  large hemorrhoid, and rectl ulcer  cauterized    2. adnexal mass  will do transvaginal ultrasound  check ca 125    3. DVT  not on AC yet due to rectal bleeding  for sigmoidoscopy  she has rectal bleeding again while on heparin  IVC filter placed

## 2021-06-11 NOTE — PROGRESS NOTE ADULT - SUBJECTIVE AND OBJECTIVE BOX
HPI:  75 YOF admitted with rectal bleeding.  CT revealed DVT, pt refused further imaging.  Pt rectal bleeding restarted, Hep gtt stopped, IR reconsulted for IVC filter placement.  IVC filter placed 6/10.  Bleeding has resolved.  Pt with COVID exposure.    OVERNIGHT EVENTS:  No new overnight events     REVIEW OF SYSTEMS:      CONSTITUTIONAL: No fever  EYES: no acute visual disturbances  NECK: No pain or stiffness  RESPIRATORY: No cough; No shortness of breath  CARDIOVASCULAR: No chest pain, no palpitations  GASTROINTESTINAL: No pain. No nausea, vomiting or diarrhea   NEUROLOGICAL: No headache or numbness, no tremors  MUSCULOSKELETAL: No joint pain, no muscle pain  GENITOURINARY: no dysuria, no frequency, no hesitancy  PSYCHIATRY: no depression, no anxiety  ALL OTHER  ROS negative        Vital Signs Last 24 Hrs  T(C): 36.5 (2021 14:01), Max: 36.5 (2021 14:01)  T(F): 97.7 (2021 14:01), Max: 97.7 (2021 14:01)  HR: 70 (2021 14:12) (63 - 75)  BP: 120/61 (2021 14:12) (90/50 - 120/61)  BP(mean): --  RR: 17 (2021 14:12) (17 - 18)  SpO2: 99% (2021 14:12) (99% - 100%)    ________________________________________________  PHYSICAL EXAM:    GENERAL: NAD  HEENT: Normocephalic; conjunctivae and sclerae clear;  NECK : supple, no JVD  CHEST/LUNG: Clear to auscultation  HEART: S1 S2  regular  ABDOMEN: Soft, Nontender, Nondistended; Bowel sounds present  EXTREMITIES: no cyanosis; no LE edema; no calf tenderness  SKIN: warm and dry  NERVOUS SYSTEM:  Alert; no new deficits    _________________________________________________  CURRENT MEDICATIONS:    MEDICATIONS  (STANDING):  carbidopa/levodopa/entacapone 12.5/50/200 1 Tablet(s) Oral three times a day  dextrose 5%. 1000 milliLiter(s) (60 mL/Hr) IV Continuous <Continuous>  enalapril 20 milliGRAM(s) Oral two times a day  insulin lispro (ADMELOG) corrective regimen sliding scale   SubCutaneous Before meals and at bedtime  oxybutynin XL 5 milliGRAM(s) Oral daily  pantoprazole    Tablet 40 milliGRAM(s) Oral before breakfast  rasagiline Tablet 1 milliGRAM(s) Oral daily  senna 2 Tablet(s) Oral at bedtime    MEDICATIONS  (PRN):  acetaminophen   Tablet .. 650 milliGRAM(s) Oral every 6 hours PRN Temp greater or equal to 38C (100.4F), Moderate Pain (4 - 6)  ALBUTerol    90 MICROgram(s) HFA Inhaler 2 Puff(s) Inhalation every 6 hours PRN Shortness of Breath and/or Wheezing  ondansetron Injectable 4 milliGRAM(s) IV Push every 6 hours PRN Nausea and/or Vomiting  polyethylene glycol 3350 17 Gram(s) Oral daily PRN Constipation      __________________________________________________  LABS:                          7.9    5.19  )-----------( 202      ( 2021 07:38 )             24.9     06-11    135  |  106  |  7   ----------------------------<  235<H>  3.5   |  23  |  0.60    Ca    8.2<L>      2021 07:38  Phos  2.8     06-11  Mg     1.6     06-11    TPro  5.1<L>  /  Alb  2.2<L>  /  TBili  0.5  /  DBili  x   /  AST  15  /  ALT  8<L>  /  AlkPhos  82  06-10    PT/INR - ( 10 Maynor 2021 05:36 )   PT: 12.3 sec;   INR: 1.04 ratio         PTT - ( 10 Maynor 2021 05:36 )  PTT:28.9 sec  Urinalysis Basic - ( 2021 17:19 )    Color: Yellow / Appearance: very cloudy / S.010 / pH: x  Gluc: x / Ketone: Negative  / Bili: Negative / Urobili: Negative   Blood: x / Protein: 100 / Nitrite: Negative   Leuk Esterase: Moderate / RBC: 10-25 /HPF / WBC >50 /HPF   Sq Epi: x / Non Sq Epi: Few /HPF / Bacteria: Many /HPF      CAPILLARY BLOOD GLUCOSE      POCT Blood Glucose.: 221 mg/dL (2021 12:01)  POCT Blood Glucose.: 150 mg/dL (2021 08:09)  POCT Blood Glucose.: 125 mg/dL (10 Maynor 2021 20:52)  POCT Blood Glucose.: 152 mg/dL (10 Maynor 2021 17:26)      __________________________________________________  RADIOLOGY & ADDITIONAL TESTS:    Imaging Personally Reviewed:  YES    < from: CT Abdomen and Pelvis w/ IV Cont (21 @ 10:11) >  FINDINGS:  LOWER CHEST: Clear lung bases.    LIVER: Multiple subcentimeter hepatic hypodensities, too small to further characterize.  BILE DUCTS: Normal caliber.  GALLBLADDER: Cholelithiasis.  SPLEEN: Within normal limits.  PANCREAS: Mildly atrophic.  ADRENALS: Within normal limits.  KIDNEYS/URETERS: Subcentimeter renal hypodensities, too small to further characterize. No hydronephrosis.    BLADDER: Within normal limits.  REPRODUCTIVE ORGANS: Uterus and left adnexa within normal limits. 3.7 x 2.8 cm right adnexal mass.    BOWEL: No bowel obstruction. Appendix is normal. Colonic diverticulosis. Rectal wall thickening.  PERITONEUM: Trace pelvic fluid.  VESSELS: Atherosclerotic changes. Filling defects noted within the left common femoral, femoral, and deep femoral veins.  RETROPERITONEUM/LYMPH NODES: No lymphadenopathy.  ABDOMINAL WALL: Small fat-containing left inguinal hernia.  BONES: Degenerative changes. Severe compression deformity of L2. Fixation cement noted within the L2 vertebral body. Mild wedge deformity of T11.    IMPRESSION:    1. Rectal wall thickening, question proctitis.  2. Colonic diverticulosis without CT evidence of acute diverticulitis. No bowel obstruction.  3. Right adnexal mass (3.7 x 2.8 cm). Recommend pelvic ultrasound for further assessment.  4. Deep venous thrombosis within the imaged left lower extremity (left common femoral, femoral, and deep femoral veins).    < end of copied text >    < from: US Pelvis Complete (US Pelvis Complete .) (21 @ 15:51) >  FINDINGS:    Uterus: 6.3 cm x 3.5 cm x 4.4 cm. 3.1 x 3.6 x 3.4 cm subserosal exophytic right uterine fibroid.  Endometrium: 4 mm. Within normal limits.    The ovaries are not visualized due to abundant bowel gas in the adnexa.    Sheppard catheter in the urinary bladder. Dependent debris in the urinary bladder.    Fluid: No pelvic free fluid.    IMPRESSION:  3.6 cm right uterine fibroid.    < end of copied text >    Consultant(s) Notes Reviewed:   YES     Plan of care was discussed with patient and /or primary care giver; all questions and concerns were addressed and care was aligned with patient's wishes.    Plan discussed with attending and consulting physicians.

## 2021-06-11 NOTE — PROGRESS NOTE ADULT - SUBJECTIVE AND OBJECTIVE BOX
HPI:  75 YOF admitted with rectal bleeding.  CT revealed DVT, pt refused further imaging.  Pt rectal bleeding restarted, Hep gtt stopped, IR reconsulted for IVC filter placement.  Pt to have filter placed today.    OVERNIGHT EVENTS:  No new overnight events   MEDICATIONS  (STANDING):  carbidopa/levodopa/entacapone 12.5/50/200 1 Tablet(s) Oral three times a day  dextrose 5%. 1000 milliLiter(s) (60 mL/Hr) IV Continuous <Continuous>  enalapril 20 milliGRAM(s) Oral two times a day  insulin lispro (ADMELOG) corrective regimen sliding scale   SubCutaneous Before meals and at bedtime  oxybutynin XL 5 milliGRAM(s) Oral daily  pantoprazole    Tablet 40 milliGRAM(s) Oral before breakfast  rasagiline Tablet 1 milliGRAM(s) Oral daily  senna 2 Tablet(s) Oral at bedtime    MEDICATIONS  (PRN):  acetaminophen   Tablet .. 650 milliGRAM(s) Oral every 6 hours PRN Temp greater or equal to 38C (100.4F), Moderate Pain (4 - 6)  ALBUTerol    90 MICROgram(s) HFA Inhaler 2 Puff(s) Inhalation every 6 hours PRN Shortness of Breath and/or Wheezing  ondansetron Injectable 4 milliGRAM(s) IV Push every 6 hours PRN Nausea and/or Vomiting  polyethylene glycol 3350 17 Gram(s) Oral daily PRN Constipation    REVIEW OF SYSTEMS:      CONSTITUTIONAL: No fever  EYES: no acute visual disturbances  NECK: No pain or stiffness  RESPIRATORY: No cough; No shortness of breath  CARDIOVASCULAR: No chest pain, no palpitations  GASTROINTESTINAL: No pain. No nausea, vomiting or diarrhea   NEUROLOGICAL: No headache or numbness, no tremors  MUSCULOSKELETAL: No joint pain, no muscle pain  GENITOURINARY: no dysuria, no frequency, no hesitancy  PSYCHIATRY: no depression, no anxiety  ALL OTHER  ROS negative      Vital Signs Last 24 Hrs  T(C): 36.5 (2021 14:01), Max: 36.5 (2021 14:01)  T(F): 97.7 (2021 14:01), Max: 97.7 (2021 14:01)  HR: 70 (2021 14:12) (63 - 75)  BP: 120/61 (2021 14:12) (90/50 - 120/61)  BP(mean): --  RR: 17 (2021 14:12) (17 - 18)  SpO2: 99% (2021 14:12) (99% - 100%)    ________________________________________________  PHYSICAL EXAM:    GENERAL: NAD  HEENT: Normocephalic; conjunctivae and sclerae clear;  NECK : supple, no JVD  CHEST/LUNG: Clear to auscultation  HEART: S1 S2  regular  ABDOMEN: Soft, Nontender, Nondistended; Bowel sounds present  EXTREMITIES: no cyanosis; no LE edema; no calf tenderness  SKIN: warm and dry  NERVOUS SYSTEM:  Alert; no new deficits    _________________________________________________  __________________________________________________    LABS:                        7.9    5.19  )-----------( 202      ( 2021 07:38 )             24.9     06-11    135  |  106  |  7   ----------------------------<  235<H>  3.5   |  23  |  0.60    Ca    8.2<L>      2021 07:38  Phos  2.8     06-11  Mg     1.6     06-11    TPro  5.1<L>  /  Alb  2.2<L>  /  TBili  0.5  /  DBili  x   /  AST  15  /  ALT  8<L>  /  AlkPhos  82  06-10    PT/INR - ( 10 Maynor 2021 05:36 )   PT: 12.3 sec;   INR: 1.04 ratio         PTT - ( 10 Maynor 2021 05:36 )  PTT:28.9 sec  Urinalysis Basic - ( 2021 17:19 )    Color: Yellow / Appearance: very cloudy / S.010 / pH: x  Gluc: x / Ketone: Negative  / Bili: Negative / Urobili: Negative   Blood: x / Protein: 100 / Nitrite: Negative   Leuk Esterase: Moderate / RBC: 10-25 /HPF / WBC >50 /HPF   Sq Epi: x / Non Sq Epi: Few /HPF / Bacteria: Many /HPF                        LABS:                          8.9    6.86  )-----------( 234      ( 10 Maynor 2021 05:36 )             28.1     06-10    140  |  109<H>  |  7   ----------------------------<  155<H>  4.1   |  27  |  0.73    Ca    8.8      10 Maynor 2021 05:36  Phos  3.2     06-09  Mg     1.9     06-09    TPro  5.1<L>  /  Alb  2.2<L>  /  TBili  0.5  /  DBili  x   /  AST  15  /  ALT  8<L>  /  AlkPhos  82  06-10    PT/INR - ( 10 Maynor 2021 05:36 )   PT: 12.3 sec;   INR: 1.04 ratio         PTT - ( 10 Maynor 2021 05:36 )  PTT:28.9 sec  Urinalysis Basic - ( 2021 17:19 )    Color: Yellow / Appearance: very cloudy / S.010 / pH: x  Gluc: x / Ketone: Negative  / Bili: Negative / Urobili: Negative   Blood: x / Protein: 100 / Nitrite: Negative   Leuk Esterase: Moderate / RBC: 10-25 /HPF / WBC >50 /HPF   Sq Epi: x / Non Sq Epi: Few /HPF / Bacteria: Many /HPF      CAPILLARY BLOOD GLUCOSE      POCT Blood Glucose.: 153 mg/dL (10 Maynor 2021 11:50)  POCT Blood Glucose.: 196 mg/dL (10 Maynor 2021 08:07)  POCT Blood Glucose.: 192 mg/dL (2021 20:58)  POCT Blood Glucose.: 134 mg/dL (2021 17:08)      __________________________________________________  RADIOLOGY & ADDITIONAL TESTS:    Imaging Personally Reviewed:  YES    < from: CT Abdomen and Pelvis w/ IV Cont (21 @ 10:11) >  LIVER: Multiple subcentimeter hepatic hypodensities, too small to further characterize.  BILE DUCTS: Normal caliber.  GALLBLADDER: Cholelithiasis.  SPLEEN: Within normal limits.  PANCREAS: Mildly atrophic.  ADRENALS: Within normal limits.  KIDNEYS/URETERS: Subcentimeter renal hypodensities, too small to further characterize. No hydronephrosis.    BLADDER: Within normal limits.  REPRODUCTIVE ORGANS: Uterus and left adnexa within normal limits. 3.7 x 2.8 cm right adnexal mass.    BOWEL: No bowel obstruction. Appendix is normal. Colonic diverticulosis. Rectal wall thickening.  PERITONEUM: Trace pelvic fluid.  VESSELS: Atherosclerotic changes. Filling defects noted within the left common femoral, femoral, and deep femoral veins.  RETROPERITONEUM/LYMPH NODES: No lymphadenopathy.  ABDOMINAL WALL: Small fat-containing left inguinal hernia.  BONES: Degenerative changes. Severe compression deformity of L2. Fixation cement noted within the L2 vertebral body. Mild wedge deformity of T11.    IMPRESSION:    1. Rectal wall thickening, question proctitis.  2. Colonic diverticulosis without CT evidence of acute diverticulitis. No bowel obstruction.  3. Right adnexal mass (3.7 x 2.8 cm). Recommend pelvic ultrasound for further assessment.  4. Deep venous thrombosis within the imaged left lower extremity (left common femoral, femoral, and deep femoral veins).    < end of copied text >    < from: US Pelvis Complete (US Pelvis Complete .) (21 @ 15:51) >  Uterus: 6.3 cm x 3.5 cm x 4.4 cm. 3.1 x 3.6 x 3.4 cm subserosal exophytic right uterine fibroid.  Endometrium: 4 mm. Within normal limits.    The ovaries are not visualized due to abundant bowel gas in the adnexa.    Sheppard catheter in the urinary bladder. Dependent debris in the urinary bladder.    Fluid: No pelvic free fluid.    IMPRESSION:  3.6 cm right uterine fibroid.    < end of copied text >    Consultant(s) Notes Reviewed:   YES     Plan of care was discussed with patient and /or primary care giver; all questions and concerns were addressed and care was aligned with patient's wishes.    Plan discussed with attending and consulting physicians.

## 2021-06-11 NOTE — PROGRESS NOTE ADULT - SUBJECTIVE AND OBJECTIVE BOX
[   ] ICU                                          [   ] CCU                                      [ X  ] Medical Floor    Patient is a 75 year old with rectal bleeding. GI consulted to evaluate.      Patient is a 75 year old female from [place], with past medical history significant for Parkinson's, COPD, diverticulosis, HTN, DM and HLD, who presented to the ED due to rectal bleed. History is limited from patient due to her being a poor historian. As per ED provider note, patient was sent to the ED for concern for vaginal bleed. In ED, patient is noted to have rectal bleed instead of vaginal bleed. Patient is confused unable to provide any history. No abdominal pain, nausea, vomiting, hematemesis, melena, fever, chills, chest pain, SOB, cough, hematuria, dysuria or diarrhea reported.       Today patient appears comfortable with no new complaints. No abdominal pain, N/V, hematemesis, hematochezia, melena, fever, chills, chest pain, SOB, cough or diarrhea reported.    PAIN MANAGEMENT:  Pain Scale:                0/10  Pain Location:      Prior Colonoscopy: Unknown    PAST MEDICAL HISTORY  Parkinson disease  COPD  Diverticulosis  HTN (hypertension)  Diabetes mellitus  HLD (hyperlipidemia)      PAST SURGICAL HISTORY  No significant past surgical history      Allergies    No Known Allergies    Intolerances  None        SOCIAL HISTORY  Advanced Directives:       [ X ] Full Code       [  ] DNR  Marital Status:         [  ] M      [ X ] S      [  ] D       [  ] W  Children:       [ X ] Yes      [  ] No  Occupation:        [  ] Employed       [ X ] Unemployed       [  ] Retired  Diet:       [ X ] Regular       [  ] PEG feeding          [  ] NG tube feeding  Drug Use:           [ X ] Patient denied          [  ] Yes  Alcohol:           [ X ] No             [  ] Yes (socially)         [  ] Yes (chronic)  Tobacco:           [  ] Yes           [ X ] No    FAMILY HISTORY  [ X ] Heart Disease            [ X ] Diabetes             [ X ] HTN             [  ] Colon Cancer             [  ] Stomach Cancer              [  ] Pancreatic Cancer      VITALS  Vital Signs Last 24 Hrs  T(C): 36.5 (11 Jun 2021 14:01), Max: 36.5 (11 Jun 2021 14:01)  T(F): 97.7 (11 Jun 2021 14:01), Max: 97.7 (11 Jun 2021 14:01)  HR: 70 (11 Jun 2021 14:12) (63 - 75)  BP: 120/61 (11 Jun 2021 14:12) (90/50 - 120/61)   RR: 17 (11 Jun 2021 14:12) (17 - 18)  SpO2: 99% (11 Jun 2021 14:12) (99% - 100%)       MEDICATIONS  (STANDING):  carbidopa/levodopa/entacapone 12.5/50/200 1 Tablet(s) Oral three times a day  dextrose 5%. 1000 milliLiter(s) (60 mL/Hr) IV Continuous <Continuous>  enalapril 20 milliGRAM(s) Oral two times a day  insulin lispro (ADMELOG) corrective regimen sliding scale   SubCutaneous Before meals and at bedtime  oxybutynin XL 5 milliGRAM(s) Oral daily  pantoprazole    Tablet 40 milliGRAM(s) Oral before breakfast  rasagiline Tablet 1 milliGRAM(s) Oral daily  senna 2 Tablet(s) Oral at bedtime    MEDICATIONS  (PRN):  acetaminophen   Tablet .. 650 milliGRAM(s) Oral every 6 hours PRN Temp greater or equal to 38C (100.4F), Moderate Pain (4 - 6)  ALBUTerol    90 MICROgram(s) HFA Inhaler 2 Puff(s) Inhalation every 6 hours PRN Shortness of Breath and/or Wheezing  ondansetron Injectable 4 milliGRAM(s) IV Push every 6 hours PRN Nausea and/or Vomiting  polyethylene glycol 3350 17 Gram(s) Oral daily PRN Constipation                            7.9    5.19  )-----------( 202      ( 11 Jun 2021 07:38 )             24.9       06-11    135  |  106  |  7   ----------------------------<  235<H>  3.5   |  23  |  0.60    Ca    8.2<L>      11 Jun 2021 07:38  Phos  2.8     06-11  Mg     1.6     06-11    TPro  5.1<L>  /  Alb  2.2<L>  /  TBili  0.5  /  DBili  x   /  AST  15  /  ALT  8<L>  /  AlkPhos  82  06-10      PT/INR - ( 10 Maynor 2021 05:36 )   PT: 12.3 sec;   INR: 1.04 ratio         PTT - ( 10 Maynor 2021 05:36 )  PTT:28.9 sec

## 2021-06-11 NOTE — PROGRESS NOTE ADULT - SUBJECTIVE AND OBJECTIVE BOX
had IVC filter placed  ?still has bleeding    MEDICATIONS  (STANDING):  carbidopa/levodopa/entacapone 12.5/50/200 1 Tablet(s) Oral three times a day  dextrose 5%. 1000 milliLiter(s) (60 mL/Hr) IV Continuous <Continuous>  enalapril 20 milliGRAM(s) Oral two times a day  insulin lispro (ADMELOG) corrective regimen sliding scale   SubCutaneous Before meals and at bedtime  oxybutynin XL 5 milliGRAM(s) Oral daily  pantoprazole    Tablet 40 milliGRAM(s) Oral before breakfast  rasagiline Tablet 1 milliGRAM(s) Oral daily  senna 2 Tablet(s) Oral at bedtime    MEDICATIONS  (PRN):  acetaminophen   Tablet .. 650 milliGRAM(s) Oral every 6 hours PRN Temp greater or equal to 38C (100.4F), Moderate Pain (4 - 6)  ALBUTerol    90 MICROgram(s) HFA Inhaler 2 Puff(s) Inhalation every 6 hours PRN Shortness of Breath and/or Wheezing  ondansetron Injectable 4 milliGRAM(s) IV Push every 6 hours PRN Nausea and/or Vomiting  polyethylene glycol 3350 17 Gram(s) Oral daily PRN Constipation      Allergies    No Known Allergies    Intolerances        Vital Signs Last 24 Hrs  T(C): 36.4 (11 Jun 2021 05:17), Max: 36.6 (10 Maynor 2021 13:19)  T(F): 97.5 (11 Jun 2021 05:17), Max: 97.8 (10 Maynor 2021 13:19)  HR: 67 (11 Jun 2021 05:17) (63 - 70)  BP: 110/71 (11 Jun 2021 05:17) (110/71 - 125/88)  BP(mean): --  RR: 18 (11 Jun 2021 05:17) (18 - 18)  SpO2: 100% (11 Jun 2021 05:17) (100% - 100%)    PHYSICAL EXAM  General: adult in NAD  HEENT: clear oropharynx, anicteric sclera, pink conjunctiva  Neck: supple  CV: normal S1/S2 with no murmur rubs or gallops  Lungs: positive air movement b/l ant lungs,clear to auscultation, no wheezes, no rales  Abdomen: soft non-tender non-distended, no hepatosplenomegaly  Ext: no clubbing cyanosis or edema  Skin: no rashes and no petechiae  Neuro: alert and oriented X 4, no focal deficits  LABS:                          7.9    5.19  )-----------( 202      ( 11 Jun 2021 07:38 )             24.9         Mean Cell Volume : 88.0 fl  Mean Cell Hemoglobin : 27.9 pg  Mean Cell Hemoglobin Concentration : 31.7 gm/dL  Auto Neutrophil # : x  Auto Lymphocyte # : x  Auto Monocyte # : x  Auto Eosinophil # : x  Auto Basophil # : x  Auto Neutrophil % : x  Auto Lymphocyte % : x  Auto Monocyte % : x  Auto Eosinophil % : x  Auto Basophil % : x    Serial CBC  Hematocrit 24.9  Hemoglobin 7.9  Plat 202  RBC 2.83  WBC 5.19  Serial CBC  Hematocrit 28.1  Hemoglobin 8.9  Plat 234  RBC 3.19  WBC 6.86  Serial CBC  Hematocrit 28.0  Hemoglobin 8.9  Plat 240  RBC 3.19  WBC 7.39  Serial CBC  Hematocrit 27.6  Hemoglobin 9.0  Plat 242  RBC 3.21  WBC 7.59  Serial CBC  Hematocrit 26.2  Hemoglobin 8.5  Plat 246  RBC 3.02  WBC 6.21    06-11    135  |  106  |  7   ----------------------------<  235<H>  3.5   |  23  |  0.60    Ca    8.2<L>      11 Jun 2021 07:38  Phos  2.8     06-11  Mg     1.6     06-11    TPro  5.1<L>  /  Alb  2.2<L>  /  TBili  0.5  /  DBili  x   /  AST  15  /  ALT  8<L>  /  AlkPhos  82  06-10      PT/INR - ( 10 Maynor 2021 05:36 )   PT: 12.3 sec;   INR: 1.04 ratio         PTT - ( 10 Maynor 2021 05:36 )  PTT:28.9 sec              BLOOD SMEAR INTERPRETATION:       RADIOLOGY & ADDITIONAL STUDIES:

## 2021-06-12 LAB
ANION GAP SERPL CALC-SCNC: 4 MMOL/L — LOW (ref 5–17)
BUN SERPL-MCNC: 6 MG/DL — LOW (ref 7–18)
CALCIUM SERPL-MCNC: 8.6 MG/DL — SIGNIFICANT CHANGE UP (ref 8.4–10.5)
CHLORIDE SERPL-SCNC: 110 MMOL/L — HIGH (ref 96–108)
CO2 SERPL-SCNC: 27 MMOL/L — SIGNIFICANT CHANGE UP (ref 22–31)
CREAT SERPL-MCNC: 0.69 MG/DL — SIGNIFICANT CHANGE UP (ref 0.5–1.3)
GLUCOSE BLDC GLUCOMTR-MCNC: 146 MG/DL — HIGH (ref 70–99)
GLUCOSE BLDC GLUCOMTR-MCNC: 181 MG/DL — HIGH (ref 70–99)
GLUCOSE BLDC GLUCOMTR-MCNC: 186 MG/DL — HIGH (ref 70–99)
GLUCOSE BLDC GLUCOMTR-MCNC: 221 MG/DL — HIGH (ref 70–99)
GLUCOSE SERPL-MCNC: 165 MG/DL — HIGH (ref 70–99)
HCT VFR BLD CALC: 27 % — LOW (ref 34.5–45)
HGB BLD-MCNC: 8.8 G/DL — LOW (ref 11.5–15.5)
MAGNESIUM SERPL-MCNC: 1.7 MG/DL — SIGNIFICANT CHANGE UP (ref 1.6–2.6)
MCHC RBC-ENTMCNC: 28.9 PG — SIGNIFICANT CHANGE UP (ref 27–34)
MCHC RBC-ENTMCNC: 32.6 GM/DL — SIGNIFICANT CHANGE UP (ref 32–36)
MCV RBC AUTO: 88.8 FL — SIGNIFICANT CHANGE UP (ref 80–100)
NRBC # BLD: 0 /100 WBCS — SIGNIFICANT CHANGE UP (ref 0–0)
PHOSPHATE SERPL-MCNC: 2.7 MG/DL — SIGNIFICANT CHANGE UP (ref 2.5–4.5)
PLATELET # BLD AUTO: 203 K/UL — SIGNIFICANT CHANGE UP (ref 150–400)
POTASSIUM SERPL-MCNC: 3.9 MMOL/L — SIGNIFICANT CHANGE UP (ref 3.5–5.3)
POTASSIUM SERPL-SCNC: 3.9 MMOL/L — SIGNIFICANT CHANGE UP (ref 3.5–5.3)
RBC # BLD: 3.04 M/UL — LOW (ref 3.8–5.2)
RBC # FLD: 16.7 % — HIGH (ref 10.3–14.5)
SODIUM SERPL-SCNC: 141 MMOL/L — SIGNIFICANT CHANGE UP (ref 135–145)
WBC # BLD: 4.8 K/UL — SIGNIFICANT CHANGE UP (ref 3.8–10.5)
WBC # FLD AUTO: 4.8 K/UL — SIGNIFICANT CHANGE UP (ref 3.8–10.5)

## 2021-06-12 RX ADMIN — SODIUM CHLORIDE 60 MILLILITER(S): 9 INJECTION, SOLUTION INTRAVENOUS at 21:28

## 2021-06-12 RX ADMIN — PANTOPRAZOLE SODIUM 40 MILLIGRAM(S): 20 TABLET, DELAYED RELEASE ORAL at 06:14

## 2021-06-12 RX ADMIN — Medication 20 MILLIGRAM(S): at 18:12

## 2021-06-12 RX ADMIN — Medication 20 MILLIGRAM(S): at 05:42

## 2021-06-12 RX ADMIN — Medication 1: at 21:28

## 2021-06-12 RX ADMIN — Medication 1: at 07:51

## 2021-06-12 RX ADMIN — CARBIDOPA, LEVODOPA, AND ENTACAPONE 1 TABLET(S): 50; 200; 200 TABLET, FILM COATED ORAL at 05:43

## 2021-06-12 RX ADMIN — RASAGILINE 1 MILLIGRAM(S): 0.5 TABLET ORAL at 11:30

## 2021-06-12 RX ADMIN — Medication 2: at 16:37

## 2021-06-12 RX ADMIN — SODIUM CHLORIDE 60 MILLILITER(S): 9 INJECTION, SOLUTION INTRAVENOUS at 15:00

## 2021-06-12 RX ADMIN — Medication 5 MILLIGRAM(S): at 11:29

## 2021-06-12 RX ADMIN — SENNA PLUS 2 TABLET(S): 8.6 TABLET ORAL at 21:29

## 2021-06-12 RX ADMIN — CARBIDOPA, LEVODOPA, AND ENTACAPONE 1 TABLET(S): 50; 200; 200 TABLET, FILM COATED ORAL at 14:53

## 2021-06-12 RX ADMIN — CARBIDOPA, LEVODOPA, AND ENTACAPONE 1 TABLET(S): 50; 200; 200 TABLET, FILM COATED ORAL at 21:28

## 2021-06-12 NOTE — PROGRESS NOTE ADULT - ASSESSMENT
1. Rectal bleeding stopped  2. Anemia  3. S/p Colonoscopy  4. Rectal ulcer  5. Diverticulosis  6. Large hemorrhoids  7. No evidence of acute GI bleeding at present time    Suggestions:    1. Monitor H/H  2. Transfuse PRBC as needed  3. Avoid NSAID  4. Protonix 40mg daily  5. DVT prophylaxis

## 2021-06-12 NOTE — PROGRESS NOTE ADULT - SUBJECTIVE AND OBJECTIVE BOX
[   ] ICU                                          [   ] CCU                                      [  X ] Medical Floor      Patient is a 75 year old with rectal bleeding. GI consulted to evaluate.      Patient is a 75 year old female from [place], with past medical history significant for Parkinson's, COPD, diverticulosis, HTN, DM and HLD, who presented to the ED due to rectal bleed. History is limited from patient due to her being a poor historian. As per ED provider note, patient was sent to the ED for concern for vaginal bleed. In ED, patient is noted to have rectal bleed instead of vaginal bleed. Patient is confused unable to provide any history. No abdominal pain, nausea, vomiting, hematemesis, melena, fever, chills, chest pain, SOB, cough, hematuria, dysuria or diarrhea reported.       Today patient appears comfortable with no new complaints. No abdominal pain, N/V, hematemesis, hematochezia, melena, fever, chills, chest pain, SOB, cough or diarrhea reported.    PAIN MANAGEMENT:  Pain Scale:                0/10  Pain Location:      Prior Colonoscopy: Unknown    PAST MEDICAL HISTORY  Parkinson disease  COPD  Diverticulosis  HTN (hypertension)  Diabetes mellitus  HLD (hyperlipidemia)      PAST SURGICAL HISTORY  No significant past surgical history      Allergies    No Known Allergies    Intolerances  None        SOCIAL HISTORY  Advanced Directives:       [ X ] Full Code       [  ] DNR  Marital Status:         [  ] M      [ X ] S      [  ] D       [  ] W  Children:       [ X ] Yes      [  ] No  Occupation:        [  ] Employed       [ X ] Unemployed       [  ] Retired  Diet:       [ X ] Regular       [  ] PEG feeding          [  ] NG tube feeding  Drug Use:           [ X ] Patient denied          [  ] Yes  Alcohol:           [ X ] No             [  ] Yes (socially)         [  ] Yes (chronic)  Tobacco:           [  ] Yes           [ X ] No    FAMILY HISTORY  [ X ] Heart Disease            [ X ] Diabetes             [ X ] HTN             [  ] Colon Cancer             [  ] Stomach Cancer              [  ] Pancreatic Cancer        VITALS  Vital Signs Last 24 Hrs  T(C): 36.7 (12 Jun 2021 05:33), Max: 36.7 (12 Jun 2021 05:33)  T(F): 98 (12 Jun 2021 05:33), Max: 98 (12 Jun 2021 05:33)  HR: 75 (12 Jun 2021 05:33) (70 - 86)  BP: 142/65 (12 Jun 2021 05:33) (90/50 - 142/65)   RR: 18 (12 Jun 2021 05:33) (17 - 18)  SpO2: 95% (12 Jun 2021 05:33) (95% - 100%)       MEDICATIONS  (STANDING):  carbidopa/levodopa/entacapone 12.5/50/200 1 Tablet(s) Oral three times a day  dextrose 5%. 1000 milliLiter(s) (60 mL/Hr) IV Continuous <Continuous>  enalapril 20 milliGRAM(s) Oral two times a day  insulin lispro (ADMELOG) corrective regimen sliding scale   SubCutaneous Before meals and at bedtime  oxybutynin XL 5 milliGRAM(s) Oral daily  pantoprazole    Tablet 40 milliGRAM(s) Oral before breakfast  rasagiline Tablet 1 milliGRAM(s) Oral daily  senna 2 Tablet(s) Oral at bedtime    MEDICATIONS  (PRN):  acetaminophen   Tablet .. 650 milliGRAM(s) Oral every 6 hours PRN Temp greater or equal to 38C (100.4F), Moderate Pain (4 - 6)  ALBUTerol    90 MICROgram(s) HFA Inhaler 2 Puff(s) Inhalation every 6 hours PRN Shortness of Breath and/or Wheezing  ondansetron Injectable 4 milliGRAM(s) IV Push every 6 hours PRN Nausea and/or Vomiting  polyethylene glycol 3350 17 Gram(s) Oral daily PRN Constipation                            8.8    4.80  )-----------( 203      ( 12 Jun 2021 07:11 )             27.0       06-12    141  |  110<H>  |  6<L>  ----------------------------<  165<H>  3.9   |  27  |  0.69    Ca    8.6      12 Jun 2021 07:11  Phos  2.7     06-12  Mg     1.7     06-12

## 2021-06-12 NOTE — PROGRESS NOTE ADULT - SUBJECTIVE AND OBJECTIVE BOX
HPI:  75 YOF admitted with rectal bleeding.  CT revealed DVT, pt refused further imaging.  Pt rectal bleeding restarted, Hep gtt stopped, IR reconsulted for IVC filter placement.  Pt to have filter placed     OVERNIGHT EVENTS:  No new overnight events   MEDICATIONS  (STANDING):  carbidopa/levodopa/entacapone 12.5/50/200 1 Tablet(s) Oral three times a day  dextrose 5%. 1000 milliLiter(s) (60 mL/Hr) IV Continuous <Continuous>  enalapril 20 milliGRAM(s) Oral two times a day  insulin lispro (ADMELOG) corrective regimen sliding scale   SubCutaneous Before meals and at bedtime  oxybutynin XL 5 milliGRAM(s) Oral daily  pantoprazole    Tablet 40 milliGRAM(s) Oral before breakfast  rasagiline Tablet 1 milliGRAM(s) Oral daily  senna 2 Tablet(s) Oral at bedtime    MEDICATIONS  (PRN):  acetaminophen   Tablet .. 650 milliGRAM(s) Oral every 6 hours PRN Temp greater or equal to 38C (100.4F), Moderate Pain (4 - 6)  ALBUTerol    90 MICROgram(s) HFA Inhaler 2 Puff(s) Inhalation every 6 hours PRN Shortness of Breath and/or Wheezing  ondansetron Injectable 4 milliGRAM(s) IV Push every 6 hours PRN Nausea and/or Vomiting  polyethylene glycol 3350 17 Gram(s) Oral daily PRN Constipation    REVIEW OF SYSTEMS:      CONSTITUTIONAL: No fever  EYES: no acute visual disturbances  NECK: No pain or stiffness  RESPIRATORY: No cough; No shortness of breath  CARDIOVASCULAR: No chest pain, no palpitations  GASTROINTESTINAL: No pain. No nausea, vomiting or diarrhea   NEUROLOGICAL: No headache or numbness, no tremors  MUSCULOSKELETAL: No joint pain, no muscle pain  GENITOURINARY: no dysuria, no frequency, no hesitancy  PSYCHIATRY: no depression, no anxiety  ALL OTHER  ROS negative      Vital Signs Last 24 Hrs  T(C): 36.5 (2021 13:58), Max: 36.7 (2021 05:33)  T(F): 97.7 (2021 13:58), Max: 98 (2021 05:33)  HR: 93 (2021 13:58) (70 - 93)  BP: 120/64 (2021 13:58) (90/50 - 142/65)  BP(mean): --  RR: 17 (2021 13:58) (17 - 18)  SpO2: 98% (2021 13:58) (95% - 100%)      ________________________________________________  PHYSICAL EXAM:    GENERAL: NAD  HEENT: Normocephalic; conjunctivae and sclerae clear;  NECK : supple, no JVD  CHEST/LUNG: Clear to auscultation  HEART: S1 S2  regular  ABDOMEN: Soft, Nontender, Nondistended; Bowel sounds present  EXTREMITIES: no cyanosis; no LE edema; no calf tenderness  SKIN: warm and dry  NERVOUS SYSTEM:  Alert; no new deficits    _________________________________________________  __________________________________________________      LABS:                        8.8    4.80  )-----------( 203      ( 2021 07:11 )             27.0     06-12    141  |  110<H>  |  6<L>  ----------------------------<  165<H>  3.9   |  27  |  0.69    Ca    8.6      2021 07:11  Phos  2.7     06-12  Mg     1.7     06-12                              LABS:                        7.9    5.19  )-----------( 202      ( 2021 07:38 )             24.9     06-11    135  |  106  |  7   ----------------------------<  235<H>  3.5   |  23  |  0.60    Ca    8.2<L>      2021 07:38  Phos  2.8     06-11  Mg     1.6     06-11    TPro  5.1<L>  /  Alb  2.2<L>  /  TBili  0.5  /  DBili  x   /  AST  15  /  ALT  8<L>  /  AlkPhos  82  06-10    PT/INR - ( 10 Maynor 2021 05:36 )   PT: 12.3 sec;   INR: 1.04 ratio         PTT - ( 10 Maynor 2021 05:36 )  PTT:28.9 sec  Urinalysis Basic - ( 2021 17:19 )    Color: Yellow / Appearance: very cloudy / S.010 / pH: x  Gluc: x / Ketone: Negative  / Bili: Negative / Urobili: Negative   Blood: x / Protein: 100 / Nitrite: Negative   Leuk Esterase: Moderate / RBC: 10-25 /HPF / WBC >50 /HPF   Sq Epi: x / Non Sq Epi: Few /HPF / Bacteria: Many /HPF                        LABS:                          8.9    6.86  )-----------( 234      ( 10 Maynor 2021 05:36 )             28.1     06-10    140  |  109<H>  |  7   ----------------------------<  155<H>  4.1   |  27  |  0.73    Ca    8.8      10 Maynor 2021 05:36  Phos  3.2     06-09  Mg     1.9     06-09    TPro  5.1<L>  /  Alb  2.2<L>  /  TBili  0.5  /  DBili  x   /  AST  15  /  ALT  8<L>  /  AlkPhos  82  06-10    PT/INR - ( 10 Maynor 2021 05:36 )   PT: 12.3 sec;   INR: 1.04 ratio         PTT - ( 10 Maynor 2021 05:36 )  PTT:28.9 sec  Urinalysis Basic - ( 2021 17:19 )    Color: Yellow / Appearance: very cloudy / S.010 / pH: x  Gluc: x / Ketone: Negative  / Bili: Negative / Urobili: Negative   Blood: x / Protein: 100 / Nitrite: Negative   Leuk Esterase: Moderate / RBC: 10-25 /HPF / WBC >50 /HPF   Sq Epi: x / Non Sq Epi: Few /HPF / Bacteria: Many /HPF      CAPILLARY BLOOD GLUCOSE      POCT Blood Glucose.: 153 mg/dL (10 Maynor 2021 11:50)  POCT Blood Glucose.: 196 mg/dL (10 Maynor 2021 08:07)  POCT Blood Glucose.: 192 mg/dL (2021 20:58)  POCT Blood Glucose.: 134 mg/dL (2021 17:08)      __________________________________________________  RADIOLOGY & ADDITIONAL TESTS:    Imaging Personally Reviewed:  YES    < from: CT Abdomen and Pelvis w/ IV Cont (21 @ 10:11) >  LIVER: Multiple subcentimeter hepatic hypodensities, too small to further characterize.  BILE DUCTS: Normal caliber.  GALLBLADDER: Cholelithiasis.  SPLEEN: Within normal limits.  PANCREAS: Mildly atrophic.  ADRENALS: Within normal limits.  KIDNEYS/URETERS: Subcentimeter renal hypodensities, too small to further characterize. No hydronephrosis.    BLADDER: Within normal limits.  REPRODUCTIVE ORGANS: Uterus and left adnexa within normal limits. 3.7 x 2.8 cm right adnexal mass.    BOWEL: No bowel obstruction. Appendix is normal. Colonic diverticulosis. Rectal wall thickening.  PERITONEUM: Trace pelvic fluid.  VESSELS: Atherosclerotic changes. Filling defects noted within the left common femoral, femoral, and deep femoral veins.  RETROPERITONEUM/LYMPH NODES: No lymphadenopathy.  ABDOMINAL WALL: Small fat-containing left inguinal hernia.  BONES: Degenerative changes. Severe compression deformity of L2. Fixation cement noted within the L2 vertebral body. Mild wedge deformity of T11.    IMPRESSION:    1. Rectal wall thickening, question proctitis.  2. Colonic diverticulosis without CT evidence of acute diverticulitis. No bowel obstruction.  3. Right adnexal mass (3.7 x 2.8 cm). Recommend pelvic ultrasound for further assessment.  4. Deep venous thrombosis within the imaged left lower extremity (left common femoral, femoral, and deep femoral veins).    < end of copied text >    < from: US Pelvis Complete (US Pelvis Complete .) (21 @ 15:51) >  Uterus: 6.3 cm x 3.5 cm x 4.4 cm. 3.1 x 3.6 x 3.4 cm subserosal exophytic right uterine fibroid.  Endometrium: 4 mm. Within normal limits.    The ovaries are not visualized due to abundant bowel gas in the adnexa.    Sheppard catheter in the urinary bladder. Dependent debris in the urinary bladder.    Fluid: No pelvic free fluid.    IMPRESSION:  3.6 cm right uterine fibroid.    < end of copied text >    Consultant(s) Notes Reviewed:   YES     Plan of care was discussed with patient and /or primary care giver; all questions and concerns were addressed and care was aligned with patient's wishes.    Plan discussed with attending and consulting physicians.

## 2021-06-13 LAB
ANION GAP SERPL CALC-SCNC: 2 MMOL/L — LOW (ref 5–17)
BUN SERPL-MCNC: 7 MG/DL — SIGNIFICANT CHANGE UP (ref 7–18)
CALCIUM SERPL-MCNC: 8.7 MG/DL — SIGNIFICANT CHANGE UP (ref 8.4–10.5)
CHLORIDE SERPL-SCNC: 108 MMOL/L — SIGNIFICANT CHANGE UP (ref 96–108)
CO2 SERPL-SCNC: 28 MMOL/L — SIGNIFICANT CHANGE UP (ref 22–31)
CREAT SERPL-MCNC: 0.66 MG/DL — SIGNIFICANT CHANGE UP (ref 0.5–1.3)
GLUCOSE BLDC GLUCOMTR-MCNC: 151 MG/DL — HIGH (ref 70–99)
GLUCOSE BLDC GLUCOMTR-MCNC: 181 MG/DL — HIGH (ref 70–99)
GLUCOSE BLDC GLUCOMTR-MCNC: 189 MG/DL — HIGH (ref 70–99)
GLUCOSE BLDC GLUCOMTR-MCNC: 195 MG/DL — HIGH (ref 70–99)
GLUCOSE BLDC GLUCOMTR-MCNC: 236 MG/DL — HIGH (ref 70–99)
GLUCOSE SERPL-MCNC: 175 MG/DL — HIGH (ref 70–99)
HCT VFR BLD CALC: 27.2 % — LOW (ref 34.5–45)
HGB BLD-MCNC: 8.9 G/DL — LOW (ref 11.5–15.5)
MAGNESIUM SERPL-MCNC: 1.7 MG/DL — SIGNIFICANT CHANGE UP (ref 1.6–2.6)
MCHC RBC-ENTMCNC: 28.5 PG — SIGNIFICANT CHANGE UP (ref 27–34)
MCHC RBC-ENTMCNC: 32.7 GM/DL — SIGNIFICANT CHANGE UP (ref 32–36)
MCV RBC AUTO: 87.2 FL — SIGNIFICANT CHANGE UP (ref 80–100)
NRBC # BLD: 0 /100 WBCS — SIGNIFICANT CHANGE UP (ref 0–0)
PHOSPHATE SERPL-MCNC: 2.9 MG/DL — SIGNIFICANT CHANGE UP (ref 2.5–4.5)
PLATELET # BLD AUTO: 205 K/UL — SIGNIFICANT CHANGE UP (ref 150–400)
POTASSIUM SERPL-MCNC: 4 MMOL/L — SIGNIFICANT CHANGE UP (ref 3.5–5.3)
POTASSIUM SERPL-SCNC: 4 MMOL/L — SIGNIFICANT CHANGE UP (ref 3.5–5.3)
RBC # BLD: 3.12 M/UL — LOW (ref 3.8–5.2)
RBC # FLD: 16.7 % — HIGH (ref 10.3–14.5)
SODIUM SERPL-SCNC: 138 MMOL/L — SIGNIFICANT CHANGE UP (ref 135–145)
WBC # BLD: 5.76 K/UL — SIGNIFICANT CHANGE UP (ref 3.8–10.5)
WBC # FLD AUTO: 5.76 K/UL — SIGNIFICANT CHANGE UP (ref 3.8–10.5)

## 2021-06-13 RX ADMIN — Medication 1: at 22:59

## 2021-06-13 RX ADMIN — CARBIDOPA, LEVODOPA, AND ENTACAPONE 1 TABLET(S): 50; 200; 200 TABLET, FILM COATED ORAL at 22:48

## 2021-06-13 RX ADMIN — CARBIDOPA, LEVODOPA, AND ENTACAPONE 1 TABLET(S): 50; 200; 200 TABLET, FILM COATED ORAL at 14:41

## 2021-06-13 RX ADMIN — CARBIDOPA, LEVODOPA, AND ENTACAPONE 1 TABLET(S): 50; 200; 200 TABLET, FILM COATED ORAL at 06:32

## 2021-06-13 RX ADMIN — Medication 1: at 16:42

## 2021-06-13 RX ADMIN — Medication 5 MILLIGRAM(S): at 12:08

## 2021-06-13 RX ADMIN — SODIUM CHLORIDE 60 MILLILITER(S): 9 INJECTION, SOLUTION INTRAVENOUS at 06:33

## 2021-06-13 RX ADMIN — Medication 1: at 12:07

## 2021-06-13 RX ADMIN — Medication 1: at 07:57

## 2021-06-13 RX ADMIN — Medication 20 MILLIGRAM(S): at 06:33

## 2021-06-13 RX ADMIN — RASAGILINE 1 MILLIGRAM(S): 0.5 TABLET ORAL at 12:07

## 2021-06-13 RX ADMIN — SENNA PLUS 2 TABLET(S): 8.6 TABLET ORAL at 22:48

## 2021-06-13 RX ADMIN — SODIUM CHLORIDE 60 MILLILITER(S): 9 INJECTION, SOLUTION INTRAVENOUS at 22:49

## 2021-06-13 RX ADMIN — PANTOPRAZOLE SODIUM 40 MILLIGRAM(S): 20 TABLET, DELAYED RELEASE ORAL at 06:34

## 2021-06-13 NOTE — PROGRESS NOTE ADULT - SUBJECTIVE AND OBJECTIVE BOX
HPI:  75 YOF admitted with rectal bleeding.  CT revealed DVT    OVERNIGHT EVENTS:  No new overnight events   MEDICATIONS  (STANDING):  carbidopa/levodopa/entacapone 12.5/50/200 1 Tablet(s) Oral three times a day  dextrose 5%. 1000 milliLiter(s) (60 mL/Hr) IV Continuous <Continuous>  enalapril 20 milliGRAM(s) Oral two times a day  insulin lispro (ADMELOG) corrective regimen sliding scale   SubCutaneous Before meals and at bedtime  oxybutynin XL 5 milliGRAM(s) Oral daily  pantoprazole    Tablet 40 milliGRAM(s) Oral before breakfast  rasagiline Tablet 1 milliGRAM(s) Oral daily  senna 2 Tablet(s) Oral at bedtime    MEDICATIONS  (PRN):  acetaminophen   Tablet .. 650 milliGRAM(s) Oral every 6 hours PRN Temp greater or equal to 38C (100.4F), Moderate Pain (4 - 6)  ALBUTerol    90 MICROgram(s) HFA Inhaler 2 Puff(s) Inhalation every 6 hours PRN Shortness of Breath and/or Wheezing  ondansetron Injectable 4 milliGRAM(s) IV Push every 6 hours PRN Nausea and/or Vomiting  polyethylene glycol 3350 17 Gram(s) Oral daily PRN Constipation    REVIEW OF SYSTEMS:      CONSTITUTIONAL: No fever  EYES: no acute visual disturbances  NECK: No pain or stiffness  RESPIRATORY: No cough; No shortness of breath  CARDIOVASCULAR: No chest pain, no palpitations  GASTROINTESTINAL: No pain. No nausea, vomiting or diarrhea   NEUROLOGICAL: No headache or numbness, no tremors  MUSCULOSKELETAL: No joint pain, no muscle pain  GENITOURINARY: no dysuria, no frequency, no hesitancy  PSYCHIATRY: no depression, no anxiety  ALL OTHER  ROS negative      Vital Signs Last 24 Hrs  T(C): 36.7 (2021 05:57), Max: 36.7 (2021 05:57)  T(F): 98 (2021 05:57), Max: 98 (2021 05:57)  HR: 74 (2021 05:57) (72 - 93)  BP: 132/69 (2021 05:57) (120/64 - 133/67)  BP(mean): --  RR: 18 (2021 05:57) (17 - 18)  SpO2: 99% (2021 05:57) (98% - 99%)  ________________________________________________  PHYSICAL EXAM:    GENERAL: NAD  HEENT: Normocephalic; conjunctivae and sclerae clear;  NECK : supple, no JVD  CHEST/LUNG: Clear to auscultation  HEART: S1 S2  regular  ABDOMEN: Soft, Nontender, Nondistended; Bowel sounds present  EXTREMITIES: no cyanosis; no LE edema; no calf tenderness  SKIN: warm and dry  NERVOUS SYSTEM:  Alert; no new deficits    _________________________________________________  __________________________________________________    LABS:                        8.9    5.76  )-----------( 205      ( 2021 06:08 )             27.2     06-13    138  |  108  |  7   ----------------------------<  175<H>  4.0   |  28  |  0.66    Ca    8.7      2021 06:08  Phos  2.9     06-13  Mg     1.7     -13                            LABS:                        8.8    4.80  )-----------( 203      ( 2021 07:11 )             27.0     06-12    141  |  110<H>  |  6<L>  ----------------------------<  165<H>  3.9   |  27  |  0.69    Ca    8.6      2021 07:11  Phos  2.7     06-12  Mg     1.7     06-12                              LABS:                        7.9    5.19  )-----------( 202      ( 2021 07:38 )             24.9     06-11    135  |  106  |  7   ----------------------------<  235<H>  3.5   |  23  |  0.60    Ca    8.2<L>      2021 07:38  Phos  2.8     06-11  Mg     1.6     06-11    TPro  5.1<L>  /  Alb  2.2<L>  /  TBili  0.5  /  DBili  x   /  AST  15  /  ALT  8<L>  /  AlkPhos  82  06-10    PT/INR - ( 10 Maynor 2021 05:36 )   PT: 12.3 sec;   INR: 1.04 ratio         PTT - ( 10 Maynor 2021 05:36 )  PTT:28.9 sec  Urinalysis Basic - ( 2021 17:19 )    Color: Yellow / Appearance: very cloudy / S.010 / pH: x  Gluc: x / Ketone: Negative  / Bili: Negative / Urobili: Negative   Blood: x / Protein: 100 / Nitrite: Negative   Leuk Esterase: Moderate / RBC: 10-25 /HPF / WBC >50 /HPF   Sq Epi: x / Non Sq Epi: Few /HPF / Bacteria: Many /HPF                        LABS:                          8.9    6.86  )-----------( 234      ( 10 Maynor 2021 05:36 )             28.1     06-10    140  |  109<H>  |  7   ----------------------------<  155<H>  4.1   |  27  |  0.73    Ca    8.8      10 Maynor 2021 05:36  Phos  3.2     06-09  Mg     1.9     06-09    TPro  5.1<L>  /  Alb  2.2<L>  /  TBili  0.5  /  DBili  x   /  AST  15  /  ALT  8<L>  /  AlkPhos  82  06-10    PT/INR - ( 10 Maynor 2021 05:36 )   PT: 12.3 sec;   INR: 1.04 ratio         PTT - ( 10 Maynor 2021 05:36 )  PTT:28.9 sec  Urinalysis Basic - ( 2021 17:19 )    Color: Yellow / Appearance: very cloudy / S.010 / pH: x  Gluc: x / Ketone: Negative  / Bili: Negative / Urobili: Negative   Blood: x / Protein: 100 / Nitrite: Negative   Leuk Esterase: Moderate / RBC: 10-25 /HPF / WBC >50 /HPF   Sq Epi: x / Non Sq Epi: Few /HPF / Bacteria: Many /HPF      CAPILLARY BLOOD GLUCOSE      POCT Blood Glucose.: 153 mg/dL (10 Maynor 2021 11:50)  POCT Blood Glucose.: 196 mg/dL (10 Maynor 2021 08:07)  POCT Blood Glucose.: 192 mg/dL (2021 20:58)  POCT Blood Glucose.: 134 mg/dL (2021 17:08)      __________________________________________________  RADIOLOGY & ADDITIONAL TESTS:    Imaging Personally Reviewed:  YES    < from: CT Abdomen and Pelvis w/ IV Cont (21 @ 10:11) >  LIVER: Multiple subcentimeter hepatic hypodensities, too small to further characterize.  BILE DUCTS: Normal caliber.  GALLBLADDER: Cholelithiasis.  SPLEEN: Within normal limits.  PANCREAS: Mildly atrophic.  ADRENALS: Within normal limits.  KIDNEYS/URETERS: Subcentimeter renal hypodensities, too small to further characterize. No hydronephrosis.    BLADDER: Within normal limits.  REPRODUCTIVE ORGANS: Uterus and left adnexa within normal limits. 3.7 x 2.8 cm right adnexal mass.    BOWEL: No bowel obstruction. Appendix is normal. Colonic diverticulosis. Rectal wall thickening.  PERITONEUM: Trace pelvic fluid.  VESSELS: Atherosclerotic changes. Filling defects noted within the left common femoral, femoral, and deep femoral veins.  RETROPERITONEUM/LYMPH NODES: No lymphadenopathy.  ABDOMINAL WALL: Small fat-containing left inguinal hernia.  BONES: Degenerative changes. Severe compression deformity of L2. Fixation cement noted within the L2 vertebral body. Mild wedge deformity of T11.    IMPRESSION:    1. Rectal wall thickening, question proctitis.  2. Colonic diverticulosis without CT evidence of acute diverticulitis. No bowel obstruction.  3. Right adnexal mass (3.7 x 2.8 cm). Recommend pelvic ultrasound for further assessment.  4. Deep venous thrombosis within the imaged left lower extremity (left common femoral, femoral, and deep femoral veins).    < end of copied text >    < from: US Pelvis Complete (US Pelvis Complete .) (21 @ 15:51) >  Uterus: 6.3 cm x 3.5 cm x 4.4 cm. 3.1 x 3.6 x 3.4 cm subserosal exophytic right uterine fibroid.  Endometrium: 4 mm. Within normal limits.    The ovaries are not visualized due to abundant bowel gas in the adnexa.    Sheppard catheter in the urinary bladder. Dependent debris in the urinary bladder.    Fluid: No pelvic free fluid.    IMPRESSION:  3.6 cm right uterine fibroid.    < end of copied text >    Consultant(s) Notes Reviewed:   YES     Plan of care was discussed with patient and /or primary care giver; all questions and concerns were addressed and care was aligned with patient's wishes.    Plan discussed with attending and consulting physicians.

## 2021-06-13 NOTE — PROGRESS NOTE ADULT - SUBJECTIVE AND OBJECTIVE BOX
[   ] ICU                                          [   ] CCU                                      [ X  ] Medical Floor      Patient is a 75 year old with rectal bleeding. GI consulted to evaluate.      Patient is a 75 year old female from [place], with past medical history significant for Parkinson's, COPD, diverticulosis, HTN, DM and HLD, who presented to the ED due to rectal bleed. History is limited from patient due to her being a poor historian. As per ED provider note, patient was sent to the ED for concern for vaginal bleed. In ED, patient is noted to have rectal bleed instead of vaginal bleed. Patient is confused unable to provide any history. No abdominal pain, nausea, vomiting, hematemesis, melena, fever, chills, chest pain, SOB, cough, hematuria, dysuria or diarrhea reported.       Today patient appears comfortable with no new complaints. No abdominal pain, N/V, hematemesis, hematochezia, melena, fever, chills, chest pain, SOB, cough or diarrhea reported.    PAIN MANAGEMENT:  Pain Scale:                0/10  Pain Location:      Prior Colonoscopy: Unknown    PAST MEDICAL HISTORY  Parkinson disease  COPD  Diverticulosis  HTN (hypertension)  Diabetes mellitus  HLD (hyperlipidemia)      PAST SURGICAL HISTORY  No significant past surgical history      Allergies    No Known Allergies    Intolerances  None        SOCIAL HISTORY  Advanced Directives:       [ X ] Full Code       [  ] DNR  Marital Status:         [  ] M      [ X ] S      [  ] D       [  ] W  Children:       [ X ] Yes      [  ] No  Occupation:        [  ] Employed       [ X ] Unemployed       [  ] Retired  Diet:       [ X ] Regular       [  ] PEG feeding          [  ] NG tube feeding  Drug Use:           [ X ] Patient denied          [  ] Yes  Alcohol:           [ X ] No             [  ] Yes (socially)         [  ] Yes (chronic)  Tobacco:           [  ] Yes           [ X ] No    FAMILY HISTORY  [ X ] Heart Disease            [ X ] Diabetes             [ X ] HTN             [  ] Colon Cancer             [  ] Stomach Cancer              [  ] Pancreatic Cancer      VITALS  Vital Signs Last 24 Hrs  T(C): 36.7 (13 Jun 2021 18:31), Max: 36.7 (13 Jun 2021 05:57)  T(F): 98.1 (13 Jun 2021 18:31), Max: 98.1 (13 Jun 2021 18:31)  HR: 74 (13 Jun 2021 18:31) (64 - 74)  BP: 100/59 (13 Jun 2021 18:31) (100/59 - 133/67)   RR: 18 (13 Jun 2021 18:31) (18 - 18)  SpO2: 100% (13 Jun 2021 18:31) (99% - 100%)       MEDICATIONS  (STANDING):  carbidopa/levodopa/entacapone 12.5/50/200 1 Tablet(s) Oral three times a day  dextrose 5%. 1000 milliLiter(s) (60 mL/Hr) IV Continuous <Continuous>  enalapril 20 milliGRAM(s) Oral two times a day  insulin lispro (ADMELOG) corrective regimen sliding scale   SubCutaneous Before meals and at bedtime  oxybutynin XL 5 milliGRAM(s) Oral daily  pantoprazole    Tablet 40 milliGRAM(s) Oral before breakfast  rasagiline Tablet 1 milliGRAM(s) Oral daily  senna 2 Tablet(s) Oral at bedtime    MEDICATIONS  (PRN):  acetaminophen   Tablet .. 650 milliGRAM(s) Oral every 6 hours PRN Temp greater or equal to 38C (100.4F), Moderate Pain (4 - 6)  ALBUTerol    90 MICROgram(s) HFA Inhaler 2 Puff(s) Inhalation every 6 hours PRN Shortness of Breath and/or Wheezing  ondansetron Injectable 4 milliGRAM(s) IV Push every 6 hours PRN Nausea and/or Vomiting  polyethylene glycol 3350 17 Gram(s) Oral daily PRN Constipation                            8.9    5.76  )-----------( 205      ( 13 Jun 2021 06:08 )             27.2       06-13    138  |  108  |  7   ----------------------------<  175<H>  4.0   |  28  |  0.66    Ca    8.7      13 Jun 2021 06:08  Phos  2.9     06-13  Mg     1.7     06-13

## 2021-06-13 NOTE — PROGRESS NOTE ADULT - TIME BILLING
PATIENT WITH [facility] FOR ACUTE HOSPITAL CARE   s/p IVC FILTER  H/H STABLE  PT OT   WAITING [facility] AUTH FOR RETURN TO [facility]   CARE PLAN D/W  NP , CM AND PATIENT'S FAMILY

## 2021-06-14 LAB
ANION GAP SERPL CALC-SCNC: 7 MMOL/L — SIGNIFICANT CHANGE UP (ref 5–17)
BUN SERPL-MCNC: 10 MG/DL — SIGNIFICANT CHANGE UP (ref 7–18)
CALCIUM SERPL-MCNC: 8.5 MG/DL — SIGNIFICANT CHANGE UP (ref 8.4–10.5)
CHLORIDE SERPL-SCNC: 104 MMOL/L — SIGNIFICANT CHANGE UP (ref 96–108)
CO2 SERPL-SCNC: 25 MMOL/L — SIGNIFICANT CHANGE UP (ref 22–31)
CREAT SERPL-MCNC: 0.92 MG/DL — SIGNIFICANT CHANGE UP (ref 0.5–1.3)
GLUCOSE BLDC GLUCOMTR-MCNC: 159 MG/DL — HIGH (ref 70–99)
GLUCOSE BLDC GLUCOMTR-MCNC: 178 MG/DL — HIGH (ref 70–99)
GLUCOSE BLDC GLUCOMTR-MCNC: 186 MG/DL — HIGH (ref 70–99)
GLUCOSE BLDC GLUCOMTR-MCNC: 250 MG/DL — HIGH (ref 70–99)
GLUCOSE SERPL-MCNC: 200 MG/DL — HIGH (ref 70–99)
HCT VFR BLD CALC: 25.8 % — LOW (ref 34.5–45)
HGB BLD-MCNC: 8.5 G/DL — LOW (ref 11.5–15.5)
MAGNESIUM SERPL-MCNC: 1.7 MG/DL — SIGNIFICANT CHANGE UP (ref 1.6–2.6)
MCHC RBC-ENTMCNC: 29 PG — SIGNIFICANT CHANGE UP (ref 27–34)
MCHC RBC-ENTMCNC: 32.9 GM/DL — SIGNIFICANT CHANGE UP (ref 32–36)
MCV RBC AUTO: 88.1 FL — SIGNIFICANT CHANGE UP (ref 80–100)
NRBC # BLD: 0 /100 WBCS — SIGNIFICANT CHANGE UP (ref 0–0)
PHOSPHATE SERPL-MCNC: 2.7 MG/DL — SIGNIFICANT CHANGE UP (ref 2.5–4.5)
PLATELET # BLD AUTO: 204 K/UL — SIGNIFICANT CHANGE UP (ref 150–400)
POTASSIUM SERPL-MCNC: 3.9 MMOL/L — SIGNIFICANT CHANGE UP (ref 3.5–5.3)
POTASSIUM SERPL-SCNC: 3.9 MMOL/L — SIGNIFICANT CHANGE UP (ref 3.5–5.3)
RBC # BLD: 2.93 M/UL — LOW (ref 3.8–5.2)
RBC # FLD: 16.8 % — HIGH (ref 10.3–14.5)
SARS-COV-2 RNA SPEC QL NAA+PROBE: SIGNIFICANT CHANGE UP
SODIUM SERPL-SCNC: 136 MMOL/L — SIGNIFICANT CHANGE UP (ref 135–145)
WBC # BLD: 4.03 K/UL — SIGNIFICANT CHANGE UP (ref 3.8–10.5)
WBC # FLD AUTO: 4.03 K/UL — SIGNIFICANT CHANGE UP (ref 3.8–10.5)

## 2021-06-14 RX ORDER — FERROUS SULFATE 325(65) MG
325 TABLET ORAL DAILY
Refills: 0 | Status: DISCONTINUED | OUTPATIENT
Start: 2021-06-14 | End: 2021-06-21

## 2021-06-14 RX ORDER — ASCORBIC ACID 60 MG
500 TABLET,CHEWABLE ORAL DAILY
Refills: 0 | Status: DISCONTINUED | OUTPATIENT
Start: 2021-06-14 | End: 2021-06-21

## 2021-06-14 RX ADMIN — Medication 20 MILLIGRAM(S): at 17:42

## 2021-06-14 RX ADMIN — SENNA PLUS 2 TABLET(S): 8.6 TABLET ORAL at 21:12

## 2021-06-14 RX ADMIN — Medication 1: at 12:10

## 2021-06-14 RX ADMIN — CARBIDOPA, LEVODOPA, AND ENTACAPONE 1 TABLET(S): 50; 200; 200 TABLET, FILM COATED ORAL at 21:11

## 2021-06-14 RX ADMIN — Medication 1: at 21:12

## 2021-06-14 RX ADMIN — RASAGILINE 1 MILLIGRAM(S): 0.5 TABLET ORAL at 11:17

## 2021-06-14 RX ADMIN — Medication 2: at 08:14

## 2021-06-14 RX ADMIN — Medication 5 MILLIGRAM(S): at 11:17

## 2021-06-14 RX ADMIN — CARBIDOPA, LEVODOPA, AND ENTACAPONE 1 TABLET(S): 50; 200; 200 TABLET, FILM COATED ORAL at 06:09

## 2021-06-14 RX ADMIN — CARBIDOPA, LEVODOPA, AND ENTACAPONE 1 TABLET(S): 50; 200; 200 TABLET, FILM COATED ORAL at 13:47

## 2021-06-14 RX ADMIN — Medication 20 MILLIGRAM(S): at 06:10

## 2021-06-14 RX ADMIN — PANTOPRAZOLE SODIUM 40 MILLIGRAM(S): 20 TABLET, DELAYED RELEASE ORAL at 06:10

## 2021-06-14 RX ADMIN — Medication 1: at 17:38

## 2021-06-14 NOTE — PROGRESS NOTE ADULT - SUBJECTIVE AND OBJECTIVE BOX
HPI:  75 YOF admitted with rectal bleeding.  CT revealed DVT    OVERNIGHT EVENTS:  No new overnight events     MEDICATIONS  (STANDING):  carbidopa/levodopa/entacapone 12.5/50/200 1 Tablet(s) Oral three times a day  dextrose 5%. 1000 milliLiter(s) (60 mL/Hr) IV Continuous <Continuous>  enalapril 20 milliGRAM(s) Oral two times a day  insulin lispro (ADMELOG) corrective regimen sliding scale   SubCutaneous Before meals and at bedtime  oxybutynin XL 5 milliGRAM(s) Oral daily  pantoprazole    Tablet 40 milliGRAM(s) Oral before breakfast  rasagiline Tablet 1 milliGRAM(s) Oral daily  senna 2 Tablet(s) Oral at bedtime    MEDICATIONS  (PRN):  acetaminophen   Tablet .. 650 milliGRAM(s) Oral every 6 hours PRN Temp greater or equal to 38C (100.4F), Moderate Pain (4 - 6)  ALBUTerol    90 MICROgram(s) HFA Inhaler 2 Puff(s) Inhalation every 6 hours PRN Shortness of Breath and/or Wheezing  ondansetron Injectable 4 milliGRAM(s) IV Push every 6 hours PRN Nausea and/or Vomiting  polyethylene glycol 3350 17 Gram(s) Oral daily PRN Constipation    REVIEW OF SYSTEMS:      CONSTITUTIONAL: No fever  EYES: no acute visual disturbances  NECK: No pain or stiffness  RESPIRATORY: No cough; No shortness of breath  CARDIOVASCULAR: No chest pain, no palpitations  GASTROINTESTINAL: No pain. No nausea, vomiting or diarrhea   NEUROLOGICAL: No headache or numbness, no tremors  MUSCULOSKELETAL: No joint pain, no muscle pain  GENITOURINARY: no dysuria, no frequency, no hesitancy  PSYCHIATRY: no depression, no anxiety  ALL OTHER  ROS negative    Vital Signs Last 24 Hrs  T(C): 36.6 (2021 05:55), Max: 36.7 (2021 18:31)  T(F): 97.8 (2021 05:55), Max: 98.1 (2021 18:31)  HR: 74 (2021 05:55) (64 - 74)  BP: 132/57 (2021 05:55) (100/59 - 132/57)  BP(mean): --  RR: 18 (2021 05:55) (18 - 18)  SpO2: 100% (2021 05:55) (100% - 100%)    ________________________________________________  PHYSICAL EXAM:    GENERAL: NAD  HEENT: Normocephalic; conjunctivae and sclerae clear;  NECK : supple, no JVD  CHEST/LUNG: Clear to auscultation  HEART: S1 S2  regular  ABDOMEN: Soft, Nontender, Nondistended; Bowel sounds present  EXTREMITIES: no cyanosis; no LE edema; no calf tenderness  SKIN: warm and dry  NERVOUS SYSTEM:  Alert; no new deficits but not oriented    _________________________________________________  __________________________________________________  LABS:                        8.5    4.03  )-----------( 204      ( 2021 08:20 )             25.8     06-14    136  |  104  |  10  ----------------------------<  200<H>  3.9   |  25  |  0.92    Ca    8.5      2021 08:20  Phos  2.7     06-14  Mg     1.7     06-14                              LABS:                        8.9    5.76  )-----------( 205      ( 2021 06:08 )             27.2     06-13    138  |  108  |  7   ----------------------------<  175<H>  4.0   |  28  |  0.66    Ca    8.7      2021 06:08  Phos  2.9     06-13  Mg     1.7     06-13                            LABS:                        8.8    4.80  )-----------( 203      ( 2021 07:11 )             27.0     06-12    141  |  110<H>  |  6<L>  ----------------------------<  165<H>  3.9   |  27  |  0.69    Ca    8.6      2021 07:11  Phos  2.7     06-12  Mg     1.7     06-12                              LABS:                        7.9    5.19  )-----------( 202      ( 2021 07:38 )             24.9     06-11    135  |  106  |  7   ----------------------------<  235<H>  3.5   |  23  |  0.60    Ca    8.2<L>      2021 07:38  Phos  2.8     06-11  Mg     1.6     06-11    TPro  5.1<L>  /  Alb  2.2<L>  /  TBili  0.5  /  DBili  x   /  AST  15  /  ALT  8<L>  /  AlkPhos  82  06-10    PT/INR - ( 10 Maynor 2021 05:36 )   PT: 12.3 sec;   INR: 1.04 ratio         PTT - ( 10 Maynor 2021 05:36 )  PTT:28.9 sec  Urinalysis Basic - ( 2021 17:19 )    Color: Yellow / Appearance: very cloudy / S.010 / pH: x  Gluc: x / Ketone: Negative  / Bili: Negative / Urobili: Negative   Blood: x / Protein: 100 / Nitrite: Negative   Leuk Esterase: Moderate / RBC: 10-25 /HPF / WBC >50 /HPF   Sq Epi: x / Non Sq Epi: Few /HPF / Bacteria: Many /HPF                        LABS:                          8.9    6.86  )-----------( 234      ( 10 Maynor 2021 05:36 )             28.1     06-10    140  |  109<H>  |  7   ----------------------------<  155<H>  4.1   |  27  |  0.73    Ca    8.8      10 Maynor 2021 05:36  Phos  3.2     06-09  Mg     1.9     06-09    TPro  5.1<L>  /  Alb  2.2<L>  /  TBili  0.5  /  DBili  x   /  AST  15  /  ALT  8<L>  /  AlkPhos  82  06-10    PT/INR - ( 10 Maynor 2021 05:36 )   PT: 12.3 sec;   INR: 1.04 ratio         PTT - ( 10 Maynor 2021 05:36 )  PTT:28.9 sec  Urinalysis Basic - ( 2021 17:19 )    Color: Yellow / Appearance: very cloudy / S.010 / pH: x  Gluc: x / Ketone: Negative  / Bili: Negative / Urobili: Negative   Blood: x / Protein: 100 / Nitrite: Negative   Leuk Esterase: Moderate / RBC: 10-25 /HPF / WBC >50 /HPF   Sq Epi: x / Non Sq Epi: Few /HPF / Bacteria: Many /HPF      CAPILLARY BLOOD GLUCOSE      POCT Blood Glucose.: 153 mg/dL (10 Mayonr 2021 11:50)  POCT Blood Glucose.: 196 mg/dL (10 Maynor 2021 08:07)  POCT Blood Glucose.: 192 mg/dL (2021 20:58)  POCT Blood Glucose.: 134 mg/dL (2021 17:08)      __________________________________________________  RADIOLOGY & ADDITIONAL TESTS:    Imaging Personally Reviewed:  YES    < from: CT Abdomen and Pelvis w/ IV Cont (21 @ 10:11) >  LIVER: Multiple subcentimeter hepatic hypodensities, too small to further characterize.  BILE DUCTS: Normal caliber.  GALLBLADDER: Cholelithiasis.  SPLEEN: Within normal limits.  PANCREAS: Mildly atrophic.  ADRENALS: Within normal limits.  KIDNEYS/URETERS: Subcentimeter renal hypodensities, too small to further characterize. No hydronephrosis.    BLADDER: Within normal limits.  REPRODUCTIVE ORGANS: Uterus and left adnexa within normal limits. 3.7 x 2.8 cm right adnexal mass.    BOWEL: No bowel obstruction. Appendix is normal. Colonic diverticulosis. Rectal wall thickening.  PERITONEUM: Trace pelvic fluid.  VESSELS: Atherosclerotic changes. Filling defects noted within the left common femoral, femoral, and deep femoral veins.  RETROPERITONEUM/LYMPH NODES: No lymphadenopathy.  ABDOMINAL WALL: Small fat-containing left inguinal hernia.  BONES: Degenerative changes. Severe compression deformity of L2. Fixation cement noted within the L2 vertebral body. Mild wedge deformity of T11.    IMPRESSION:    1. Rectal wall thickening, question proctitis.  2. Colonic diverticulosis without CT evidence of acute diverticulitis. No bowel obstruction.  3. Right adnexal mass (3.7 x 2.8 cm). Recommend pelvic ultrasound for further assessment.  4. Deep venous thrombosis within the imaged left lower extremity (left common femoral, femoral, and deep femoral veins).    < end of copied text >    < from: US Pelvis Complete (US Pelvis Complete .) (21 @ 15:51) >  Uterus: 6.3 cm x 3.5 cm x 4.4 cm. 3.1 x 3.6 x 3.4 cm subserosal exophytic right uterine fibroid.  Endometrium: 4 mm. Within normal limits.    The ovaries are not visualized due to abundant bowel gas in the adnexa.    Sheppard catheter in the urinary bladder. Dependent debris in the urinary bladder.    Fluid: No pelvic free fluid.    IMPRESSION:  3.6 cm right uterine fibroid.    < end of copied text >    Consultant(s) Notes Reviewed:   YES     Plan of care was discussed with patient and /or primary care giver; all questions and concerns were addressed and care was aligned with patient's wishes.    Plan discussed with attending and consulting physicians.

## 2021-06-14 NOTE — PROGRESS NOTE ADULT - ASSESSMENT
· Assessment	  75 year old lady presented with rectal bleeding.  CT showed rectal thickening, adnexal mass, and DVT.    1. rectal bleeding. will do sigmoidoscopy  please give her enema  discussed with Dr. Leonor herr prep  large hemorrhoid, and rectl ulcer  cauterized  no bleeding in last 2 days    2. adnexal mass  will do transvaginal ultrasound  check ca 125    3. DVT  not on AC yet due to rectal bleeding  for sigmoidoscopy  she has rectal bleeding again while on heparin  IVC filter placed

## 2021-06-14 NOTE — PROGRESS NOTE ADULT - PROBLEM SELECTOR PLAN 4
hemoblobin stable  iron stores low, RDW high  Transfuse for Hgb < 7  continue with iron supplementation

## 2021-06-14 NOTE — PROGRESS NOTE ADULT - SUBJECTIVE AND OBJECTIVE BOX
doing stable  no fever, pain or bleeding    MEDICATIONS  (STANDING):  carbidopa/levodopa/entacapone 12.5/50/200 1 Tablet(s) Oral three times a day  dextrose 5%. 1000 milliLiter(s) (60 mL/Hr) IV Continuous <Continuous>  enalapril 20 milliGRAM(s) Oral two times a day  insulin lispro (ADMELOG) corrective regimen sliding scale   SubCutaneous Before meals and at bedtime  oxybutynin XL 5 milliGRAM(s) Oral daily  pantoprazole    Tablet 40 milliGRAM(s) Oral before breakfast  rasagiline Tablet 1 milliGRAM(s) Oral daily  senna 2 Tablet(s) Oral at bedtime    MEDICATIONS  (PRN):  acetaminophen   Tablet .. 650 milliGRAM(s) Oral every 6 hours PRN Temp greater or equal to 38C (100.4F), Moderate Pain (4 - 6)  ALBUTerol    90 MICROgram(s) HFA Inhaler 2 Puff(s) Inhalation every 6 hours PRN Shortness of Breath and/or Wheezing  ondansetron Injectable 4 milliGRAM(s) IV Push every 6 hours PRN Nausea and/or Vomiting  polyethylene glycol 3350 17 Gram(s) Oral daily PRN Constipation      Allergies    No Known Allergies    Intolerances        Vital Signs Last 24 Hrs  T(C): 36.6 (14 Jun 2021 05:55), Max: 36.7 (13 Jun 2021 18:31)  T(F): 97.8 (14 Jun 2021 05:55), Max: 98.1 (13 Jun 2021 18:31)  HR: 74 (14 Jun 2021 05:55) (64 - 74)  BP: 132/57 (14 Jun 2021 05:55) (100/59 - 132/57)  BP(mean): --  RR: 18 (14 Jun 2021 05:55) (18 - 18)  SpO2: 100% (14 Jun 2021 05:55) (100% - 100%)    PHYSICAL EXAM  General: adult in NAD  HEENT: clear oropharynx, anicteric sclera, pink conjunctiva  Neck: supple  CV: normal S1/S2 with no murmur rubs or gallops  Lungs: positive air movement b/l ant lungs,clear to auscultation, no wheezes, no rales  Abdomen: soft non-tender non-distended, no hepatosplenomegaly  Ext: no clubbing cyanosis or edema  Skin: no rashes and no petechiae  Neuro: alert and oriented X 4, no focal deficits  LABS:                          8.5    4.03  )-----------( 204      ( 14 Jun 2021 08:20 )             25.8         Mean Cell Volume : 88.1 fl  Mean Cell Hemoglobin : 29.0 pg  Mean Cell Hemoglobin Concentration : 32.9 gm/dL  Auto Neutrophil # : x  Auto Lymphocyte # : x  Auto Monocyte # : x  Auto Eosinophil # : x  Auto Basophil # : x  Auto Neutrophil % : x  Auto Lymphocyte % : x  Auto Monocyte % : x  Auto Eosinophil % : x  Auto Basophil % : x    Serial CBC  Hematocrit 25.8  Hemoglobin 8.5  Plat 204  RBC 2.93  WBC 4.03  Serial CBC  Hematocrit 27.2  Hemoglobin 8.9  Plat 205  RBC 3.12  WBC 5.76  Serial CBC  Hematocrit 27.0  Hemoglobin 8.8  Plat 203  RBC 3.04  WBC 4.80  Serial CBC  Hematocrit 24.9  Hemoglobin 7.9  Plat 202  RBC 2.83  WBC 5.19    06-14    136  |  104  |  10  ----------------------------<  200<H>  3.9   |  25  |  0.92    Ca    8.5      14 Jun 2021 08:20  Phos  2.7     06-14  Mg     1.7     06-14                      BLOOD SMEAR INTERPRETATION:       RADIOLOGY & ADDITIONAL STUDIES:

## 2021-06-14 NOTE — PROGRESS NOTE ADULT - SUBJECTIVE AND OBJECTIVE BOX
[   ] ICU                                          [   ] CCU                                      [  X ] Medical Floor      Patient is a 75 year old with rectal bleeding. GI consulted to evaluate.      Patient is a 75 year old female from [place], with past medical history significant for Parkinson's, COPD, diverticulosis, HTN, DM and HLD, who presented to the ED due to rectal bleed. History is limited from patient due to her being a poor historian. As per ED provider note, patient was sent to the ED for concern for vaginal bleed. In ED, patient is noted to have rectal bleed instead of vaginal bleed. Patient is confused unable to provide any history. No abdominal pain, nausea, vomiting, hematemesis, melena, fever, chills, chest pain, SOB, cough, hematuria, dysuria or diarrhea reported.       Today patient appears comfortable with no new complaints. No abdominal pain, N/V, hematemesis, hematochezia, melena, fever, chills, chest pain, SOB, cough or diarrhea reported.    PAIN MANAGEMENT:  Pain Scale:                0/10  Pain Location:      Prior Colonoscopy: Unknown    PAST MEDICAL HISTORY  Parkinson disease  COPD  Diverticulosis  HTN (hypertension)  Diabetes mellitus  HLD (hyperlipidemia)      PAST SURGICAL HISTORY  No significant past surgical history      Allergies    No Known Allergies    Intolerances  None        SOCIAL HISTORY  Advanced Directives:       [ X ] Full Code       [  ] DNR  Marital Status:         [  ] M      [ X ] S      [  ] D       [  ] W  Children:       [ X ] Yes      [  ] No  Occupation:        [  ] Employed       [ X ] Unemployed       [  ] Retired  Diet:       [ X ] Regular       [  ] PEG feeding          [  ] NG tube feeding  Drug Use:           [ X ] Patient denied          [  ] Yes  Alcohol:           [ X ] No             [  ] Yes (socially)         [  ] Yes (chronic)  Tobacco:           [  ] Yes           [ X ] No    FAMILY HISTORY  [ X ] Heart Disease            [ X ] Diabetes             [ X ] HTN             [  ] Colon Cancer             [  ] Stomach Cancer              [  ] Pancreatic Cancer        VITALS  Vital Signs Last 24 Hrs  T(C): 36.6 (14 Jun 2021 05:55), Max: 36.7 (13 Jun 2021 18:31)  T(F): 97.8 (14 Jun 2021 05:55), Max: 98.1 (13 Jun 2021 18:31)  HR: 74 (14 Jun 2021 05:55) (64 - 74)  BP: 132/57 (14 Jun 2021 05:55) (100/59 - 132/57)   RR: 18 (14 Jun 2021 05:55) (18 - 18)  SpO2: 100% (14 Jun 2021 05:55) (100% - 100%)       MEDICATIONS  (STANDING):  carbidopa/levodopa/entacapone 12.5/50/200 1 Tablet(s) Oral three times a day  dextrose 5%. 1000 milliLiter(s) (60 mL/Hr) IV Continuous <Continuous>  enalapril 20 milliGRAM(s) Oral two times a day  insulin lispro (ADMELOG) corrective regimen sliding scale   SubCutaneous Before meals and at bedtime  oxybutynin XL 5 milliGRAM(s) Oral daily  pantoprazole    Tablet 40 milliGRAM(s) Oral before breakfast  rasagiline Tablet 1 milliGRAM(s) Oral daily  senna 2 Tablet(s) Oral at bedtime    MEDICATIONS  (PRN):  acetaminophen   Tablet .. 650 milliGRAM(s) Oral every 6 hours PRN Temp greater or equal to 38C (100.4F), Moderate Pain (4 - 6)  ALBUTerol    90 MICROgram(s) HFA Inhaler 2 Puff(s) Inhalation every 6 hours PRN Shortness of Breath and/or Wheezing  ondansetron Injectable 4 milliGRAM(s) IV Push every 6 hours PRN Nausea and/or Vomiting  polyethylene glycol 3350 17 Gram(s) Oral daily PRN Constipation                            8.5    4.03  )-----------( 204      ( 14 Jun 2021 08:20 )             25.8       06-14    136  |  104  |  10  ----------------------------<  200<H>  3.9   |  25  |  0.92    Ca    8.5      14 Jun 2021 08:20  Phos  2.7     06-14  Mg     1.7     06-14

## 2021-06-14 NOTE — PROGRESS NOTE ADULT - PROBLEM SELECTOR PLAN 1
6/10 PCR negative  Repeat PCR 6/15  Continue Isolation  no indication for steroids or remdesivir given 100% on room air  Case management to determine appropriate timing for return to facility given known exposure 6/10 PCR negative  Repeat PCR 6/15  Continue Isolation  no indication for steroids or remdesivir given 100% on room air  can come off isolation 6/21  Case management to determine appropriate timing for return to facility given known exposure

## 2021-06-14 NOTE — PROGRESS NOTE ADULT - SUBJECTIVE AND OBJECTIVE BOX
`NP Note discussed with  Primary Attending    Patient is a 75y old  Female who presents with a chief complaint of rectal bleed (14 Jun 2021 12:21)      INTERVAL HPI/OVERNIGHT EVENTS: no new complaints    MEDICATIONS  (STANDING):  ascorbic acid 500 milliGRAM(s) Oral daily  carbidopa/levodopa/entacapone 12.5/50/200 1 Tablet(s) Oral three times a day  dextrose 5%. 1000 milliLiter(s) (60 mL/Hr) IV Continuous <Continuous>  enalapril 20 milliGRAM(s) Oral two times a day  ferrous    sulfate 325 milliGRAM(s) Oral daily  insulin lispro (ADMELOG) corrective regimen sliding scale   SubCutaneous Before meals and at bedtime  oxybutynin XL 5 milliGRAM(s) Oral daily  pantoprazole    Tablet 40 milliGRAM(s) Oral before breakfast  rasagiline Tablet 1 milliGRAM(s) Oral daily  senna 2 Tablet(s) Oral at bedtime    MEDICATIONS  (PRN):  acetaminophen   Tablet .. 650 milliGRAM(s) Oral every 6 hours PRN Temp greater or equal to 38C (100.4F), Moderate Pain (4 - 6)  ALBUTerol    90 MICROgram(s) HFA Inhaler 2 Puff(s) Inhalation every 6 hours PRN Shortness of Breath and/or Wheezing  ondansetron Injectable 4 milliGRAM(s) IV Push every 6 hours PRN Nausea and/or Vomiting  polyethylene glycol 3350 17 Gram(s) Oral daily PRN Constipation      __________________________________________________  REVIEW OF SYSTEMS:    CONSTITUTIONAL: cachectic, chronically ill appearing female   EYES: no acute visual disturbances  NECK: No pain or stiffness  RESPIRATORY: No cough; No shortness of breath  CARDIOVASCULAR: No chest pain, no palpitations  GASTROINTESTINAL: No pain. No nausea or vomiting; No diarrhea   NEUROLOGICAL: No headache or numbness, no tremors  MUSCULOSKELETAL: No joint pain, no muscle pain  GENITOURINARY: no dysuria, no frequency, no hesitancy  PSYCHIATRY: no depression , no anxiety  ALL OTHER  ROS negative        Vital Signs Last 24 Hrs  T(C): 36.7 (14 Jun 2021 13:33), Max: 36.7 (13 Jun 2021 18:31)  T(F): 98.1 (14 Jun 2021 13:33), Max: 98.1 (13 Jun 2021 18:31)  HR: 70 (14 Jun 2021 13:33) (70 - 74)  BP: 136/69 (14 Jun 2021 13:33) (100/59 - 136/69)  BP(mean): --  RR: 16 (14 Jun 2021 13:33) (16 - 18)  SpO2: 100% (14 Jun 2021 13:33) (100% - 100%)    ________________________________________________  PHYSICAL EXAM:  GENERAL: NAD  HEENT: Normocephalic;  conjunctivae and sclerae clear; moist mucous membranes;   NECK : supple  CHEST/LUNG: Clear to auscultation bilaterally with good air entry   HEART: S1 S2  regular; no murmurs, gallops or rubs  ABDOMEN: Soft, Nontender, Nondistended; Bowel sounds present  EXTREMITIES: no cyanosis; no edema; no calf tenderness  SKIN: warm and dry; no rash  NERVOUS SYSTEM:  Awake and alert; Oriented  to place, person and time ; no new deficits    _________________________________________________  LABS:                        8.5    4.03  )-----------( 204      ( 14 Jun 2021 08:20 )             25.8     06-14    136  |  104  |  10  ----------------------------<  200<H>  3.9   |  25  |  0.92    Ca    8.5      14 Jun 2021 08:20  Phos  2.7     06-14  Mg     1.7     06-14          CAPILLARY BLOOD GLUCOSE      POCT Blood Glucose.: 159 mg/dL (14 Jun 2021 11:37)  POCT Blood Glucose.: 250 mg/dL (14 Jun 2021 07:55)  POCT Blood Glucose.: 189 mg/dL (13 Jun 2021 22:57)  POCT Blood Glucose.: 236 mg/dL (13 Jun 2021 21:06)  POCT Blood Glucose.: 151 mg/dL (13 Jun 2021 16:32)        RADIOLOGY & ADDITIONAL TESTS:    Imaging  Reviewed:  YES    Consultant(s) Notes Reviewed:   YES      Plan of care was discussed with patient and /or primary care giver; all questions and concerns were addressed

## 2021-06-14 NOTE — PROGRESS NOTE ADULT - ASSESSMENT
75 year old female from [place] with past medical history of Parkinson's Disease, COPD, diverticulosis, HTN, DM and HLD who presented to the ED for c/o rectal bleeding. CT A/P showed rectal wall thickening, colonic diverticulosis without diverticulitis, right adnexal mass, and DVT in LLE (left common femoral, femoral and deep femoral veins), and US duplex + for RLE DVT - patient refused to have the LLE examined. Patient is now s/p IVC filter placement for DVT given cannot tolerate AC's in the setting of GIB - had episode of bleeding while on heparin gtt. Colonoscopy with finding of rectal ulcer now s/p 2 clips. Heme/onc onboard for hypercoagulable workup and adnexal mass - ultrasound with uterine fibroid. Patient now pending placement, COVID-19 PCR pending.

## 2021-06-15 LAB
ANION GAP SERPL CALC-SCNC: 6 MMOL/L — SIGNIFICANT CHANGE UP (ref 5–17)
BUN SERPL-MCNC: 13 MG/DL — SIGNIFICANT CHANGE UP (ref 7–18)
CALCIUM SERPL-MCNC: 9.2 MG/DL — SIGNIFICANT CHANGE UP (ref 8.4–10.5)
CHLORIDE SERPL-SCNC: 105 MMOL/L — SIGNIFICANT CHANGE UP (ref 96–108)
CO2 SERPL-SCNC: 25 MMOL/L — SIGNIFICANT CHANGE UP (ref 22–31)
CREAT SERPL-MCNC: 0.68 MG/DL — SIGNIFICANT CHANGE UP (ref 0.5–1.3)
GLUCOSE BLDC GLUCOMTR-MCNC: 112 MG/DL — HIGH (ref 70–99)
GLUCOSE BLDC GLUCOMTR-MCNC: 119 MG/DL — HIGH (ref 70–99)
GLUCOSE BLDC GLUCOMTR-MCNC: 165 MG/DL — HIGH (ref 70–99)
GLUCOSE BLDC GLUCOMTR-MCNC: 171 MG/DL — HIGH (ref 70–99)
GLUCOSE SERPL-MCNC: 151 MG/DL — HIGH (ref 70–99)
HCT VFR BLD CALC: 27.3 % — LOW (ref 34.5–45)
HGB BLD-MCNC: 9 G/DL — LOW (ref 11.5–15.5)
MCHC RBC-ENTMCNC: 28.9 PG — SIGNIFICANT CHANGE UP (ref 27–34)
MCHC RBC-ENTMCNC: 33 GM/DL — SIGNIFICANT CHANGE UP (ref 32–36)
MCV RBC AUTO: 87.8 FL — SIGNIFICANT CHANGE UP (ref 80–100)
NRBC # BLD: 0 /100 WBCS — SIGNIFICANT CHANGE UP (ref 0–0)
PLATELET # BLD AUTO: 206 K/UL — SIGNIFICANT CHANGE UP (ref 150–400)
POTASSIUM SERPL-MCNC: 4.1 MMOL/L — SIGNIFICANT CHANGE UP (ref 3.5–5.3)
POTASSIUM SERPL-SCNC: 4.1 MMOL/L — SIGNIFICANT CHANGE UP (ref 3.5–5.3)
RBC # BLD: 3.11 M/UL — LOW (ref 3.8–5.2)
RBC # FLD: 16.8 % — HIGH (ref 10.3–14.5)
SODIUM SERPL-SCNC: 136 MMOL/L — SIGNIFICANT CHANGE UP (ref 135–145)
WBC # BLD: 5.25 K/UL — SIGNIFICANT CHANGE UP (ref 3.8–10.5)
WBC # FLD AUTO: 5.25 K/UL — SIGNIFICANT CHANGE UP (ref 3.8–10.5)

## 2021-06-15 RX ORDER — HEPARIN SODIUM 5000 [USP'U]/ML
5000 INJECTION INTRAVENOUS; SUBCUTANEOUS EVERY 8 HOURS
Refills: 0 | Status: DISCONTINUED | OUTPATIENT
Start: 2021-06-15 | End: 2021-06-21

## 2021-06-15 RX ADMIN — Medication 325 MILLIGRAM(S): at 12:11

## 2021-06-15 RX ADMIN — HEPARIN SODIUM 5000 UNIT(S): 5000 INJECTION INTRAVENOUS; SUBCUTANEOUS at 13:04

## 2021-06-15 RX ADMIN — PANTOPRAZOLE SODIUM 40 MILLIGRAM(S): 20 TABLET, DELAYED RELEASE ORAL at 05:04

## 2021-06-15 RX ADMIN — Medication 5 MILLIGRAM(S): at 12:11

## 2021-06-15 RX ADMIN — Medication 1: at 12:11

## 2021-06-15 RX ADMIN — CARBIDOPA, LEVODOPA, AND ENTACAPONE 1 TABLET(S): 50; 200; 200 TABLET, FILM COATED ORAL at 13:03

## 2021-06-15 RX ADMIN — HEPARIN SODIUM 5000 UNIT(S): 5000 INJECTION INTRAVENOUS; SUBCUTANEOUS at 22:39

## 2021-06-15 RX ADMIN — CARBIDOPA, LEVODOPA, AND ENTACAPONE 1 TABLET(S): 50; 200; 200 TABLET, FILM COATED ORAL at 22:38

## 2021-06-15 RX ADMIN — Medication 1: at 07:58

## 2021-06-15 RX ADMIN — RASAGILINE 1 MILLIGRAM(S): 0.5 TABLET ORAL at 12:11

## 2021-06-15 RX ADMIN — CARBIDOPA, LEVODOPA, AND ENTACAPONE 1 TABLET(S): 50; 200; 200 TABLET, FILM COATED ORAL at 05:04

## 2021-06-15 RX ADMIN — Medication 500 MILLIGRAM(S): at 12:11

## 2021-06-15 RX ADMIN — SENNA PLUS 2 TABLET(S): 8.6 TABLET ORAL at 22:39

## 2021-06-15 NOTE — PROGRESS NOTE ADULT - PROBLEM SELECTOR PLAN 1
- 6/14 PCR negative, Repeat PCR 6/17  - Continue Isolation per protocol, can come off isolation 6/21  - no indication for steroids or remdesivir given 100% on room air  - Case management to determine appropriate timing for return to facility given known exposure  - Pt is from [facility], SW will follow up if they will accept pt back as pt is PCR negative

## 2021-06-15 NOTE — PROGRESS NOTE ADULT - PROBLEM SELECTOR PLAN 4
- panchito Iron deficiency anemia  - iron stores low, RDW high  - Transfuse for Hgb < 7  - continue with iron supplementation

## 2021-06-15 NOTE — PROGRESS NOTE ADULT - TIME BILLING
PATIENT WITH [facility] FOR ACUTE HOSPITAL CARE   s/p IVC FILTER  H/H STABLE  PT OT   D/C BACK TO [facility]   CARE PLAN D/W  NP , CM AND PATIENT'S FAMILY

## 2021-06-15 NOTE — PROGRESS NOTE ADULT - SUBJECTIVE AND OBJECTIVE BOX
HPI:  75 YOF admitted with rectal bleeding.  CT revealed DVT    OVERNIGHT EVENTS:  No new overnight events     MEDICATIONS  (STANDING):  carbidopa/levodopa/entacapone 12.5/50/200 1 Tablet(s) Oral three times a day  dextrose 5%. 1000 milliLiter(s) (60 mL/Hr) IV Continuous <Continuous>  enalapril 20 milliGRAM(s) Oral two times a day  insulin lispro (ADMELOG) corrective regimen sliding scale   SubCutaneous Before meals and at bedtime  oxybutynin XL 5 milliGRAM(s) Oral daily  pantoprazole    Tablet 40 milliGRAM(s) Oral before breakfast  rasagiline Tablet 1 milliGRAM(s) Oral daily  senna 2 Tablet(s) Oral at bedtime    MEDICATIONS  (PRN):  acetaminophen   Tablet .. 650 milliGRAM(s) Oral every 6 hours PRN Temp greater or equal to 38C (100.4F), Moderate Pain (4 - 6)  ALBUTerol    90 MICROgram(s) HFA Inhaler 2 Puff(s) Inhalation every 6 hours PRN Shortness of Breath and/or Wheezing  ondansetron Injectable 4 milliGRAM(s) IV Push every 6 hours PRN Nausea and/or Vomiting  polyethylene glycol 3350 17 Gram(s) Oral daily PRN Constipation    REVIEW OF SYSTEMS:      CONSTITUTIONAL: No fever  EYES: no acute visual disturbances  NECK: No pain or stiffness  RESPIRATORY: No cough; No shortness of breath  CARDIOVASCULAR: No chest pain, no palpitations  GASTROINTESTINAL: No pain. No nausea, vomiting or diarrhea   NEUROLOGICAL: No headache or numbness, no tremors  MUSCULOSKELETAL: No joint pain, no muscle pain  GENITOURINARY: no dysuria, no frequency, no hesitancy  PSYCHIATRY: no depression, no anxiety  ALL OTHER  ROS negative      Vital Signs Last 24 Hrs  T(C): 36.7 (15 Maynor 2021 05:19), Max: 36.7 (2021 13:33)  T(F): 98 (15 Maynor 2021 05:19), Max: 98.1 (2021 13:33)  HR: 77 (15 Maynor 2021 05:19) (70 - 77)  BP: 95/77 (15 Maynor 2021 05:19) (95/77 - 136/69)  BP(mean): --  RR: 18 (15 Maynor 2021 05:19) (16 - 18)  SpO2: 100% (15 Maynor 2021 05:19) (100% - 100%)        PHYSICAL EXAM:    GENERAL: NAD  HEENT: Normocephalic; conjunctivae and sclerae clear;  NECK : supple, no JVD  CHEST/LUNG: Clear to auscultation  HEART: S1 S2  regular  ABDOMEN: Soft, Nontender, Nondistended; Bowel sounds present  EXTREMITIES: no cyanosis; no LE edema; no calf tenderness  SKIN: warm and dry  NERVOUS SYSTEM:  Alert; no new deficits but not oriented    _________________________________________________      LABS:                        9.0    5.25  )-----------( 206      ( 15 Maynor 2021 07:57 )             27.3     06-15    136  |  105  |  13  ----------------------------<  151<H>  4.1   |  25  |  0.68    Ca    9.2      15 Maynor 2021 07:57  Phos  2.7     06-14  Mg     1.7     06-14                            __________________________________________________  LABS:                        8.5    4.03  )-----------( 204      ( 2021 08:20 )             25.8     06-14    136  |  104  |  10  ----------------------------<  200<H>  3.9   |  25  |  0.92    Ca    8.5      2021 08:20  Phos  2.7     06-14  Mg     1.7     06-14                              LABS:                        8.9    5.76  )-----------( 205      ( 2021 06:08 )             27.2     06-13    138  |  108  |  7   ----------------------------<  175<H>  4.0   |  28  |  0.66    Ca    8.7      2021 06:08  Phos  2.9     06-13  Mg     1.7     06-13                            LABS:                        8.8    4.80  )-----------( 203      ( 2021 07:11 )             27.0     06-12    141  |  110<H>  |  6<L>  ----------------------------<  165<H>  3.9   |  27  |  0.69    Ca    8.6      2021 07:11  Phos  2.7     06-12  Mg     1.7     12                              LABS:                        7.9    5.19  )-----------( 202      ( 2021 07:38 )             24.9     06-11    135  |  106  |  7   ----------------------------<  235<H>  3.5   |  23  |  0.60    Ca    8.2<L>      2021 07:38  Phos  2.8     06-11  Mg     1.6     -11    TPro  5.1<L>  /  Alb  2.2<L>  /  TBili  0.5  /  DBili  x   /  AST  15  /  ALT  8<L>  /  AlkPhos  82  06-10    PT/INR - ( 10 Maynor 2021 05:36 )   PT: 12.3 sec;   INR: 1.04 ratio         PTT - ( 10 Maynor 2021 05:36 )  PTT:28.9 sec  Urinalysis Basic - ( 2021 17:19 )    Color: Yellow / Appearance: very cloudy / S.010 / pH: x  Gluc: x / Ketone: Negative  / Bili: Negative / Urobili: Negative   Blood: x / Protein: 100 / Nitrite: Negative   Leuk Esterase: Moderate / RBC: 10-25 /HPF / WBC >50 /HPF   Sq Epi: x / Non Sq Epi: Few /HPF / Bacteria: Many /HPF                        LABS:                          8.9    6.86  )-----------( 234      ( 10 Maynor 2021 05:36 )             28.1     06-10    140  |  109<H>  |  7   ----------------------------<  155<H>  4.1   |  27  |  0.73    Ca    8.8      10 Maynor 2021 05:36  Phos  3.2     06-09  Mg     1.9     06-09    TPro  5.1<L>  /  Alb  2.2<L>  /  TBili  0.5  /  DBili  x   /  AST  15  /  ALT  8<L>  /  AlkPhos  82  06-10    PT/INR - ( 10 Maynor 2021 05:36 )   PT: 12.3 sec;   INR: 1.04 ratio         PTT - ( 10 Maynor 2021 05:36 )  PTT:28.9 sec  Urinalysis Basic - ( 2021 17:19 )    Color: Yellow / Appearance: very cloudy / S.010 / pH: x  Gluc: x / Ketone: Negative  / Bili: Negative / Urobili: Negative   Blood: x / Protein: 100 / Nitrite: Negative   Leuk Esterase: Moderate / RBC: 10-25 /HPF / WBC >50 /HPF   Sq Epi: x / Non Sq Epi: Few /HPF / Bacteria: Many /HPF      CAPILLARY BLOOD GLUCOSE      POCT Blood Glucose.: 153 mg/dL (10 Maynor 2021 11:50)  POCT Blood Glucose.: 196 mg/dL (10 Maynor 2021 08:07)  POCT Blood Glucose.: 192 mg/dL (2021 20:58)  POCT Blood Glucose.: 134 mg/dL (2021 17:08)      __________________________________________________  RADIOLOGY & ADDITIONAL TESTS:    Imaging Personally Reviewed:  YES    < from: CT Abdomen and Pelvis w/ IV Cont (21 @ 10:11) >  LIVER: Multiple subcentimeter hepatic hypodensities, too small to further characterize.  BILE DUCTS: Normal caliber.  GALLBLADDER: Cholelithiasis.  SPLEEN: Within normal limits.  PANCREAS: Mildly atrophic.  ADRENALS: Within normal limits.  KIDNEYS/URETERS: Subcentimeter renal hypodensities, too small to further characterize. No hydronephrosis.    BLADDER: Within normal limits.  REPRODUCTIVE ORGANS: Uterus and left adnexa within normal limits. 3.7 x 2.8 cm right adnexal mass.    BOWEL: No bowel obstruction. Appendix is normal. Colonic diverticulosis. Rectal wall thickening.  PERITONEUM: Trace pelvic fluid.  VESSELS: Atherosclerotic changes. Filling defects noted within the left common femoral, femoral, and deep femoral veins.  RETROPERITONEUM/LYMPH NODES: No lymphadenopathy.  ABDOMINAL WALL: Small fat-containing left inguinal hernia.  BONES: Degenerative changes. Severe compression deformity of L2. Fixation cement noted within the L2 vertebral body. Mild wedge deformity of T11.    IMPRESSION:    1. Rectal wall thickening, question proctitis.  2. Colonic diverticulosis without CT evidence of acute diverticulitis. No bowel obstruction.  3. Right adnexal mass (3.7 x 2.8 cm). Recommend pelvic ultrasound for further assessment.  4. Deep venous thrombosis within the imaged left lower extremity (left common femoral, femoral, and deep femoral veins).    < end of copied text >    < from: US Pelvis Complete (US Pelvis Complete .) (21 @ 15:51) >  Uterus: 6.3 cm x 3.5 cm x 4.4 cm. 3.1 x 3.6 x 3.4 cm subserosal exophytic right uterine fibroid.  Endometrium: 4 mm. Within normal limits.    The ovaries are not visualized due to abundant bowel gas in the adnexa.    Sheppard catheter in the urinary bladder. Dependent debris in the urinary bladder.    Fluid: No pelvic free fluid.    IMPRESSION:  3.6 cm right uterine fibroid.    < end of copied text >    Consultant(s) Notes Reviewed:   YES     Plan of care was discussed with patient and /or primary care giver; all questions and concerns were addressed and care was aligned with patient's wishes.    Plan discussed with attending and consulting physicians.

## 2021-06-15 NOTE — PROGRESS NOTE ADULT - PROBLEM SELECTOR PLAN 4
CT  noted above  US noted above  Cancer marker 125: 10, GI Ca marker 19-9: <2  Dr. Yan, Heme/Onc, following  given patient's age and poor general conditions , no further work

## 2021-06-15 NOTE — PROGRESS NOTE ADULT - SUBJECTIVE AND OBJECTIVE BOX
[   ] ICU                                          [   ] CCU                                      [  X ] Medical Floor      Patient is a 75 year old with rectal bleeding. GI consulted to evaluate.      Patient is a 75 year old female from [place], with past medical history significant for Parkinson's, COPD, diverticulosis, HTN, DM and HLD, who presented to the ED due to rectal bleed. History is limited from patient due to her being a poor historian. As per ED provider note, patient was sent to the ED for concern for vaginal bleed. In ED, patient is noted to have rectal bleed instead of vaginal bleed. Patient is confused unable to provide any history. No abdominal pain, nausea, vomiting, hematemesis, melena, fever, chills, chest pain, SOB, cough, hematuria, dysuria or diarrhea reported.       Today patient appears comfortable with no new complaints. No abdominal pain, N/V, hematemesis, hematochezia, melena, fever, chills, chest pain, SOB, cough or diarrhea reported.    PAIN MANAGEMENT:  Pain Scale:                0/10  Pain Location:      Prior Colonoscopy: Unknown    PAST MEDICAL HISTORY  Parkinson disease  COPD  Diverticulosis  HTN (hypertension)  Diabetes mellitus  HLD (hyperlipidemia)      PAST SURGICAL HISTORY  No significant past surgical history      Allergies    No Known Allergies    Intolerances  None        SOCIAL HISTORY  Advanced Directives:       [ X ] Full Code       [  ] DNR  Marital Status:         [  ] M      [ X ] S      [  ] D       [  ] W  Children:       [ X ] Yes      [  ] No  Occupation:        [  ] Employed       [ X ] Unemployed       [  ] Retired  Diet:       [ X ] Regular       [  ] PEG feeding          [  ] NG tube feeding  Drug Use:           [ X ] Patient denied          [  ] Yes  Alcohol:           [ X ] No             [  ] Yes (socially)         [  ] Yes (chronic)  Tobacco:           [  ] Yes           [ X ] No    FAMILY HISTORY  [ X ] Heart Disease            [ X ] Diabetes             [ X ] HTN             [  ] Colon Cancer             [  ] Stomach Cancer              [  ] Pancreatic Cancer      VITALS  Vital Signs Last 24 Hrs  T(C): 37.4 (15 Maynor 2021 13:26), Max: 37.4 (15 Maynor 2021 13:26)  T(F): 99.4 (15 Maynor 2021 13:26), Max: 99.4 (15 Maynor 2021 13:26)  HR: 80 (15 Maynor 2021 13:33) (70 - 80)  BP: 90/54 (15 Maynor 2021 13:33) (90/54 - 109/58)   RR: 18 (15 Maynor 2021 13:26) (16 - 18)  SpO2: 98% (15 Maynor 2021 13:26) (98% - 100%)       MEDICATIONS  (STANDING):  ascorbic acid 500 milliGRAM(s) Oral daily  carbidopa/levodopa/entacapone 12.5/50/200 1 Tablet(s) Oral three times a day  enalapril 20 milliGRAM(s) Oral two times a day  ferrous    sulfate 325 milliGRAM(s) Oral daily  heparin   Injectable 5000 Unit(s) SubCutaneous every 8 hours  insulin lispro (ADMELOG) corrective regimen sliding scale   SubCutaneous Before meals and at bedtime  oxybutynin XL 5 milliGRAM(s) Oral daily  pantoprazole    Tablet 40 milliGRAM(s) Oral before breakfast  rasagiline Tablet 1 milliGRAM(s) Oral daily  senna 2 Tablet(s) Oral at bedtime    MEDICATIONS  (PRN):  acetaminophen   Tablet .. 650 milliGRAM(s) Oral every 6 hours PRN Temp greater or equal to 38C (100.4F), Moderate Pain (4 - 6)  ALBUTerol    90 MICROgram(s) HFA Inhaler 2 Puff(s) Inhalation every 6 hours PRN Shortness of Breath and/or Wheezing  ondansetron Injectable 4 milliGRAM(s) IV Push every 6 hours PRN Nausea and/or Vomiting  polyethylene glycol 3350 17 Gram(s) Oral daily PRN Constipation                            9.0    5.25  )-----------( 206      ( 15 Maynor 2021 07:57 )             27.3       06-15    136  |  105  |  13  ----------------------------<  151<H>  4.1   |  25  |  0.68    Ca    9.2      15 Maynor 2021 07:57  Phos  2.7     06-14  Mg     1.7     06-14

## 2021-06-15 NOTE — PROGRESS NOTE ADULT - SUBJECTIVE AND OBJECTIVE BOX
condition stable  no bleeding no sob      MEDICATIONS  (STANDING):  ascorbic acid 500 milliGRAM(s) Oral daily  carbidopa/levodopa/entacapone 12.5/50/200 1 Tablet(s) Oral three times a day  enalapril 20 milliGRAM(s) Oral two times a day  ferrous    sulfate 325 milliGRAM(s) Oral daily  insulin lispro (ADMELOG) corrective regimen sliding scale   SubCutaneous Before meals and at bedtime  oxybutynin XL 5 milliGRAM(s) Oral daily  pantoprazole    Tablet 40 milliGRAM(s) Oral before breakfast  rasagiline Tablet 1 milliGRAM(s) Oral daily  senna 2 Tablet(s) Oral at bedtime    MEDICATIONS  (PRN):  acetaminophen   Tablet .. 650 milliGRAM(s) Oral every 6 hours PRN Temp greater or equal to 38C (100.4F), Moderate Pain (4 - 6)  ALBUTerol    90 MICROgram(s) HFA Inhaler 2 Puff(s) Inhalation every 6 hours PRN Shortness of Breath and/or Wheezing  ondansetron Injectable 4 milliGRAM(s) IV Push every 6 hours PRN Nausea and/or Vomiting  polyethylene glycol 3350 17 Gram(s) Oral daily PRN Constipation      Allergies    No Known Allergies    Intolerances        Vital Signs Last 24 Hrs  T(C): 36.7 (15 Maynor 2021 05:19), Max: 36.7 (14 Jun 2021 13:33)  T(F): 98 (15 Maynor 2021 05:19), Max: 98.1 (14 Jun 2021 13:33)  HR: 77 (15 Maynor 2021 05:19) (70 - 77)  BP: 95/77 (15 Maynor 2021 05:19) (95/77 - 136/69)  BP(mean): --  RR: 18 (15 Maynor 2021 05:19) (16 - 18)  SpO2: 100% (15 Maynor 2021 05:19) (100% - 100%)    PHYSICAL EXAM  General: adult in NAD  HEENT: clear oropharynx, anicteric sclera, pink conjunctiva  Neck: supple  CV: normal S1/S2 with no murmur rubs or gallops  Lungs: positive air movement b/l ant lungs,clear to auscultation, no wheezes, no rales  Abdomen: soft non-tender non-distended, no hepatosplenomegaly  Ext: no clubbing cyanosis or edema  Skin: no rashes and no petechiae  Neuro: alert and oriented X 4, no focal deficits  LABS:                          9.0    5.25  )-----------( 206      ( 15 Maynor 2021 07:57 )             27.3         Mean Cell Volume : 87.8 fl  Mean Cell Hemoglobin : 28.9 pg  Mean Cell Hemoglobin Concentration : 33.0 gm/dL  Auto Neutrophil # : x  Auto Lymphocyte # : x  Auto Monocyte # : x  Auto Eosinophil # : x  Auto Basophil # : x  Auto Neutrophil % : x  Auto Lymphocyte % : x  Auto Monocyte % : x  Auto Eosinophil % : x  Auto Basophil % : x    Serial CBC  Hematocrit 27.3  Hemoglobin 9.0  Plat 206  RBC 3.11  WBC 5.25  Serial CBC  Hematocrit 25.8  Hemoglobin 8.5  Plat 204  RBC 2.93  WBC 4.03  Serial CBC  Hematocrit 27.2  Hemoglobin 8.9  Plat 205  RBC 3.12  WBC 5.76  Serial CBC  Hematocrit 27.0  Hemoglobin 8.8  Plat 203  RBC 3.04  WBC 4.80    06-15    136  |  105  |  13  ----------------------------<  151<H>  4.1   |  25  |  0.68    Ca    9.2      15 Maynor 2021 07:57  Phos  2.7     06-14  Mg     1.7     06-14                      BLOOD SMEAR INTERPRETATION:       RADIOLOGY & ADDITIONAL STUDIES:

## 2021-06-15 NOTE — PROGRESS NOTE ADULT - SUBJECTIVE AND OBJECTIVE BOX
Patient is a 75y old  Female who presents with a chief complaint of rectal bleed (15 Maynor 2021 09:52)    INTERVAL HPI/OVERNIGHT EVENTS: no new complaints, pleasantly confused, singing song in bed      REVIEW OF SYSTEMS: unable to assess due to pt's mental status    T(C): 36.7 (06-15-21 @ 05:19), Max: 36.7 (06-14-21 @ 13:33)  HR: 77 (06-15-21 @ 05:19) (70 - 77)  BP: 95/77 (06-15-21 @ 05:19) (95/77 - 136/69)  RR: 18 (06-15-21 @ 05:19) (16 - 18)  SpO2: 100% (06-15-21 @ 05:19) (100% - 100%)  Wt(kg): --Vital Signs Last 24 Hrs  T(C): 36.7 (15 Maynor 2021 05:19), Max: 36.7 (14 Jun 2021 13:33)  T(F): 98 (15 Maynor 2021 05:19), Max: 98.1 (14 Jun 2021 13:33)  HR: 77 (15 Maynor 2021 05:19) (70 - 77)  BP: 95/77 (15 Maynor 2021 05:19) (95/77 - 136/69)  BP(mean): --  RR: 18 (15 Maynor 2021 05:19) (16 - 18)  SpO2: 100% (15 Maynor 2021 05:19) (100% - 100%)    MEDICATIONS  (STANDING):  ascorbic acid 500 milliGRAM(s) Oral daily  carbidopa/levodopa/entacapone 12.5/50/200 1 Tablet(s) Oral three times a day  enalapril 20 milliGRAM(s) Oral two times a day  ferrous    sulfate 325 milliGRAM(s) Oral daily  heparin   Injectable 5000 Unit(s) SubCutaneous every 8 hours  insulin lispro (ADMELOG) corrective regimen sliding scale   SubCutaneous Before meals and at bedtime  oxybutynin XL 5 milliGRAM(s) Oral daily  pantoprazole    Tablet 40 milliGRAM(s) Oral before breakfast  rasagiline Tablet 1 milliGRAM(s) Oral daily  senna 2 Tablet(s) Oral at bedtime    MEDICATIONS  (PRN):  acetaminophen   Tablet .. 650 milliGRAM(s) Oral every 6 hours PRN Temp greater or equal to 38C (100.4F), Moderate Pain (4 - 6)  ALBUTerol    90 MICROgram(s) HFA Inhaler 2 Puff(s) Inhalation every 6 hours PRN Shortness of Breath and/or Wheezing  ondansetron Injectable 4 milliGRAM(s) IV Push every 6 hours PRN Nausea and/or Vomiting  polyethylene glycol 3350 17 Gram(s) Oral daily PRN Constipation    PHYSICAL EXAM:  GENERAL: NAD, pleasantly confused   EYES: clear conjunctiva; EOMI  ENMT: Moist mucous membranes  NECK: Supple, No JVD, Normal thyroid  CHEST/LUNG: Clear to auscultation bilaterally; No rales, rhonchi, wheezing, or rubs  HEART: S1, S2, Regular rate and rhythm  ABDOMEN: Soft, Nontender, Nondistended; Bowel sounds present  NEURO: Alert & Oriented X3  EXTREMITIES: No LE edema, no calf tenderness, contracted  LYMPH: No lymphadenopathy noted  SKIN: No rashes or lesions    Consultant(s) Notes Reviewed:  [x ] YES  [ ] NO  Care Discussed with Consultants/Other Providers [ x] YES  [ ] NO    LABS:                        9.0    5.25  )-----------( 206      ( 15 Maynor 2021 07:57 )             27.3     06-15    136  |  105  |  13  ----------------------------<  151<H>  4.1   |  25  |  0.68    Ca    9.2      15 Maynor 2021 07:57  Phos  2.7     06-14  Mg     1.7     06-14    CAPILLARY BLOOD GLUCOSE  POCT Blood Glucose.: 171 mg/dL (15 Maynor 2021 11:43)  POCT Blood Glucose.: 165 mg/dL (15 Maynor 2021 07:55)  POCT Blood Glucose.: 178 mg/dL (14 Jun 2021 21:07)  POCT Blood Glucose.: 186 mg/dL (14 Jun 2021 17:11)      RADIOLOGY & ADDITIONAL TESTS:    Imaging Personally Reviewed:  [x ] YES  [ ] NO  < from: IR Fluoroscopy <1 Hour (06.10.21 @ 11:36) >  EXAM:  IR PROCEDURE                          EXAM:  SP INS IVC FILTER SISC                          EXAM:  SP US GUID VASC ACCESS                          EXAM:  SP FLUOR GUID CV CATH PROC SI                          EXAM:  SP FLUOROSCOPY TO 1 HOUR                           PROCEDURE DATE:  06/10/2021          INTERPRETATION:  PROCEDURE: IVC FILTER PLACEMENT. IVC VENOGRAM    : JOANN Edwards MD, YULY Stone    IV CONTRAST: 35 mL Omnipaque-350 was used.    CLINICAL INDICATION: 75-year-old female with multiple comorbidities and bilateral lower extremity DVT, not a candidate for anticoagulation due to GI bleeding. Inferior vena cava filter placement was requested.    ANESTHESIA: A total of 8 mL of 1% lidocaine was used for local anesthesia.    TECHNIQUE: After discussing the risks, benefits and alternatives to the procedure, informed consent was obtained. A time out was performed prior to the procedure.    The patient was placed in supine position and connected to physiologic monitoring. Ultrasound examination of the right upper arm was performed, which demonstrated a patent and compressible brachial vein. Ultrasound images were captured and saved in PACS. An attempt was made to place the filter via the right brachial vein, however after inability to advance the mandril wire centrally, a venogram was performed via the introducer catheter demonstrating stenosis and thrombosis of the right axillary vein. The catheter was removed and hemostasis was achieved with manual compression. Access via the right jugular vein was not attempted due to contracted state of the patient.    Ultrasound examination of the right groin was performed, which demonstrated a patent and compressible common femoral vein. Ultrasound images were captured and saved in PACS. The right groin was prepped and draped in the usual sterile fashion. Under ultrasound guidance, access to the right common femoral vein was obtained with a micropuncture kit. The wire and inner dilator were removed and a venogram was performed, revealing a widely patent inferior vena cava. The renal vein inflows were identified. The catheter was then exchanged for an introducer catheter over a 0.035 inch guidewire. A Vena Tech IVC filter was then deployed within the infrarenal inferior vena cava with its apex positioned at the level of the superior endplate of the L2 vertebral body. The introducer catheter was removed and hemostasis was achieved by manual compression over a hemostatic patch. A dry sterile dressing was applied.    The patient tolerated the procedure and was transferred from the radiology department in stable condition.    IMPRESSION:  SUCCESSFUL INSERTION OF VENA TECH IVC FILTER INTO THE INFRARENAL INFERIOR VENA CAVA AS DESCRIBED ABOVE.            EUGENIO EDWARDS MD; Attending Interventional Radiologist  This document has been electronically signed. Maynor 10 2021  1:16PM    < end of copied text >

## 2021-06-15 NOTE — PROGRESS NOTE ADULT - ASSESSMENT
75 year old female from [place] with past medical history of Parkinson's Disease, COPD, diverticulosis, HTN, DM and HLD who presented to the ED for c/o rectal bleeding. CT A/P showed rectal wall thickening, colonic diverticulosis without diverticulitis, right adnexal mass, and DVT in LLE (left common femoral, femoral and deep femoral veins), and US duplex + for RLE DVT - patient refused to have the LLE examined. Patient is now s/p IVC filter placement for DVT given cannot tolerate AC's in the setting of GIB - had episode of bleeding while on heparin gtt. Colonoscopy with finding of rectal ulcer now s/p 2 clips. Heme/onc onboard for hypercoagulable workup and adnexal mass - ultrasound with uterine fibroid. Patient now pending placement, COVID-19 PCR negative since 6/14.

## 2021-06-15 NOTE — PROGRESS NOTE ADULT - ASSESSMENT
· Assessment	  75 year old lady presented with rectal bleeding.  CT showed rectal thickening, adnexal mass, and DVT.    1. rectal bleeding. will do sigmoidoscopy  please give her enema  discussed with Dr. Leonor herr prep  large hemorrhoid, and rectl ulcer  cauterized  no bleeding in last 2 days    2. adnexal mass  will do transvaginal ultrasound  check ca 125  US showed fibroids    3. DVT  not on AC yet due to rectal bleeding  for sigmoidoscopy  she has rectal bleeding again while on heparin  IVC filter placed  there is no more bleeding  but DVT still needs to be treatred  plan for lovenox 40

## 2021-06-16 LAB
GLUCOSE BLDC GLUCOMTR-MCNC: 116 MG/DL — HIGH (ref 70–99)
GLUCOSE BLDC GLUCOMTR-MCNC: 117 MG/DL — HIGH (ref 70–99)
GLUCOSE BLDC GLUCOMTR-MCNC: 125 MG/DL — HIGH (ref 70–99)
GLUCOSE BLDC GLUCOMTR-MCNC: 125 MG/DL — HIGH (ref 70–99)

## 2021-06-16 RX ADMIN — HEPARIN SODIUM 5000 UNIT(S): 5000 INJECTION INTRAVENOUS; SUBCUTANEOUS at 22:06

## 2021-06-16 RX ADMIN — Medication 20 MILLIGRAM(S): at 17:40

## 2021-06-16 RX ADMIN — RASAGILINE 1 MILLIGRAM(S): 0.5 TABLET ORAL at 11:38

## 2021-06-16 RX ADMIN — PANTOPRAZOLE SODIUM 40 MILLIGRAM(S): 20 TABLET, DELAYED RELEASE ORAL at 06:31

## 2021-06-16 RX ADMIN — SENNA PLUS 2 TABLET(S): 8.6 TABLET ORAL at 22:06

## 2021-06-16 RX ADMIN — CARBIDOPA, LEVODOPA, AND ENTACAPONE 1 TABLET(S): 50; 200; 200 TABLET, FILM COATED ORAL at 22:06

## 2021-06-16 RX ADMIN — Medication 325 MILLIGRAM(S): at 11:38

## 2021-06-16 RX ADMIN — CARBIDOPA, LEVODOPA, AND ENTACAPONE 1 TABLET(S): 50; 200; 200 TABLET, FILM COATED ORAL at 06:32

## 2021-06-16 RX ADMIN — CARBIDOPA, LEVODOPA, AND ENTACAPONE 1 TABLET(S): 50; 200; 200 TABLET, FILM COATED ORAL at 13:24

## 2021-06-16 RX ADMIN — HEPARIN SODIUM 5000 UNIT(S): 5000 INJECTION INTRAVENOUS; SUBCUTANEOUS at 06:31

## 2021-06-16 RX ADMIN — Medication 5 MILLIGRAM(S): at 11:38

## 2021-06-16 RX ADMIN — Medication 500 MILLIGRAM(S): at 11:38

## 2021-06-16 RX ADMIN — HEPARIN SODIUM 5000 UNIT(S): 5000 INJECTION INTRAVENOUS; SUBCUTANEOUS at 13:24

## 2021-06-16 RX ADMIN — Medication 20 MILLIGRAM(S): at 06:31

## 2021-06-16 NOTE — PROGRESS NOTE ADULT - ASSESSMENT
· Assessment	  75 year old lady presented with rectal bleeding.  CT showed rectal thickening, adnexal mass, and DVT.    1. rectal bleeding. will do sigmoidoscopy  please give her enema  discussed with Dr. Leonor herr prep  large hemorrhoid, and rectl ulcer  cauterized  no bleeding in last 2 days    2. adnexal mass  will do transvaginal ultrasound  check ca 125  US showed fibroids    3. DVT  not on AC yet due to rectal bleeding  for sigmoidoscopy  she has rectal bleeding again while on heparin  IVC filter placed  there is no more bleeding  but DVT still needs to be treatred  on sq heparin 5000 q8h  plan for lovenox 40

## 2021-06-16 NOTE — PROGRESS NOTE ADULT - PROBLEM SELECTOR PLAN 1
- 6/14 PCR negative, Repeat PCR 6/17  - Continue Isolation per protocol, can come off isolation 6/21  - no indication for steroids or remdesivir given 100% on room air  - Pt can D/c back to [facility] on 6/21  - CM/SW following  - updated daughter [place] regarding pt/s condition and plan of care

## 2021-06-16 NOTE — PROGRESS NOTE ADULT - ASSESSMENT
75 year old female from [place] with past medical history of Parkinson's Disease, COPD, diverticulosis, HTN, DM and HLD who presented to the ED for c/o rectal bleeding. CT A/P showed rectal wall thickening, colonic diverticulosis without diverticulitis, right adnexal mass, and DVT in LLE (left common femoral, femoral and deep femoral veins), and US duplex + for RLE DVT - patient refused to have the LLE examined. Patient is now s/p IVC filter placement for DVT given cannot tolerate AC's in the setting of GIB - had episode of bleeding while on heparin gtt. Colonoscopy with finding of rectal ulcer now s/p 2 clips. no active bleeding noted in colonoscopy.  Heme/onc onboard for hypercoagulable workup and adnexal mass - ultrasound with uterine fibroid. Patient now pending placement, COVID-19 PCR negative since 6/14. Pt can D/c on 6/21 back to NH

## 2021-06-16 NOTE — PROGRESS NOTE ADULT - TIME BILLING
patient with above dxs   patient had exposure to known covid patient on isoltion   no s/s of active disease   d/c planning as per hospital protocol  care plan d/w np and patient's family.

## 2021-06-16 NOTE — PROGRESS NOTE ADULT - SUBJECTIVE AND OBJECTIVE BOX
Patient is a 75y old  Female who presents with a chief complaint of rectal bleed (15 Maynor 2021 16:22)    INTERVAL HPI/OVERNIGHT EVENTS: no new complaints, more talkative and alert today, pleasantly confused.    REVIEW OF SYSTEMS: unable to obtain due to pt'smental status    T(C): 36.6 (06-16-21 @ 05:38), Max: 37.4 (06-15-21 @ 13:26)  HR: 64 (06-16-21 @ 05:38) (64 - 81)  BP: 106/48 (06-16-21 @ 05:38) (90/54 - 111/51)  RR: 15 (06-16-21 @ 05:38) (15 - 18)  SpO2: 95% (06-16-21 @ 05:38) (95% - 99%)  Wt(kg): --Vital Signs Last 24 Hrs  T(C): 36.6 (16 Jun 2021 05:38), Max: 37.4 (15 Maynor 2021 13:26)  T(F): 97.9 (16 Jun 2021 05:38), Max: 99.4 (15 Mayonr 2021 13:26)  HR: 64 (16 Jun 2021 05:38) (64 - 81)  BP: 106/48 (16 Jun 2021 05:38) (90/54 - 111/51)  BP(mean): --  RR: 15 (16 Jun 2021 05:38) (15 - 18)  SpO2: 95% (16 Jun 2021 05:38) (95% - 99%)    MEDICATIONS  (STANDING):  ascorbic acid 500 milliGRAM(s) Oral daily  carbidopa/levodopa/entacapone 12.5/50/200 1 Tablet(s) Oral three times a day  enalapril 20 milliGRAM(s) Oral two times a day  ferrous    sulfate 325 milliGRAM(s) Oral daily  heparin   Injectable 5000 Unit(s) SubCutaneous every 8 hours  insulin lispro (ADMELOG) corrective regimen sliding scale   SubCutaneous Before meals and at bedtime  oxybutynin XL 5 milliGRAM(s) Oral daily  pantoprazole    Tablet 40 milliGRAM(s) Oral before breakfast  rasagiline Tablet 1 milliGRAM(s) Oral daily  senna 2 Tablet(s) Oral at bedtime    MEDICATIONS  (PRN):  acetaminophen   Tablet .. 650 milliGRAM(s) Oral every 6 hours PRN Temp greater or equal to 38C (100.4F), Moderate Pain (4 - 6)  ALBUTerol    90 MICROgram(s) HFA Inhaler 2 Puff(s) Inhalation every 6 hours PRN Shortness of Breath and/or Wheezing  ondansetron Injectable 4 milliGRAM(s) IV Push every 6 hours PRN Nausea and/or Vomiting  polyethylene glycol 3350 17 Gram(s) Oral daily PRN Constipation      PHYSICAL EXAM:  GENERAL: NAD  EYES: clear conjunctiva; EOMI  ENMT: Moist mucous membranes  NECK: Supple, No JVD, Normal thyroid  CHEST/LUNG: Clear to auscultation bilaterally; No rales, rhonchi, wheezing, or rubs  HEART: S1, S2, Regular rate and rhythm  ABDOMEN: Soft, Nontender, Nondistended; Bowel sounds present  NEURO: Alert & Oriented X1  EXTREMITIES: No LE edema, no calf tenderness  LYMPH: No lymphadenopathy noted  SKIN: No rashes or lesions    Consultant(s) Notes Reviewed:  [x ] YES  [ ] NO  Care Discussed with Consultants/Other Providers [ x] YES  [ ] NO    LABS:                        9.0    5.25  )-----------( 206      ( 15 Maynor 2021 07:57 )             27.3     06-15    136  |  105  |  13  ----------------------------<  151<H>  4.1   |  25  |  0.68    Ca    9.2      15 Maynor 2021 07:57    CAPILLARY BLOOD GLUCOSE    POCT Blood Glucose.: 116 mg/dL (16 Jun 2021 07:47)  POCT Blood Glucose.: 119 mg/dL (15 Maynor 2021 21:48)  POCT Blood Glucose.: 112 mg/dL (15 Maynor 2021 17:07)  POCT Blood Glucose.: 171 mg/dL (15 Maynor 2021 11:43)      RADIOLOGY & ADDITIONAL TESTS:    Imaging Personally Reviewed:  [ x] YES  [ ] NO  < from: IR Fluoroscopy <1 Hour (06.10.21 @ 11:36) >    EXAM:  IR PROCEDURE                          EXAM:  SP INS IVC FILTER SISC                          EXAM:  SP US GUID VASC ACCESS                          EXAM:  SP FLUOR GUID CV CATH PROC SI                          EXAM:  SP FLUOROSCOPY TO 1 HOUR                           PROCEDURE DATE:  06/10/2021          INTERPRETATION:  PROCEDURE: IVC FILTER PLACEMENT. IVC VENOGRAM    : JOANN Edwards MD, YULY Stone    IV CONTRAST: 35 mL Omnipaque-350 was used.    CLINICAL INDICATION: 75-year-old female with multiple comorbidities and bilateral lower extremity DVT, not a candidate for anticoagulation due to GI bleeding. Inferior vena cava filter placement was requested.    ANESTHESIA: A total of 8 mL of 1% lidocaine was used for local anesthesia.    TECHNIQUE: After discussing the risks, benefits and alternatives to the procedure, informed consent was obtained. A time out was performed prior to the procedure.    The patient was placed in supine position and connected to physiologic monitoring. Ultrasound examination of the right upper arm was performed, which demonstrated a patent and compressible brachial vein. Ultrasound images were captured and saved in PACS. An attempt was made to place the filter via the right brachial vein, however after inability to advance the mandril wire centrally, a venogram was performed via the introducer catheter demonstrating stenosis and thrombosis of the right axillary vein. The catheter was removed and hemostasis was achieved with manual compression. Access via the right jugular vein was not attempted due to contracted state of the patient.    Ultrasound examination of the right groin was performed, which demonstrated a patent and compressible common femoral vein. Ultrasound images were captured and saved in PACS. The right groin was prepped and draped in the usual sterile fashion. Under ultrasound guidance, access to the right common femoral vein was obtained with a micropuncture kit. The wire and inner dilator were removed and a venogram was performed, revealing a widely patent inferior vena cava. The renal vein inflows were identified. The catheter was then exchanged for an introducer catheter over a 0.035 inch guidewire. A Vena Tech IVC filter was then deployed within the infrarenal inferior vena cava with its apex positioned at the level of the superior endplate of the L2 vertebral body. The introducer catheter was removed and hemostasis was achieved by manual compression over a hemostatic patch. A dry sterile dressing was applied.    The patient tolerated the procedure and was transferred from the radiology department in stable condition.    IMPRESSION:  SUCCESSFUL INSERTION OF VENA TECH IVC FILTER INTO THE INFRARENAL INFERIOR VENA CAVA AS DESCRIBED ABOVE.            EUGENIO EDWARDS MD; Attending Interventional Radiologist  This document has been electronically signed. Maynor 10 2021  1:16PM    < end of copied text >  Assessment and Plan:   · Assessment	  1. Rectal bleeding stopped  2. Anemia  3. S/p Colonoscopy  4. Rectal ulcer  5. Diverticulosis  6. Large hemorrhoids  7. No evidence of acute GI bleeding at present time    Suggestions:    1. Monitor H/H  2. Transfuse PRBC as needed  3. Avoid NSAID  4. Protonix 40mg daily  5. DVT prophylaxis        Electronic Signatures:  Mike Galvez)  (Signed 15-Maynor-2021 16:30)

## 2021-06-16 NOTE — PROGRESS NOTE ADULT - PROBLEM SELECTOR PLAN 1
- 6/14 PCR negative, Repeat PCR 6/17  - Continue Isolation per protocol, can come off isolation 6/21  - no indication for steroids or remdesivir given 100% on room air  - Pt can D/c back to [facility] on 6/21  - CM/SW following - 6/14 PCR negative, Repeat PCR 6/17  - Continue Isolation per protocol, can come off isolation 6/21  - no indication for steroids or remdesivir given 100% on room air  - Pt can D/c back to [facility] on 6/21  - CM/SW following  - updated daughter Batool regarding pt/s condition and plan of care

## 2021-06-16 NOTE — PROGRESS NOTE ADULT - SUBJECTIVE AND OBJECTIVE BOX
[   ] ICU                                          [   ] CCU                                      [  X ] Medical Floor      Patient is a 75 year old with rectal bleeding. GI consulted to evaluate.      Patient is a 75 year old female from [place], with past medical history significant for Parkinson's, COPD, diverticulosis, HTN, DM and HLD, who presented to the ED due to rectal bleed. History is limited from patient due to her being a poor historian. As per ED provider note, patient was sent to the ED for concern for vaginal bleed. In ED, patient is noted to have rectal bleed instead of vaginal bleed. Patient is confused unable to provide any history. No abdominal pain, nausea, vomiting, hematemesis, melena, fever, chills, chest pain, SOB, cough, hematuria, dysuria or diarrhea reported.       Today patient appears comfortable with no new complaints. No abdominal pain, N/V, hematemesis, hematochezia, melena, fever, chills, chest pain, SOB, cough or diarrhea reported.    PAIN MANAGEMENT:  Pain Scale:                0/10  Pain Location:      Prior Colonoscopy: Unknown    PAST MEDICAL HISTORY  Parkinson disease  COPD  Diverticulosis  HTN (hypertension)  Diabetes mellitus  HLD (hyperlipidemia)      PAST SURGICAL HISTORY  No significant past surgical history      Allergies    No Known Allergies    Intolerances  None        SOCIAL HISTORY  Advanced Directives:       [ X ] Full Code       [  ] DNR  Marital Status:         [  ] M      [ X ] S      [  ] D       [  ] W  Children:       [ X ] Yes      [  ] No  Occupation:        [  ] Employed       [ X ] Unemployed       [  ] Retired  Diet:       [ X ] Regular       [  ] PEG feeding          [  ] NG tube feeding  Drug Use:           [ X ] Patient denied          [  ] Yes  Alcohol:           [ X ] No             [  ] Yes (socially)         [  ] Yes (chronic)  Tobacco:           [  ] Yes           [ X ] No    FAMILY HISTORY  [ X ] Heart Disease            [ X ] Diabetes             [ X ] HTN             [  ] Colon Cancer             [  ] Stomach Cancer              [  ] Pancreatic Cancer      VITALS  Vital Signs Last 24 Hrs  T(C): 36.6 (16 Jun 2021 05:38), Max: 37.4 (15 Maynor 2021 13:26)  T(F): 97.9 (16 Jun 2021 05:38), Max: 99.4 (15 Maynor 2021 13:26)  HR: 64 (16 Jun 2021 05:38) (64 - 81)  BP: 106/48 (16 Jun 2021 05:38) (90/54 - 111/51)   RR: 15 (16 Jun 2021 05:38) (15 - 18)  SpO2: 95% (16 Jun 2021 05:38) (95% - 99%)       MEDICATIONS  (STANDING):  ascorbic acid 500 milliGRAM(s) Oral daily  carbidopa/levodopa/entacapone 12.5/50/200 1 Tablet(s) Oral three times a day  enalapril 20 milliGRAM(s) Oral two times a day  ferrous    sulfate 325 milliGRAM(s) Oral daily  heparin   Injectable 5000 Unit(s) SubCutaneous every 8 hours  insulin lispro (ADMELOG) corrective regimen sliding scale   SubCutaneous Before meals and at bedtime  oxybutynin XL 5 milliGRAM(s) Oral daily  pantoprazole    Tablet 40 milliGRAM(s) Oral before breakfast  rasagiline Tablet 1 milliGRAM(s) Oral daily  senna 2 Tablet(s) Oral at bedtime    MEDICATIONS  (PRN):  acetaminophen   Tablet .. 650 milliGRAM(s) Oral every 6 hours PRN Temp greater or equal to 38C (100.4F), Moderate Pain (4 - 6)  ALBUTerol    90 MICROgram(s) HFA Inhaler 2 Puff(s) Inhalation every 6 hours PRN Shortness of Breath and/or Wheezing  ondansetron Injectable 4 milliGRAM(s) IV Push every 6 hours PRN Nausea and/or Vomiting  polyethylene glycol 3350 17 Gram(s) Oral daily PRN Constipation                            9.0    5.25  )-----------( 206      ( 15 Maynor 2021 07:57 )             27.3       06-15    136  |  105  |  13  ----------------------------<  151<H>  4.1   |  25  |  0.68    Ca    9.2      15 Maynor 2021 07:57

## 2021-06-16 NOTE — PROGRESS NOTE ADULT - SUBJECTIVE AND OBJECTIVE BOX
no bleeding  no sob    MEDICATIONS  (STANDING):  ascorbic acid 500 milliGRAM(s) Oral daily  carbidopa/levodopa/entacapone 12.5/50/200 1 Tablet(s) Oral three times a day  enalapril 20 milliGRAM(s) Oral two times a day  ferrous    sulfate 325 milliGRAM(s) Oral daily  heparin   Injectable 5000 Unit(s) SubCutaneous every 8 hours  insulin lispro (ADMELOG) corrective regimen sliding scale   SubCutaneous Before meals and at bedtime  oxybutynin XL 5 milliGRAM(s) Oral daily  pantoprazole    Tablet 40 milliGRAM(s) Oral before breakfast  rasagiline Tablet 1 milliGRAM(s) Oral daily  senna 2 Tablet(s) Oral at bedtime    MEDICATIONS  (PRN):  acetaminophen   Tablet .. 650 milliGRAM(s) Oral every 6 hours PRN Temp greater or equal to 38C (100.4F), Moderate Pain (4 - 6)  ALBUTerol    90 MICROgram(s) HFA Inhaler 2 Puff(s) Inhalation every 6 hours PRN Shortness of Breath and/or Wheezing  ondansetron Injectable 4 milliGRAM(s) IV Push every 6 hours PRN Nausea and/or Vomiting  polyethylene glycol 3350 17 Gram(s) Oral daily PRN Constipation      Allergies    No Known Allergies    Intolerances        Vital Signs Last 24 Hrs  T(C): 36.6 (16 Jun 2021 05:38), Max: 37.4 (15 Maynor 2021 13:26)  T(F): 97.9 (16 Jun 2021 05:38), Max: 99.4 (15 Maynor 2021 13:26)  HR: 64 (16 Jun 2021 05:38) (64 - 81)  BP: 106/48 (16 Jun 2021 05:38) (90/54 - 111/51)  BP(mean): --  RR: 15 (16 Jun 2021 05:38) (15 - 18)  SpO2: 95% (16 Jun 2021 05:38) (95% - 99%)    PHYSICAL EXAM  General: adult in NAD  HEENT: clear oropharynx, anicteric sclera, pink conjunctiva  Neck: supple  CV: normal S1/S2 with no murmur rubs or gallops  Lungs: positive air movement b/l ant lungs,clear to auscultation, no wheezes, no rales  Abdomen: soft non-tender non-distended, no hepatosplenomegaly  Ext: no clubbing cyanosis or edema  Skin: no rashes and no petechiae  Neuro: alert and oriented X 4, no focal deficits  LABS:                          9.0    5.25  )-----------( 206      ( 15 Maynor 2021 07:57 )             27.3         Mean Cell Volume : 87.8 fl  Mean Cell Hemoglobin : 28.9 pg  Mean Cell Hemoglobin Concentration : 33.0 gm/dL  Auto Neutrophil # : x  Auto Lymphocyte # : x  Auto Monocyte # : x  Auto Eosinophil # : x  Auto Basophil # : x  Auto Neutrophil % : x  Auto Lymphocyte % : x  Auto Monocyte % : x  Auto Eosinophil % : x  Auto Basophil % : x    Serial CBC  Hematocrit 27.3  Hemoglobin 9.0  Plat 206  RBC 3.11  WBC 5.25  Serial CBC  Hematocrit 25.8  Hemoglobin 8.5  Plat 204  RBC 2.93  WBC 4.03  Serial CBC  Hematocrit 27.2  Hemoglobin 8.9  Plat 205  RBC 3.12  WBC 5.76    06-15    136  |  105  |  13  ----------------------------<  151<H>  4.1   |  25  |  0.68    Ca    9.2      15 Maynor 2021 07:57                      BLOOD SMEAR INTERPRETATION:       RADIOLOGY & ADDITIONAL STUDIES:

## 2021-06-16 NOTE — PROGRESS NOTE ADULT - SUBJECTIVE AND OBJECTIVE BOX
Patient is a 75y old  Female who presents with a chief complaint of rectal bleed (15 Maynor 2021 16:22)    INTERVAL HPI/OVERNIGHT EVENTS: no new complaints, more talkative and alert today,     REVIEW OF SYSTEMS: unable to obtain due to pt'smental status  Vital Signs Last 24 Hrs  T(C): 36.6 (16 Jun 2021 05:38), Max: 37.4 (15 Maynor 2021 13:26)  T(F): 97.9 (16 Jun 2021 05:38), Max: 99.4 (15 Maynor 2021 13:26)  HR: 64 (16 Jun 2021 05:38) (64 - 81)  BP: 106/48 (16 Jun 2021 05:38) (90/54 - 111/51)  BP(mean): --  RR: 15 (16 Jun 2021 05:38) (15 - 18)  SpO2: 95% (16 Jun 2021 05:38) (95% - 99%)      MEDICATIONS  (STANDING):  ascorbic acid 500 milliGRAM(s) Oral daily  carbidopa/levodopa/entacapone 12.5/50/200 1 Tablet(s) Oral three times a day  enalapril 20 milliGRAM(s) Oral two times a day  ferrous    sulfate 325 milliGRAM(s) Oral daily  heparin   Injectable 5000 Unit(s) SubCutaneous every 8 hours  insulin lispro (ADMELOG) corrective regimen sliding scale   SubCutaneous Before meals and at bedtime  oxybutynin XL 5 milliGRAM(s) Oral daily  pantoprazole    Tablet 40 milliGRAM(s) Oral before breakfast  rasagiline Tablet 1 milliGRAM(s) Oral daily  senna 2 Tablet(s) Oral at bedtime    MEDICATIONS  (PRN):  acetaminophen   Tablet .. 650 milliGRAM(s) Oral every 6 hours PRN Temp greater or equal to 38C (100.4F), Moderate Pain (4 - 6)  ALBUTerol    90 MICROgram(s) HFA Inhaler 2 Puff(s) Inhalation every 6 hours PRN Shortness of Breath and/or Wheezing  ondansetron Injectable 4 milliGRAM(s) IV Push every 6 hours PRN Nausea and/or Vomiting  polyethylene glycol 3350 17 Gram(s) Oral daily PRN Constipation      PHYSICAL EXAM:  GENERAL: NAD  EYES: clear conjunctiva; EOMI  ENMT: Moist mucous membranes  NECK: Supple, No JVD, Normal thyroid  CHEST/LUNG: Clear to auscultation bilaterally; No rales, rhonchi, wheezing, or rubs  HEART: S1, S2, Regular rate and rhythm  ABDOMEN: Soft, Nontender, Nondistended; Bowel sounds present  NEURO: Alert & Oriented X1  EXTREMITIES: No LE edema, no calf tenderness  LYMPH: No lymphadenopathy noted  SKIN: No rashes or lesions    LABS:                        9.0    5.25  )-----------( 206      ( 15 Maynor 2021 07:57 )             27.3     06-15    136  |  105  |  13  ----------------------------<  151<H>  4.1   |  25  |  0.68    Ca    9.2      15 Maynor 2021 07:57                            LABS:                        9.0    5.25  )-----------( 206      ( 15 Maynor 2021 07:57 )             27.3     06-15    136  |  105  |  13  ----------------------------<  151<H>  4.1   |  25  |  0.68    Ca    9.2      15 Maynor 2021 07:57    CAPILLARY BLOOD GLUCOSE    POCT Blood Glucose.: 116 mg/dL (16 Jun 2021 07:47)  POCT Blood Glucose.: 119 mg/dL (15 Maynor 2021 21:48)  POCT Blood Glucose.: 112 mg/dL (15 Maynor 2021 17:07)  POCT Blood Glucose.: 171 mg/dL (15 Maynor 2021 11:43)      RADIOLOGY & ADDITIONAL TESTS:    Imaging Personally Reviewed:  [ x] YES  [ ] NO  < from: IR Fluoroscopy <1 Hour (06.10.21 @ 11:36) >    EXAM:  IR PROCEDURE                          EXAM:  SP INS IVC FILTER SISC                          EXAM:  SP US GUID VASC ACCESS                          EXAM:  SP FLUOR GUID CV CATH PROC SI                          EXAM:  SP FLUOROSCOPY TO 1 HOUR                           PROCEDURE DATE:  06/10/2021          INTERPRETATION:  PROCEDURE: IVC FILTER PLACEMENT. IVC VENOGRAM    : JOANN Edwards MD, YULY Stone    IV CONTRAST: 35 mL Omnipaque-350 was used.    CLINICAL INDICATION: 75-year-old female with multiple comorbidities and bilateral lower extremity DVT, not a candidate for anticoagulation due to GI bleeding. Inferior vena cava filter placement was requested.    ANESTHESIA: A total of 8 mL of 1% lidocaine was used for local anesthesia.    TECHNIQUE: After discussing the risks, benefits and alternatives to the procedure, informed consent was obtained. A time out was performed prior to the procedure.    The patient was placed in supine position and connected to physiologic monitoring. Ultrasound examination of the right upper arm was performed, which demonstrated a patent and compressible brachial vein. Ultrasound images were captured and saved in PACS. An attempt was made to place the filter via the right brachial vein, however after inability to advance the mandril wire centrally, a venogram was performed via the introducer catheter demonstrating stenosis and thrombosis of the right axillary vein. The catheter was removed and hemostasis was achieved with manual compression. Access via the right jugular vein was not attempted due to contracted state of the patient.    Ultrasound examination of the right groin was performed, which demonstrated a patent and compressible common femoral vein. Ultrasound images were captured and saved in PACS. The right groin was prepped and draped in the usual sterile fashion. Under ultrasound guidance, access to the right common femoral vein was obtained with a micropuncture kit. The wire and inner dilator were removed and a venogram was performed, revealing a widely patent inferior vena cava. The renal vein inflows were identified. The catheter was then exchanged for an introducer catheter over a 0.035 inch guidewire. A Vena Tech IVC filter was then deployed within the infrarenal inferior vena cava with its apex positioned at the level of the superior endplate of the L2 vertebral body. The introducer catheter was removed and hemostasis was achieved by manual compression over a hemostatic patch. A dry sterile dressing was applied.    The patient tolerated the procedure and was transferred from the radiology department in stable condition.    IMPRESSION:  SUCCESSFUL INSERTION OF VENA TECH IVC FILTER INTO THE INFRARENAL INFERIOR VENA CAVA AS DESCRIBED ABOVE.            EUGENIO EDWARDS MD; Attending Interventional Radiologist  This document has been electronically signed. Maynor 10 2021  1:16PM    < end of copied text >  Assessment and Plan:   · Assessment	  1. Rectal bleeding stopped  2. Anemia  3. S/p Colonoscopy  4. Rectal ulcer  5. Diverticulosis  6. Large hemorrhoids  7. No evidence of acute GI bleeding at present time    Suggestions:    1. Monitor H/H  2. Transfuse PRBC as needed  3. Avoid NSAID  4. Protonix 40mg daily  5. DVT prophylaxis        Electronic Signatures:  Mike Galvez)  (Signed 15-Maynor-2021 16:30)

## 2021-06-16 NOTE — PROGRESS NOTE ADULT - ASSESSMENT
75 year old female from [place] with past medical history of Parkinson's Disease, COPD, diverticulosis, HTN, DM and HLD who presented to the ED for c/o rectal bleeding. CT A/P showed rectal wall thickening, colonic diverticulosis without diverticulitis, right adnexal mass, and DVT in LLE (left common femoral, femoral and deep femoral veins), and US duplex + for RLE DVT - patient refused to have the LLE examined. Patient is now s/p IVC filter placement for DVT given cannot tolerate AC's in the setting of GIB - had episode of bleeding while on heparin gtt. Colonoscopy with finding of rectal ulcer now s/p 2 clips. no active bleeding noted in colonoscopy.  Heme/onc onboard for hypercoagulable workup and adnexal mass - ultrasound with uterine fibroid. Patient now pending placement, COVID-19 PCR negative since 6/14. Pt can D/c on 6/21 back to NH

## 2021-06-17 LAB
GLUCOSE BLDC GLUCOMTR-MCNC: 112 MG/DL — HIGH (ref 70–99)
GLUCOSE BLDC GLUCOMTR-MCNC: 153 MG/DL — HIGH (ref 70–99)
GLUCOSE BLDC GLUCOMTR-MCNC: 160 MG/DL — HIGH (ref 70–99)
GLUCOSE BLDC GLUCOMTR-MCNC: 263 MG/DL — HIGH (ref 70–99)
SARS-COV-2 RNA SPEC QL NAA+PROBE: SIGNIFICANT CHANGE UP

## 2021-06-17 RX ADMIN — CARBIDOPA, LEVODOPA, AND ENTACAPONE 1 TABLET(S): 50; 200; 200 TABLET, FILM COATED ORAL at 13:07

## 2021-06-17 RX ADMIN — Medication 3: at 11:45

## 2021-06-17 RX ADMIN — Medication 325 MILLIGRAM(S): at 11:45

## 2021-06-17 RX ADMIN — HEPARIN SODIUM 5000 UNIT(S): 5000 INJECTION INTRAVENOUS; SUBCUTANEOUS at 06:14

## 2021-06-17 RX ADMIN — Medication 20 MILLIGRAM(S): at 17:25

## 2021-06-17 RX ADMIN — CARBIDOPA, LEVODOPA, AND ENTACAPONE 1 TABLET(S): 50; 200; 200 TABLET, FILM COATED ORAL at 21:57

## 2021-06-17 RX ADMIN — RASAGILINE 1 MILLIGRAM(S): 0.5 TABLET ORAL at 11:46

## 2021-06-17 RX ADMIN — SENNA PLUS 2 TABLET(S): 8.6 TABLET ORAL at 21:56

## 2021-06-17 RX ADMIN — PANTOPRAZOLE SODIUM 40 MILLIGRAM(S): 20 TABLET, DELAYED RELEASE ORAL at 06:14

## 2021-06-17 RX ADMIN — Medication 20 MILLIGRAM(S): at 06:14

## 2021-06-17 RX ADMIN — Medication 5 MILLIGRAM(S): at 11:45

## 2021-06-17 RX ADMIN — HEPARIN SODIUM 5000 UNIT(S): 5000 INJECTION INTRAVENOUS; SUBCUTANEOUS at 21:56

## 2021-06-17 RX ADMIN — Medication 1: at 21:57

## 2021-06-17 RX ADMIN — CARBIDOPA, LEVODOPA, AND ENTACAPONE 1 TABLET(S): 50; 200; 200 TABLET, FILM COATED ORAL at 06:14

## 2021-06-17 RX ADMIN — Medication 500 MILLIGRAM(S): at 11:45

## 2021-06-17 RX ADMIN — HEPARIN SODIUM 5000 UNIT(S): 5000 INJECTION INTRAVENOUS; SUBCUTANEOUS at 13:07

## 2021-06-17 RX ADMIN — Medication 1: at 08:01

## 2021-06-17 NOTE — PROGRESS NOTE ADULT - SUBJECTIVE AND OBJECTIVE BOX
Patient is a 75y old  Female who presents with a chief complaint of rectal bleed (17 Jun 2021 08:07)    INTERVAL HPI/OVERNIGHT EVENTS: no new complaints, pleasantly confused    REVIEW OF SYSTEMS: unable to obtain due to pt's mental status    T(C): 36.4 (06-17-21 @ 05:27), Max: 36.8 (06-16-21 @ 14:11)  HR: 76 (06-17-21 @ 05:27) (73 - 86)  BP: 151/72 (06-17-21 @ 05:27) (122/66 - 151/72)  RR: 17 (06-17-21 @ 05:27) (17 - 18)  SpO2: 93% (06-17-21 @ 05:27) (93% - 100%)  Wt(kg): --Vital Signs Last 24 Hrs  T(C): 36.4 (17 Jun 2021 05:27), Max: 36.8 (16 Jun 2021 14:11)  T(F): 97.6 (17 Jun 2021 05:27), Max: 98.3 (16 Jun 2021 14:11)  HR: 76 (17 Jun 2021 05:27) (73 - 86)  BP: 151/72 (17 Jun 2021 05:27) (122/66 - 151/72)  BP(mean): --  RR: 17 (17 Jun 2021 05:27) (17 - 18)  SpO2: 93% (17 Jun 2021 05:27) (93% - 100%)    MEDICATIONS  (STANDING):  ascorbic acid 500 milliGRAM(s) Oral daily  carbidopa/levodopa/entacapone 12.5/50/200 1 Tablet(s) Oral three times a day  enalapril 20 milliGRAM(s) Oral two times a day  ferrous    sulfate 325 milliGRAM(s) Oral daily  heparin   Injectable 5000 Unit(s) SubCutaneous every 8 hours  insulin lispro (ADMELOG) corrective regimen sliding scale   SubCutaneous Before meals and at bedtime  oxybutynin XL 5 milliGRAM(s) Oral daily  pantoprazole    Tablet 40 milliGRAM(s) Oral before breakfast  rasagiline Tablet 1 milliGRAM(s) Oral daily  senna 2 Tablet(s) Oral at bedtime    MEDICATIONS  (PRN):  acetaminophen   Tablet .. 650 milliGRAM(s) Oral every 6 hours PRN Temp greater or equal to 38C (100.4F), Moderate Pain (4 - 6)  ALBUTerol    90 MICROgram(s) HFA Inhaler 2 Puff(s) Inhalation every 6 hours PRN Shortness of Breath and/or Wheezing  ondansetron Injectable 4 milliGRAM(s) IV Push every 6 hours PRN Nausea and/or Vomiting  polyethylene glycol 3350 17 Gram(s) Oral daily PRN Constipation    PHYSICAL EXAM:  GENERAL: NAD  EYES: clear conjunctiva; EOMI  ENMT: Moist mucous membranes  NECK: Supple, No JVD, Normal thyroid  CHEST/LUNG: Clear to auscultation bilaterally; No rales, rhonchi, wheezing, or rubs  HEART: S1, S2, Regular rate and rhythm  ABDOMEN: Soft, Nontender, Nondistended; Bowel sounds present  NEURO: Alert & Oriented X3  EXTREMITIES: No LE edema, no calf tenderness  LYMPH: No lymphadenopathy noted  SKIN: No rashes or lesions    Consultant(s) Notes Reviewed:  [x ] YES  [ ] NO  Care Discussed with Consultants/Other Providers [ x] YES  [ ] NO  L  LABS:    CAPILLARY BLOOD GLUCOSE      POCT Blood Glucose.: 153 mg/dL (17 Jun 2021 07:52)  POCT Blood Glucose.: 117 mg/dL (16 Jun 2021 21:25)  POCT Blood Glucose.: 125 mg/dL (16 Jun 2021 17:10)  RADIOLOGY & ADDITIONAL TESTS:    Imaging Personally Reviewed:  [x ] YES  [ ] NO    < from: IR Fluoroscopy <1 Hour (06.10.21 @ 11:36) >  EXAM:  IR PROCEDURE                          EXAM:  SP INS IVC FILTER SISC                          EXAM:  SP US GUID VASC ACCESS                          EXAM:  SP FLUOR GUID CV CATH PROC SI                          EXAM:  SP FLUOROSCOPY TO 1 HOUR                           PROCEDURE DATE:  06/10/2021          INTERPRETATION:  PROCEDURE: IVC FILTER PLACEMENT. IVC VENOGRAM    : JOANN Draper MD, YULY Stone    IV CONTRAST: 35 mL Omnipaque-350 was used.    CLINICAL INDICATION: 75-year-old female with multiple comorbidities and bilateral lower extremity DVT, not a candidate for anticoagulation due to GI bleeding. Inferior vena cava filter placement was requested.    ANESTHESIA: A total of 8 mL of 1% lidocaine was used for local anesthesia.    TECHNIQUE: After discussing the risks, benefits and alternatives to the procedure, informed consent was obtained. A time out was performed prior to the procedure.    The patient was placed in supine position and connected to physiologic monitoring. Ultrasound examination of the right upper arm was performed, which demonstrated a patent and compressible brachial vein. Ultrasound images were captured and saved in PACS. An attempt was made to place the filter via the right brachial vein, however after inability to advance the mandril wire centrally, a venogram was performed via the introducer catheter demonstrating stenosis and thrombosis of the right axillary vein. The catheter was removed and hemostasis was achieved with manual compression. Access via the right jugular vein was not attempted due to contracted state of the patient.    Ultrasound examination of the right groin was performed, which demonstrated a patent and compressible common femoral vein. Ultrasound images were captured and saved in PACS. The right groin was prepped and draped in the usual sterile fashion. Under ultrasound guidance, access to the right common femoral vein was obtained with a micropuncture kit. The wire and inner dilator were removed and a venogram was performed, revealing a widely patent inferior vena cava. The renal vein inflows were identified. The catheter was then exchanged for an introducer catheter over a 0.035 inch guidewire. A Vena Tech IVC filter was then deployed within the infrarenal inferior vena cava with its apex positioned at the level of the superior endplate of the L2 vertebral body. The introducer catheter was removed and hemostasis was achieved by manual compression over a hemostatic patch. A dry sterile dressing was applied.    The patient tolerated the procedure and was transferred from the radiology department in stable condition.    IMPRESSION:  SUCCESSFUL INSERTION OF VENA TECH IVC FILTER INTO THE INFRARENAL INFERIOR VENA CAVA AS DESCRIBED ABOVE.            EUGENIO DRAPER MD; Attending Interventional Radiologist  This document has been electronically signed. Maynor 10 2021  1:16PM    < end of copied text >  < from: US Pelvis Complete (US Pelvis Complete .) (06.09.21 @ 15:51) >  EXAM:  US PELVIC COMPLETE                            PROCEDURE DATE:  06/09/2021          INTERPRETATION:  CLINICAL INFORMATION: Right adnexal mass on recent abdominal/pelvic CT dated 6/1/2021    LMP: Postmenopausal    COMPARISON: No prior pelvic ultrasound is available for comparison. Reference is made with a previous abdominal/pelvic CT dated 6/1/2021.    TECHNIQUE:  Transabdominal pelvic sonogram only.    FINDINGS:    Uterus: 6.3 cm x 3.5 cm x 4.4 cm. 3.1 x 3.6 x 3.4 cm subserosal exophytic right uterine fibroid.  Endometrium: 4 mm. Within normal limits.    The ovaries are not visualized due to abundant bowel gas in the adnexa.    Sheppard catheter in the urinary bladder. Dependent debris in the urinary bladder.    Fluid: No pelvic free fluid.    IMPRESSION:  3.6 cm right uterine fibroid.      MIKE BUENROSTRO MD; Attending Radiologist  This document has been electronically signed. Jun 9 2021  4:28PM    < end of copied text >

## 2021-06-17 NOTE — PROGRESS NOTE ADULT - SUBJECTIVE AND OBJECTIVE BOX
[   ] ICU                                          [   ] CCU                                      [  X ] Medical Floor      Patient is a 75 year old with rectal bleeding. GI consulted to evaluate.      Patient is a 75 year old female from [place], with past medical history significant for Parkinson's, COPD, diverticulosis, HTN, DM and HLD, who presented to the ED due to rectal bleed. History is limited from patient due to her being a poor historian. As per ED provider note, patient was sent to the ED for concern for vaginal bleed. In ED, patient is noted to have rectal bleed instead of vaginal bleed. Patient is confused unable to provide any history. No abdominal pain, nausea, vomiting, hematemesis, melena, fever, chills, chest pain, SOB, cough, hematuria, dysuria or diarrhea reported.       Today patient appears comfortable with no new complaints. No abdominal pain, N/V, hematemesis, hematochezia, melena, fever, chills, chest pain, SOB, cough or diarrhea reported.      PAIN MANAGEMENT:  Pain Scale:                0/10  Pain Location:      Prior Colonoscopy: Unknown    PAST MEDICAL HISTORY  Parkinson disease  COPD  Diverticulosis  HTN (hypertension)  Diabetes mellitus  HLD (hyperlipidemia)      PAST SURGICAL HISTORY  No significant past surgical history      Allergies    No Known Allergies    Intolerances  None        SOCIAL HISTORY  Advanced Directives:       [ X ] Full Code       [  ] DNR  Marital Status:         [  ] M      [ X ] S      [  ] D       [  ] W  Children:       [ X ] Yes      [  ] No  Occupation:        [  ] Employed       [ X ] Unemployed       [  ] Retired  Diet:       [ X ] Regular       [  ] PEG feeding          [  ] NG tube feeding  Drug Use:           [ X ] Patient denied          [  ] Yes  Alcohol:           [ X ] No             [  ] Yes (socially)         [  ] Yes (chronic)  Tobacco:           [  ] Yes           [ X ] No      FAMILY HISTORY  [ X ] Heart Disease            [ X ] Diabetes             [ X ] HTN             [  ] Colon Cancer             [  ] Stomach Cancer              [  ] Pancreatic Cancer      VITALS  Vital Signs Last 24 Hrs  T(C): 36.4 (17 Jun 2021 05:27), Max: 36.8 (16 Jun 2021 14:11)  T(F): 97.6 (17 Jun 2021 05:27), Max: 98.3 (16 Jun 2021 14:11)  HR: 76 (17 Jun 2021 05:27) (73 - 86)  BP: 151/72 (17 Jun 2021 05:27) (122/66 - 151/72)   RR: 17 (17 Jun 2021 05:27) (17 - 18)  SpO2: 93% (17 Jun 2021 05:27) (93% - 100%)       MEDICATIONS  (STANDING):  ascorbic acid 500 milliGRAM(s) Oral daily  carbidopa/levodopa/entacapone 12.5/50/200 1 Tablet(s) Oral three times a day  enalapril 20 milliGRAM(s) Oral two times a day  ferrous    sulfate 325 milliGRAM(s) Oral daily  heparin   Injectable 5000 Unit(s) SubCutaneous every 8 hours  insulin lispro (ADMELOG) corrective regimen sliding scale   SubCutaneous Before meals and at bedtime  oxybutynin XL 5 milliGRAM(s) Oral daily  pantoprazole    Tablet 40 milliGRAM(s) Oral before breakfast  rasagiline Tablet 1 milliGRAM(s) Oral daily  senna 2 Tablet(s) Oral at bedtime    MEDICATIONS  (PRN):  acetaminophen   Tablet .. 650 milliGRAM(s) Oral every 6 hours PRN Temp greater or equal to 38C (100.4F), Moderate Pain (4 - 6)  ALBUTerol    90 MICROgram(s) HFA Inhaler 2 Puff(s) Inhalation every 6 hours PRN Shortness of Breath and/or Wheezing  ondansetron Injectable 4 milliGRAM(s) IV Push every 6 hours PRN Nausea and/or Vomiting  polyethylene glycol 3350 17 Gram(s) Oral daily PRN Constipation

## 2021-06-17 NOTE — PROGRESS NOTE ADULT - SUBJECTIVE AND OBJECTIVE BOX
Patient is a 75y old  Female who presents with a chief complaint of rectal bleed (17 Jun 2021 08:07)    INTERVAL HPI/OVERNIGHT EVENTS: no new complaints, pleasantly confused    REVIEW OF SYSTEMS: unable to obtain due to pt's mental status    T(C): 36.4 (06-17-21 @ 05:27), Max: 36.8 (06-16-21 @ 14:11)  HR: 76 (06-17-21 @ 05:27) (73 - 86)  BP: 151/72 (06-17-21 @ 05:27) (122/66 - 151/72)  RR: 17 (06-17-21 @ 05:27) (17 - 18)  SpO2: 93% (06-17-21 @ 05:27) (93% - 100%)  Wt(kg): --Vital Signs Last 24 Hrs  T(C): 36.4 (17 Jun 2021 05:27), Max: 36.8 (16 Jun 2021 14:11)  T(F): 97.6 (17 Jun 2021 05:27), Max: 98.3 (16 Jun 2021 14:11)  HR: 76 (17 Jun 2021 05:27) (73 - 86)  BP: 151/72 (17 Jun 2021 05:27) (122/66 - 151/72)  BP(mean): --  RR: 17 (17 Jun 2021 05:27) (17 - 18)  SpO2: 93% (17 Jun 2021 05:27) (93% - 100%)    MEDICATIONS  (STANDING):  ascorbic acid 500 milliGRAM(s) Oral daily  carbidopa/levodopa/entacapone 12.5/50/200 1 Tablet(s) Oral three times a day  enalapril 20 milliGRAM(s) Oral two times a day  ferrous    sulfate 325 milliGRAM(s) Oral daily  heparin   Injectable 5000 Unit(s) SubCutaneous every 8 hours  insulin lispro (ADMELOG) corrective regimen sliding scale   SubCutaneous Before meals and at bedtime  oxybutynin XL 5 milliGRAM(s) Oral daily  pantoprazole    Tablet 40 milliGRAM(s) Oral before breakfast  rasagiline Tablet 1 milliGRAM(s) Oral daily  senna 2 Tablet(s) Oral at bedtime    MEDICATIONS  (PRN):  acetaminophen   Tablet .. 650 milliGRAM(s) Oral every 6 hours PRN Temp greater or equal to 38C (100.4F), Moderate Pain (4 - 6)  ALBUTerol    90 MICROgram(s) HFA Inhaler 2 Puff(s) Inhalation every 6 hours PRN Shortness of Breath and/or Wheezing  ondansetron Injectable 4 milliGRAM(s) IV Push every 6 hours PRN Nausea and/or Vomiting  polyethylene glycol 3350 17 Gram(s) Oral daily PRN Constipation    PHYSICAL EXAM:  GENERAL: NAD  EYES: clear conjunctiva; EOMI  ENMT: Moist mucous membranes  NECK: Supple, No JVD, Normal thyroid  CHEST/LUNG: Clear to auscultation bilaterally; No rales, rhonchi, wheezing, or rubs  HEART: S1, S2, Regular rate and rhythm  ABDOMEN: Soft, Nontender, Nondistended; Bowel sounds present  NEURO: Alert & Oriented X3  EXTREMITIES: No LE edema, no calf tenderness  LYMPH: No lymphadenopathy noted  SKIN: No rashes or lesions    Consultant(s) Notes Reviewed:  [x ] YES  [ ] NO  Care Discussed with Consultants/Other Providers [ x] YES  [ ] NO    LABS:    CAPILLARY BLOOD GLUCOSE      POCT Blood Glucose.: 153 mg/dL (17 Jun 2021 07:52)  POCT Blood Glucose.: 117 mg/dL (16 Jun 2021 21:25)  POCT Blood Glucose.: 125 mg/dL (16 Jun 2021 17:10)  RADIOLOGY & ADDITIONAL TESTS:    Imaging Personally Reviewed:  [x ] YES  [ ] NO    < from: IR Fluoroscopy <1 Hour (06.10.21 @ 11:36) >  EXAM:  IR PROCEDURE                          EXAM:  SP INS IVC FILTER SISC                          EXAM:  SP US GUID VASC ACCESS                          EXAM:  SP FLUOR GUID CV CATH PROC SI                          EXAM:  SP FLUOROSCOPY TO 1 HOUR                           PROCEDURE DATE:  06/10/2021          INTERPRETATION:  PROCEDURE: IVC FILTER PLACEMENT. IVC VENOGRAM    : JOANN Draper MD, YULY Stone    IV CONTRAST: 35 mL Omnipaque-350 was used.    CLINICAL INDICATION: 75-year-old female with multiple comorbidities and bilateral lower extremity DVT, not a candidate for anticoagulation due to GI bleeding. Inferior vena cava filter placement was requested.    ANESTHESIA: A total of 8 mL of 1% lidocaine was used for local anesthesia.    TECHNIQUE: After discussing the risks, benefits and alternatives to the procedure, informed consent was obtained. A time out was performed prior to the procedure.    The patient was placed in supine position and connected to physiologic monitoring. Ultrasound examination of the right upper arm was performed, which demonstrated a patent and compressible brachial vein. Ultrasound images were captured and saved in PACS. An attempt was made to place the filter via the right brachial vein, however after inability to advance the mandril wire centrally, a venogram was performed via the introducer catheter demonstrating stenosis and thrombosis of the right axillary vein. The catheter was removed and hemostasis was achieved with manual compression. Access via the right jugular vein was not attempted due to contracted state of the patient.    Ultrasound examination of the right groin was performed, which demonstrated a patent and compressible common femoral vein. Ultrasound images were captured and saved in PACS. The right groin was prepped and draped in the usual sterile fashion. Under ultrasound guidance, access to the right common femoral vein was obtained with a micropuncture kit. The wire and inner dilator were removed and a venogram was performed, revealing a widely patent inferior vena cava. The renal vein inflows were identified. The catheter was then exchanged for an introducer catheter over a 0.035 inch guidewire. A Vena Tech IVC filter was then deployed within the infrarenal inferior vena cava with its apex positioned at the level of the superior endplate of the L2 vertebral body. The introducer catheter was removed and hemostasis was achieved by manual compression over a hemostatic patch. A dry sterile dressing was applied.    The patient tolerated the procedure and was transferred from the radiology department in stable condition.    IMPRESSION:  SUCCESSFUL INSERTION OF VENA TECH IVC FILTER INTO THE INFRARENAL INFERIOR VENA CAVA AS DESCRIBED ABOVE.            EUGENIO DRAPER MD; Attending Interventional Radiologist  This document has been electronically signed. Maynor 10 2021  1:16PM    < end of copied text >  < from: US Pelvis Complete (US Pelvis Complete .) (06.09.21 @ 15:51) >  EXAM:  US PELVIC COMPLETE                            PROCEDURE DATE:  06/09/2021          INTERPRETATION:  CLINICAL INFORMATION: Right adnexal mass on recent abdominal/pelvic CT dated 6/1/2021    LMP: Postmenopausal    COMPARISON: No prior pelvic ultrasound is available for comparison. Reference is made with a previous abdominal/pelvic CT dated 6/1/2021.    TECHNIQUE:  Transabdominal pelvic sonogram only.    FINDINGS:    Uterus: 6.3 cm x 3.5 cm x 4.4 cm. 3.1 x 3.6 x 3.4 cm subserosal exophytic right uterine fibroid.  Endometrium: 4 mm. Within normal limits.    The ovaries are not visualized due to abundant bowel gas in the adnexa.    Sheppard catheter in the urinary bladder. Dependent debris in the urinary bladder.    Fluid: No pelvic free fluid.    IMPRESSION:  3.6 cm right uterine fibroid.      MIKE BUENROSTRO MD; Attending Radiologist  This document has been electronically signed. Jun 9 2021  4:28PM    < end of copied text >

## 2021-06-17 NOTE — PROGRESS NOTE ADULT - ASSESSMENT
75 year old female from [place] with past medical history of Parkinson's Disease, COPD, diverticulosis, HTN, DM and HLD who presented to the ED for c/o rectal bleeding. CT A/P showed rectal wall thickening, colonic diverticulosis without diverticulitis, right adnexal mass, and DVT in LLE (left common femoral, femoral and deep femoral veins), and US duplex + for RLE DVT - patient refused to have the LLE examined. Patient is now s/p IVC filter placement for DVT given cannot tolerate AC's in the setting of GIB - had episode of bleeding while on heparin gtt. Colonoscopy with finding of rectal ulcer now s/p 2 clips. no active bleeding noted in colonoscopy.  Heme/onc onboard for hypercoagulable workup and adnexal mass - ultrasound with uterine fibroid. Patient now pending placement, COVID-19 PCR negative since 6/14. Pt can D/c on 6/21 back to [facility]

## 2021-06-17 NOTE — PROGRESS NOTE ADULT - PROBLEM SELECTOR PLAN 1
- 6/14 PCR negative, Repeat PCR 6/17  - Continue Isolation per protocol, can come off isolation 6/21  - no indication for steroids or remdesivir given 100% on room air  - Pt can D/c back to [facility] on 6/21  - CM/SW following

## 2021-06-17 NOTE — CHART NOTE - NSCHARTNOTEFT_GEN_A_CORE
Assessment:   75yFemalePatient is a 75y old  Female who presents with a chief complaint of rectal bleed (17 Jun 2021 12:12). Pt visited. Pt  asleep. D/W RN Appetite is poor.  Per Flow sheet Po intake ~25 % of meal. Labs Noted . Will suggest Glucerna shakes BID.       Factors impacting intake: [ ] none [ ] nausea  [ ] vomiting [ ] diarrhea [ ] constipation  [ ]chewing problems [ ] swallowing issues  [ ] other:     Diet Presciption: Diet, Soft (06-06-21 @ 09:32)    Intake: ~25 %     Current Weight:   % Weight Change    Pertinent Medications: MEDICATIONS  (STANDING):  ascorbic acid 500 milliGRAM(s) Oral daily  carbidopa/levodopa/entacapone 12.5/50/200 1 Tablet(s) Oral three times a day  enalapril 20 milliGRAM(s) Oral two times a day  ferrous    sulfate 325 milliGRAM(s) Oral daily  heparin   Injectable 5000 Unit(s) SubCutaneous every 8 hours  insulin lispro (ADMELOG) corrective regimen sliding scale   SubCutaneous Before meals and at bedtime  oxybutynin XL 5 milliGRAM(s) Oral daily  pantoprazole    Tablet 40 milliGRAM(s) Oral before breakfast  rasagiline Tablet 1 milliGRAM(s) Oral daily  senna 2 Tablet(s) Oral at bedtime    MEDICATIONS  (PRN):  acetaminophen   Tablet .. 650 milliGRAM(s) Oral every 6 hours PRN Temp greater or equal to 38C (100.4F), Moderate Pain (4 - 6)  ALBUTerol    90 MICROgram(s) HFA Inhaler 2 Puff(s) Inhalation every 6 hours PRN Shortness of Breath and/or Wheezing  ondansetron Injectable 4 milliGRAM(s) IV Push every 6 hours PRN Nausea and/or Vomiting  polyethylene glycol 3350 17 Gram(s) Oral daily PRN Constipation    Pertinent Labs: 06-15 Na136 mmol/L Glu 151 mg/dL<H> K+ 4.1 mmol/L Cr  0.68 mg/dL BUN 13 mg/dL 06-14 Phos 2.7 mg/dL 06-02 Chol 138 mg/dL LDL --    HDL 39 mg/dL<L> Trig 92 mg/dL     CAPILLARY BLOOD GLUCOSE      POCT Blood Glucose.: 263 mg/dL (17 Jun 2021 11:28)  POCT Blood Glucose.: 153 mg/dL (17 Jun 2021 07:52)  POCT Blood Glucose.: 117 mg/dL (16 Jun 2021 21:25)  POCT Blood Glucose.: 125 mg/dL (16 Jun 2021 17:10)    Skin:     Estimated Needs:   [ ] no change since previous assessment  [ ] recalculated:     Previous Nutrition Diagnosis:   [ ] Inadequate Energy Intake [ x]Inadequate Oral Intake [ ] Excessive Energy Intake   [ ] Underweight [ ] Increased Nutrient Needs [ ] Overweight/Obesity   [ ] Altered GI Function [ ] Unintended Weight Loss [ ] Food & Nutrition Related Knowledge Deficit [ ] Malnutrition     Nutrition Diagnosis is [ x] ongoing  [ ] resolved [ ] not applicable     New Nutrition Diagnosis: [ ] not applicable       Interventions:   Recommend  [ ] Change Diet To:  [x ] Nutrition Supplement Glucerna shakes BID   [ ] Nutrition Support  [ x] Other: Continue to feed pt     Monitoring and Evaluation:   [ ] PO intake [ x ] Tolerance to diet prescription [ x ] weights [ x ] labs[ x ] follow up per protocol  [ ] other: Assessment:   75yFemalePatient is a 75y old  Female who presents with a chief complaint of rectal bleed (17 Jun 2021 12:12). Pt visited. Pt  asleep. D/W RN Appetite is poor. Fed by staff.   Per Flow sheet Po intake ~25 % of meal. Labs Noted . Will suggest Glucerna shakes BID. D/W NP .       Factors impacting intake: [ ] none [ ] nausea  [ ] vomiting [ ] diarrhea [ ] constipation  [ ]chewing problems [ ] swallowing issues  [ ] other:     Diet Prescription: Diet, Soft (06-06-21 @ 09:32)    Intake: ~25 %     Current Weight:   % Weight Change    Pertinent Medications: MEDICATIONS  (STANDING):  ascorbic acid 500 milliGRAM(s) Oral daily  carbidopa/levodopa/entacapone 12.5/50/200 1 Tablet(s) Oral three times a day  enalapril 20 milliGRAM(s) Oral two times a day  ferrous    sulfate 325 milliGRAM(s) Oral daily  heparin   Injectable 5000 Unit(s) SubCutaneous every 8 hours  insulin lispro (ADMELOG) corrective regimen sliding scale   SubCutaneous Before meals and at bedtime  oxybutynin XL 5 milliGRAM(s) Oral daily  pantoprazole    Tablet 40 milliGRAM(s) Oral before breakfast  rasagiline Tablet 1 milliGRAM(s) Oral daily  senna 2 Tablet(s) Oral at bedtime    MEDICATIONS  (PRN):  acetaminophen   Tablet .. 650 milliGRAM(s) Oral every 6 hours PRN Temp greater or equal to 38C (100.4F), Moderate Pain (4 - 6)  ALBUTerol    90 MICROgram(s) HFA Inhaler 2 Puff(s) Inhalation every 6 hours PRN Shortness of Breath and/or Wheezing  ondansetron Injectable 4 milliGRAM(s) IV Push every 6 hours PRN Nausea and/or Vomiting  polyethylene glycol 3350 17 Gram(s) Oral daily PRN Constipation    Pertinent Labs: 06-15 Na136 mmol/L Glu 151 mg/dL<H> K+ 4.1 mmol/L Cr  0.68 mg/dL BUN 13 mg/dL 06-14 Phos 2.7 mg/dL 06-02 Chol 138 mg/dL LDL --    HDL 39 mg/dL<L> Trig 92 mg/dL     CAPILLARY BLOOD GLUCOSE      POCT Blood Glucose.: 263 mg/dL (17 Jun 2021 11:28)  POCT Blood Glucose.: 153 mg/dL (17 Jun 2021 07:52)  POCT Blood Glucose.: 117 mg/dL (16 Jun 2021 21:25)  POCT Blood Glucose.: 125 mg/dL (16 Jun 2021 17:10)    Skin:     Estimated Needs:   [ ] no change since previous assessment  [ ] recalculated:     Previous Nutrition Diagnosis:   [ ] Inadequate Energy Intake [ x]Inadequate Oral Intake [ ] Excessive Energy Intake   [ ] Underweight [ ] Increased Nutrient Needs [ ] Overweight/Obesity   [ ] Altered GI Function [ ] Unintended Weight Loss [ ] Food & Nutrition Related Knowledge Deficit [ ] Malnutrition     Nutrition Diagnosis is [ x] ongoing  [ ] resolved [ ] not applicable     New Nutrition Diagnosis: [ ] not applicable       Interventions:   Recommend  [ ] Change Diet To:  [x ] Nutrition Supplement Glucerna shakes BID   [ ] Nutrition Support  [ x] Other: Continue to feed pt     Monitoring and Evaluation:   [ ] PO intake [ x ] Tolerance to diet prescription [ x ] weights [ x ] labs[ x ] follow up per protocol  [ ] other:

## 2021-06-17 NOTE — PROGRESS NOTE ADULT - ASSESSMENT
75 year old female from [place] with past medical history of Parkinson's Disease, COPD, diverticulosis, HTN, DM and HLD who presented to the ED for c/o rectal bleeding. CT A/P showed rectal wall thickening, colonic diverticulosis without diverticulitis, right adnexal mass, and DVT in LLE (left common femoral, femoral and deep femoral veins), and US duplex + for RLE DVT - patient refused to have the LLE examined. Patient is now s/p IVC filter placement for DVT given cannot tolerate AC's in the setting of GIB - had episode of bleeding while on heparin gtt. Colonoscopy with finding of rectal ulcer now s/p 2 clips. no active bleeding noted in colonoscopy.  Heme/onc onboard for hypercoagulable workup and adnexal mass - ultrasound with uterine fibroid. Patient now pending placement, COVID-19 PCR negative since 6/14. Pt can D/c on 6/21 back to NH

## 2021-06-18 LAB
ALBUMIN SERPL ELPH-MCNC: 2.4 G/DL — LOW (ref 3.5–5)
ALP SERPL-CCNC: 74 U/L — SIGNIFICANT CHANGE UP (ref 40–120)
ALT FLD-CCNC: 8 U/L DA — LOW (ref 10–60)
ANION GAP SERPL CALC-SCNC: 7 MMOL/L — SIGNIFICANT CHANGE UP (ref 5–17)
AST SERPL-CCNC: 15 U/L — SIGNIFICANT CHANGE UP (ref 10–40)
BILIRUB SERPL-MCNC: 0.5 MG/DL — SIGNIFICANT CHANGE UP (ref 0.2–1.2)
BUN SERPL-MCNC: 14 MG/DL — SIGNIFICANT CHANGE UP (ref 7–18)
CALCIUM SERPL-MCNC: 9.2 MG/DL — SIGNIFICANT CHANGE UP (ref 8.4–10.5)
CHLORIDE SERPL-SCNC: 108 MMOL/L — SIGNIFICANT CHANGE UP (ref 96–108)
CO2 SERPL-SCNC: 24 MMOL/L — SIGNIFICANT CHANGE UP (ref 22–31)
CREAT SERPL-MCNC: 0.98 MG/DL — SIGNIFICANT CHANGE UP (ref 0.5–1.3)
GLUCOSE BLDC GLUCOMTR-MCNC: 131 MG/DL — HIGH (ref 70–99)
GLUCOSE BLDC GLUCOMTR-MCNC: 149 MG/DL — HIGH (ref 70–99)
GLUCOSE BLDC GLUCOMTR-MCNC: 150 MG/DL — HIGH (ref 70–99)
GLUCOSE BLDC GLUCOMTR-MCNC: 153 MG/DL — HIGH (ref 70–99)
GLUCOSE SERPL-MCNC: 133 MG/DL — HIGH (ref 70–99)
HCT VFR BLD CALC: 27.5 % — LOW (ref 34.5–45)
HGB BLD-MCNC: 8.9 G/DL — LOW (ref 11.5–15.5)
MCHC RBC-ENTMCNC: 28.7 PG — SIGNIFICANT CHANGE UP (ref 27–34)
MCHC RBC-ENTMCNC: 32.4 GM/DL — SIGNIFICANT CHANGE UP (ref 32–36)
MCV RBC AUTO: 88.7 FL — SIGNIFICANT CHANGE UP (ref 80–100)
NRBC # BLD: 0 /100 WBCS — SIGNIFICANT CHANGE UP (ref 0–0)
PLATELET # BLD AUTO: 283 K/UL — SIGNIFICANT CHANGE UP (ref 150–400)
POTASSIUM SERPL-MCNC: 4.1 MMOL/L — SIGNIFICANT CHANGE UP (ref 3.5–5.3)
POTASSIUM SERPL-SCNC: 4.1 MMOL/L — SIGNIFICANT CHANGE UP (ref 3.5–5.3)
PROT SERPL-MCNC: 5.5 G/DL — LOW (ref 6–8.3)
RBC # BLD: 3.1 M/UL — LOW (ref 3.8–5.2)
RBC # FLD: 17 % — HIGH (ref 10.3–14.5)
SODIUM SERPL-SCNC: 139 MMOL/L — SIGNIFICANT CHANGE UP (ref 135–145)
WBC # BLD: 6.34 K/UL — SIGNIFICANT CHANGE UP (ref 3.8–10.5)
WBC # FLD AUTO: 6.34 K/UL — SIGNIFICANT CHANGE UP (ref 3.8–10.5)

## 2021-06-18 RX ADMIN — CARBIDOPA, LEVODOPA, AND ENTACAPONE 1 TABLET(S): 50; 200; 200 TABLET, FILM COATED ORAL at 21:41

## 2021-06-18 RX ADMIN — CARBIDOPA, LEVODOPA, AND ENTACAPONE 1 TABLET(S): 50; 200; 200 TABLET, FILM COATED ORAL at 05:47

## 2021-06-18 RX ADMIN — Medication 5 MILLIGRAM(S): at 11:18

## 2021-06-18 RX ADMIN — SENNA PLUS 2 TABLET(S): 8.6 TABLET ORAL at 21:41

## 2021-06-18 RX ADMIN — RASAGILINE 1 MILLIGRAM(S): 0.5 TABLET ORAL at 11:16

## 2021-06-18 RX ADMIN — Medication 500 MILLIGRAM(S): at 11:17

## 2021-06-18 RX ADMIN — Medication 325 MILLIGRAM(S): at 11:17

## 2021-06-18 RX ADMIN — CARBIDOPA, LEVODOPA, AND ENTACAPONE 1 TABLET(S): 50; 200; 200 TABLET, FILM COATED ORAL at 14:53

## 2021-06-18 RX ADMIN — Medication 1: at 07:54

## 2021-06-18 RX ADMIN — PANTOPRAZOLE SODIUM 40 MILLIGRAM(S): 20 TABLET, DELAYED RELEASE ORAL at 05:47

## 2021-06-18 RX ADMIN — HEPARIN SODIUM 5000 UNIT(S): 5000 INJECTION INTRAVENOUS; SUBCUTANEOUS at 14:53

## 2021-06-18 RX ADMIN — HEPARIN SODIUM 5000 UNIT(S): 5000 INJECTION INTRAVENOUS; SUBCUTANEOUS at 21:41

## 2021-06-18 RX ADMIN — HEPARIN SODIUM 5000 UNIT(S): 5000 INJECTION INTRAVENOUS; SUBCUTANEOUS at 05:47

## 2021-06-18 NOTE — PROGRESS NOTE ADULT - SUBJECTIVE AND OBJECTIVE BOX
Patient is a 75y old  Female who presents with a chief complaint of rectal bleed (17 Jun 2021 12:12)    INTERVAL HPI/OVERNIGHT EVENTS: no new complaints    REVIEW OF SYSTEMS: unable to assess due to pt's mental status  CONSTITUTIONAL: No fever, chills    T(C): 37.2 (06-18-21 @ 05:13), Max: 37.2 (06-18-21 @ 05:13)  HR: 83 (06-18-21 @ 05:13) (66 - 86)  BP: 103/56 (06-18-21 @ 05:13) (103/56 - 119/74)  RR: 18 (06-18-21 @ 05:13) (16 - 19)  SpO2: 100% (06-18-21 @ 05:13) (90% - 100%)  Wt(kg): --Vital Signs Last 24 Hrs  T(C): 37.2 (18 Jun 2021 05:13), Max: 37.2 (18 Jun 2021 05:13)  T(F): 98.9 (18 Jun 2021 05:13), Max: 98.9 (18 Jun 2021 05:13)  HR: 83 (18 Jun 2021 05:13) (66 - 86)  BP: 103/56 (18 Jun 2021 05:13) (103/56 - 119/74)  BP(mean): --  RR: 18 (18 Jun 2021 05:13) (16 - 19)  SpO2: 100% (18 Jun 2021 05:13) (90% - 100%)    PHYSICAL EXAM:  GENERAL: NAD  EYES: clear conjunctiva; EOMI  ENMT: Moist mucous membranes  NECK: Supple, No JVD, Normal thyroid  CHEST/LUNG: Clear to auscultation bilaterally; No rales, rhonchi, wheezing, or rubs  HEART: S1, S2, Regular rate and rhythm  ABDOMEN: Soft, Nontender, Nondistended; Bowel sounds present  NEURO: Alert & Oriented X1  EXTREMITIES: No LE edema, no calf tenderness  LYMPH: No lymphadenopathy noted  SKIN: No rashes or lesions    Consultant(s) Notes Reviewed:  [x ] YES  [ ] NO  Care Discussed with Consultants/Other Providers [ x] YES  [ ] NO    LABS:                        8.9    6.34  )-----------( 283      ( 18 Jun 2021 07:26 )             27.5     06-18    139  |  108  |  14  ----------------------------<  133<H>  4.1   |  24  |  0.98    Ca    9.2      18 Jun 2021 07:26    TPro  5.5<L>  /  Alb  2.4<L>  /  TBili  0.5  /  DBili  x   /  AST  15  /  ALT  8<L>  /  AlkPhos  74  06-18      CAPILLARY BLOOD GLUCOSE  POCT Blood Glucose.: 153 mg/dL (18 Jun 2021 07:51)  POCT Blood Glucose.: 160 mg/dL (17 Jun 2021 21:53)  POCT Blood Glucose.: 112 mg/dL (17 Jun 2021 17:20)  POCT Blood Glucose.: 263 mg/dL (17 Jun 2021 11:28)      RADIOLOGY & ADDITIONAL TESTS:    Imaging Personally Reviewed:  [x ] YES  [ ] NO

## 2021-06-18 NOTE — PROGRESS NOTE ADULT - PROBLEM SELECTOR PLAN 1
- PCR negative sofar, Repeat PCR 6/20  - Continue Isolation per protocol, can come off isolation 6/21  - Pt can D/c back to [facility] on 6/21  - CM/SW following

## 2021-06-18 NOTE — PROGRESS NOTE ADULT - ASSESSMENT
75 year old female from [place] with past medical history of Parkinson's Disease, COPD, diverticulosis, HTN, DM and HLD who presented to the ED for c/o rectal bleeding. CT A/P showed rectal wall thickening, colonic diverticulosis without diverticulitis, right adnexal mass, and DVT in LLE (left common femoral, femoral and deep femoral veins), and US duplex + for RLE DVT - patient refused to have the LLE examined. Patient is now s/p IVC filter placement for DVT given cannot tolerate AC's in the setting of GIB - had episode of bleeding while on heparin gtt. Colonoscopy with finding of rectal ulcer now s/p 2 clips. no active bleeding noted in colonoscopy.  Heme/onc onboard for hypercoagulable workup and adnexal mass - ultrasound with uterine fibroid. Patient now pending placement, COVID-19 PCR negative since 6/14. Pt can D/c on 6/21 back to NH

## 2021-06-18 NOTE — PROGRESS NOTE ADULT - SUBJECTIVE AND OBJECTIVE BOX
[   ] ICU                                          [   ] CCU                                      [ X  ] Medical Floor      Patient is a 75 year old with rectal bleeding. GI consulted to evaluate.      Patient is a 75 year old female from [place], with past medical history significant for Parkinson's, COPD, diverticulosis, HTN, DM and HLD, who presented to the ED due to rectal bleed. History is limited from patient due to her being a poor historian. As per ED provider note, patient was sent to the ED for concern for vaginal bleed. In ED, patient is noted to have rectal bleed instead of vaginal bleed. Patient is confused unable to provide any history. No abdominal pain, nausea, vomiting, hematemesis, melena, fever, chills, chest pain, SOB, cough, hematuria, dysuria or diarrhea reported.       Today patient appears comfortable with no new complaints. No abdominal pain, N/V, hematemesis, hematochezia, melena, fever, chills, chest pain, SOB, cough or diarrhea reported.      PAIN MANAGEMENT:  Pain Scale:                0/10  Pain Location:      Prior Colonoscopy: Unknown    PAST MEDICAL HISTORY  Parkinson disease  COPD  Diverticulosis  HTN (hypertension)  Diabetes mellitus  HLD (hyperlipidemia)      PAST SURGICAL HISTORY  No significant past surgical history      Allergies    No Known Allergies    Intolerances  None        SOCIAL HISTORY  Advanced Directives:       [ X ] Full Code       [  ] DNR  Marital Status:         [  ] M      [ X ] S      [  ] D       [  ] W  Children:       [ X ] Yes      [  ] No  Occupation:        [  ] Employed       [ X ] Unemployed       [  ] Retired  Diet:       [ X ] Regular       [  ] PEG feeding          [  ] NG tube feeding  Drug Use:           [ X ] Patient denied          [  ] Yes  Alcohol:           [ X ] No             [  ] Yes (socially)         [  ] Yes (chronic)  Tobacco:           [  ] Yes           [ X ] No      FAMILY HISTORY  [ X ] Heart Disease            [ X ] Diabetes             [ X ] HTN             [  ] Colon Cancer             [  ] Stomach Cancer              [  ] Pancreatic Cancer      VITALS  Vital Signs Last 24 Hrs  T(C): 37.2 (18 Jun 2021 05:13), Max: 37.2 (18 Jun 2021 05:13)  T(F): 98.9 (18 Jun 2021 05:13), Max: 98.9 (18 Jun 2021 05:13)  HR: 83 (18 Jun 2021 05:13) (66 - 86)  BP: 103/56 (18 Jun 2021 05:13) (103/56 - 113/50)   RR: 18 (18 Jun 2021 05:13) (18 - 19)  SpO2: 100% (18 Jun 2021 05:13) (90% - 100%)       MEDICATIONS  (STANDING):  ascorbic acid 500 milliGRAM(s) Oral daily  carbidopa/levodopa/entacapone 12.5/50/200 1 Tablet(s) Oral three times a day  enalapril 20 milliGRAM(s) Oral two times a day  ferrous    sulfate 325 milliGRAM(s) Oral daily  heparin   Injectable 5000 Unit(s) SubCutaneous every 8 hours  insulin lispro (ADMELOG) corrective regimen sliding scale   SubCutaneous Before meals and at bedtime  oxybutynin XL 5 milliGRAM(s) Oral daily  pantoprazole    Tablet 40 milliGRAM(s) Oral before breakfast  rasagiline Tablet 1 milliGRAM(s) Oral daily  senna 2 Tablet(s) Oral at bedtime    MEDICATIONS  (PRN):  acetaminophen   Tablet .. 650 milliGRAM(s) Oral every 6 hours PRN Temp greater or equal to 38C (100.4F), Moderate Pain (4 - 6)  ALBUTerol    90 MICROgram(s) HFA Inhaler 2 Puff(s) Inhalation every 6 hours PRN Shortness of Breath and/or Wheezing  ondansetron Injectable 4 milliGRAM(s) IV Push every 6 hours PRN Nausea and/or Vomiting  polyethylene glycol 3350 17 Gram(s) Oral daily PRN Constipation                            8.9    6.34  )-----------( 283      ( 18 Jun 2021 07:26 )             27.5       06-18    139  |  108  |  14  ----------------------------<  133<H>  4.1   |  24  |  0.98    Ca    9.2      18 Jun 2021 07:26    TPro  5.5<L>  /  Alb  2.4<L>  /  TBili  0.5  /  DBili  x   /  AST  15  /  ALT  8<L>  /  AlkPhos  74  06-18

## 2021-06-18 NOTE — PROGRESS NOTE ADULT - PROBLEM SELECTOR PLAN 1
- PCR negative sofar, Repeat PCR 6/20  - Continue Isolation per protocol, can come off isolation 6/21  - Pt can D/c back to [facility] on 6/21  - CM/SW following

## 2021-06-18 NOTE — PROGRESS NOTE ADULT - SUBJECTIVE AND OBJECTIVE BOX
Patient is a 75y old  Female who presents with a chief complaint of rectal bleed (17 Jun 2021 12:12)    INTERVAL HPI/OVERNIGHT EVENTS: no new complaints  MEDICATIONS  (STANDING):  ascorbic acid 500 milliGRAM(s) Oral daily  carbidopa/levodopa/entacapone 12.5/50/200 1 Tablet(s) Oral three times a day  enalapril 20 milliGRAM(s) Oral two times a day  ferrous    sulfate 325 milliGRAM(s) Oral daily  heparin   Injectable 5000 Unit(s) SubCutaneous every 8 hours  insulin lispro (ADMELOG) corrective regimen sliding scale   SubCutaneous Before meals and at bedtime  oxybutynin XL 5 milliGRAM(s) Oral daily  pantoprazole    Tablet 40 milliGRAM(s) Oral before breakfast  rasagiline Tablet 1 milliGRAM(s) Oral daily  senna 2 Tablet(s) Oral at bedtime    MEDICATIONS  (PRN):  acetaminophen   Tablet .. 650 milliGRAM(s) Oral every 6 hours PRN Temp greater or equal to 38C (100.4F), Moderate Pain (4 - 6)  ALBUTerol    90 MICROgram(s) HFA Inhaler 2 Puff(s) Inhalation every 6 hours PRN Shortness of Breath and/or Wheezing  ondansetron Injectable 4 milliGRAM(s) IV Push every 6 hours PRN Nausea and/or Vomiting  polyethylene glycol 3350 17 Gram(s) Oral daily PRN Constipation      REVIEW OF SYSTEMS: unable to assess due to pt's mental status    CONSTITUTIONAL: No fever, chills  Vital Signs Last 24 Hrs  T(C): 37.2 (18 Jun 2021 05:13), Max: 37.2 (18 Jun 2021 05:13)  T(F): 98.9 (18 Jun 2021 05:13), Max: 98.9 (18 Jun 2021 05:13)  HR: 83 (18 Jun 2021 05:13) (66 - 86)  BP: 103/56 (18 Jun 2021 05:13) (103/56 - 119/74)  BP(mean): --  RR: 18 (18 Jun 2021 05:13) (16 - 19)  SpO2: 100% (18 Jun 2021 05:13) (90% - 100%)    PHYSICAL EXAM:  GENERAL: NAD  EYES: clear conjunctiva; EOMI  ENMT: Moist mucous membranes  NECK: Supple, No JVD, Normal thyroid  CHEST/LUNG: Clear to auscultation bilaterally; No rales, rhonchi, wheezing, or rubs  HEART: S1, S2, Regular rate and rhythm  ABDOMEN: Soft, Nontender, Nondistended; Bowel sounds present  NEURO: Alert & Oriented X1  EXTREMITIES: No LE edema, no calf tenderness  LYMPH: No lymphadenopathy noted  SKIN: No rashes or lesion  LABS:                        8.9    6.34  )-----------( 283      ( 18 Jun 2021 07:26 )             27.5     06-18    139  |  108  |  14  ----------------------------<  133<H>  4.1   |  24  |  0.98    Ca    9.2      18 Jun 2021 07:26    TPro  5.5<L>  /  Alb  2.4<L>  /  TBili  0.5  /  DBili  x   /  AST  15  /  ALT  8<L>  /  AlkPhos  74  06-18                          LABS:                        8.9    6.34  )-----------( 283      ( 18 Jun 2021 07:26 )             27.5     06-18    139  |  108  |  14  ----------------------------<  133<H>  4.1   |  24  |  0.98    Ca    9.2      18 Jun 2021 07:26    TPro  5.5<L>  /  Alb  2.4<L>  /  TBili  0.5  /  DBili  x   /  AST  15  /  ALT  8<L>  /  AlkPhos  74  06-18      CAPILLARY BLOOD GLUCOSE  POCT Blood Glucose.: 153 mg/dL (18 Jun 2021 07:51)  POCT Blood Glucose.: 160 mg/dL (17 Jun 2021 21:53)  POCT Blood Glucose.: 112 mg/dL (17 Jun 2021 17:20)  POCT Blood Glucose.: 263 mg/dL (17 Jun 2021 11:28)      RADIOLOGY & ADDITIONAL TESTS:    Imaging Personally Reviewed:  [x ] YES  [ ] NO

## 2021-06-18 NOTE — PROGRESS NOTE ADULT - ASSESSMENT
75 year old female from [place] with past medical history of Parkinson's Disease, COPD, diverticulosis, HTN, DM and HLD who presented to the ED for c/o rectal bleeding. CT A/P showed rectal wall thickening, colonic diverticulosis without diverticulitis, right adnexal mass, and DVT in LLE (left common femoral, femoral and deep femoral veins), and US duplex + for RLE DVT - patient refused to have the LLE examined. Patient is now s/p IVC filter placement for DVT given cannot tolerate AC's in the setting of GIB - had episode of bleeding while on heparin gtt. Colonoscopy with finding of rectal ulcer now s/p 2 clips. no active bleeding noted in colonoscopy.  Heme/onc onboard for hypercoagulable workup and adnexal mass - ultrasound with uterine fibroid. Patient now pending placement, COVID-19 PCR negative since 6/14. Pt can D/c on 6/21 back to NH

## 2021-06-19 LAB
GLUCOSE BLDC GLUCOMTR-MCNC: 103 MG/DL — HIGH (ref 70–99)
GLUCOSE BLDC GLUCOMTR-MCNC: 125 MG/DL — HIGH (ref 70–99)
GLUCOSE BLDC GLUCOMTR-MCNC: 160 MG/DL — HIGH (ref 70–99)
GLUCOSE BLDC GLUCOMTR-MCNC: 161 MG/DL — HIGH (ref 70–99)
HCT VFR BLD CALC: 28.3 % — LOW (ref 34.5–45)
HGB BLD-MCNC: 9 G/DL — LOW (ref 11.5–15.5)
MCHC RBC-ENTMCNC: 28.3 PG — SIGNIFICANT CHANGE UP (ref 27–34)
MCHC RBC-ENTMCNC: 31.8 GM/DL — LOW (ref 32–36)
MCV RBC AUTO: 89 FL — SIGNIFICANT CHANGE UP (ref 80–100)
NRBC # BLD: 0 /100 WBCS — SIGNIFICANT CHANGE UP (ref 0–0)
PLATELET # BLD AUTO: 314 K/UL — SIGNIFICANT CHANGE UP (ref 150–400)
RBC # BLD: 3.18 M/UL — LOW (ref 3.8–5.2)
RBC # FLD: 17.2 % — HIGH (ref 10.3–14.5)
WBC # BLD: 5.74 K/UL — SIGNIFICANT CHANGE UP (ref 3.8–10.5)
WBC # FLD AUTO: 5.74 K/UL — SIGNIFICANT CHANGE UP (ref 3.8–10.5)

## 2021-06-19 RX ADMIN — Medication 1: at 17:41

## 2021-06-19 RX ADMIN — Medication 650 MILLIGRAM(S): at 21:55

## 2021-06-19 RX ADMIN — CARBIDOPA, LEVODOPA, AND ENTACAPONE 1 TABLET(S): 50; 200; 200 TABLET, FILM COATED ORAL at 15:10

## 2021-06-19 RX ADMIN — HEPARIN SODIUM 5000 UNIT(S): 5000 INJECTION INTRAVENOUS; SUBCUTANEOUS at 21:27

## 2021-06-19 RX ADMIN — Medication 650 MILLIGRAM(S): at 22:30

## 2021-06-19 RX ADMIN — HEPARIN SODIUM 5000 UNIT(S): 5000 INJECTION INTRAVENOUS; SUBCUTANEOUS at 05:49

## 2021-06-19 RX ADMIN — Medication 500 MILLIGRAM(S): at 11:59

## 2021-06-19 RX ADMIN — SENNA PLUS 2 TABLET(S): 8.6 TABLET ORAL at 21:27

## 2021-06-19 RX ADMIN — RASAGILINE 1 MILLIGRAM(S): 0.5 TABLET ORAL at 12:00

## 2021-06-19 RX ADMIN — Medication 1: at 07:55

## 2021-06-19 RX ADMIN — Medication 20 MILLIGRAM(S): at 17:42

## 2021-06-19 RX ADMIN — Medication 325 MILLIGRAM(S): at 12:00

## 2021-06-19 RX ADMIN — CARBIDOPA, LEVODOPA, AND ENTACAPONE 1 TABLET(S): 50; 200; 200 TABLET, FILM COATED ORAL at 21:27

## 2021-06-19 RX ADMIN — Medication 0: at 21:26

## 2021-06-19 RX ADMIN — PANTOPRAZOLE SODIUM 40 MILLIGRAM(S): 20 TABLET, DELAYED RELEASE ORAL at 05:49

## 2021-06-19 RX ADMIN — Medication 5 MILLIGRAM(S): at 12:00

## 2021-06-19 RX ADMIN — CARBIDOPA, LEVODOPA, AND ENTACAPONE 1 TABLET(S): 50; 200; 200 TABLET, FILM COATED ORAL at 05:49

## 2021-06-19 NOTE — PROGRESS NOTE ADULT - ASSESSMENT
75 year old female from [place] with past medical history of Parkinson's Disease, COPD, diverticulosis, HTN, DM and HLD who presented to the ED for c/o rectal bleeding. CT A/P showed rectal wall thickening, colonic diverticulosis without diverticulitis, right adnexal mass, and DVT in LLE (left common femoral, femoral and deep femoral veins), and US duplex + for RLE DVT - patient refused to have the LLE examined. Patient is now s/p IVC filter placement for DVT given cannot tolerate AC's in the setting of GIB - had episode of bleeding while on heparin gtt. Colonoscopy with finding of rectal ulcer now s/p 2 clips. no active bleeding noted in colonoscopy.  Heme/onc onboard for hypercoagulable workup and adnexal mass - ultrasound with uterine fibroid. Patient now pending placement, COVID-19 PCR negative since 6/14. Pt can D/c on 6/21 back to NH

## 2021-06-19 NOTE — PROGRESS NOTE ADULT - SUBJECTIVE AND OBJECTIVE BOX
Patient is a 75y old  Female who presents with a chief complaint of rectal bleed (17 Jun 2021 12:12)    INTERVAL HPI/OVERNIGHT EVENTS: no new complaints  MEDICATIONS  (STANDING):  ascorbic acid 500 milliGRAM(s) Oral daily  carbidopa/levodopa/entacapone 12.5/50/200 1 Tablet(s) Oral three times a day  enalapril 20 milliGRAM(s) Oral two times a day  ferrous    sulfate 325 milliGRAM(s) Oral daily  heparin   Injectable 5000 Unit(s) SubCutaneous every 8 hours  insulin lispro (ADMELOG) corrective regimen sliding scale   SubCutaneous Before meals and at bedtime  oxybutynin XL 5 milliGRAM(s) Oral daily  pantoprazole    Tablet 40 milliGRAM(s) Oral before breakfast  rasagiline Tablet 1 milliGRAM(s) Oral daily  senna 2 Tablet(s) Oral at bedtime    MEDICATIONS  (PRN):  acetaminophen   Tablet .. 650 milliGRAM(s) Oral every 6 hours PRN Temp greater or equal to 38C (100.4F), Moderate Pain (4 - 6)  ALBUTerol    90 MICROgram(s) HFA Inhaler 2 Puff(s) Inhalation every 6 hours PRN Shortness of Breath and/or Wheezing  ondansetron Injectable 4 milliGRAM(s) IV Push every 6 hours PRN Nausea and/or Vomiting  polyethylene glycol 3350 17 Gram(s) Oral daily PRN Constipation      REVIEW OF SYSTEMS: unable to assess due to pt's mental status      Vital Signs Last 24 Hrs  T(C): 36.9 (19 Jun 2021 05:27), Max: 37.2 (18 Jun 2021 21:46)  T(F): 98.5 (19 Jun 2021 05:27), Max: 98.9 (18 Jun 2021 21:46)  HR: 68 (19 Jun 2021 05:27) (68 - 75)  BP: 117/58 (19 Jun 2021 05:27) (103/49 - 117/58)  BP(mean): --  RR: 16 (19 Jun 2021 05:27) (16 - 18)  SpO2: 100% (19 Jun 2021 05:27) (99% - 100%)      PHYSICAL EXAM:  GENERAL: NAD  EYES: clear conjunctiva; EOMI  ENMT: Moist mucous membranes  NECK: Supple, No JVD, Normal thyroid  CHEST/LUNG: Clear to auscultation bilaterally; No rales, rhonchi, wheezing, or rubs  HEART: S1, S2, Regular rate and rhythm  ABDOMEN: Soft, Nontender, Nondistended; Bowel sounds present  NEURO: Alert & Oriented X1  EXTREMITIES: No LE edema, no calf tenderness  LYMPH: No lymphadenopathy noted  SKIN: No rashes or lesion    LA                    LABS:                        8.9    6.34  )-----------( 283      ( 18 Jun 2021 07:26 )             27.5     06-18    139  |  108  |  14  ----------------------------<  133<H>  4.1   |  24  |  0.98    Ca    9.2      18 Jun 2021 07:26    TPro  5.5<L>  /  Alb  2.4<L>  /  TBili  0.5  /  DBili  x   /  AST  15  /  ALT  8<L>  /  AlkPhos  74  06-18                          LABS:                        8.9    6.34  )-----------( 283      ( 18 Jun 2021 07:26 )             27.5     06-18    139  |  108  |  14  ----------------------------<  133<H>  4.1   |  24  |  0.98    Ca    9.2      18 Jun 2021 07:26    TPro  5.5<L>  /  Alb  2.4<L>  /  TBili  0.5  /  DBili  x   /  AST  15  /  ALT  8<L>  /  AlkPhos  74  06-18      CAPILLARY BLOOD GLUCOSE  POCT Blood Glucose.: 153 mg/dL (18 Jun 2021 07:51)  POCT Blood Glucose.: 160 mg/dL (17 Jun 2021 21:53)  POCT Blood Glucose.: 112 mg/dL (17 Jun 2021 17:20)  POCT Blood Glucose.: 263 mg/dL (17 Jun 2021 11:28)      RADIOLOGY & ADDITIONAL TESTS:    Imaging Personally Reviewed:  [x ] YES  [ ] NO

## 2021-06-19 NOTE — PROGRESS NOTE ADULT - PROBLEM SELECTOR PLAN 1
- PCR negative sofar, Repeat PCR 6/20  - Continue Isolation per protocol, can come off isolation 6/21  - Pt can D/c back to [facility] on 6/21  - CM/SW following

## 2021-06-19 NOTE — PROGRESS NOTE ADULT - SUBJECTIVE AND OBJECTIVE BOX
[   ] ICU                                          [   ] CCU                                      [ X  ] Medical Floor      Patient is a 75 year old with rectal bleeding. GI consulted to evaluate.      Patient is a 75 year old female from [place], with past medical history significant for Parkinson's, COPD, diverticulosis, HTN, DM and HLD, who presented to the ED due to rectal bleed. History is limited from patient due to her being a poor historian. As per ED provider note, patient was sent to the ED for concern for vaginal bleed. In ED, patient is noted to have rectal bleed instead of vaginal bleed. Patient is confused unable to provide any history. No abdominal pain, nausea, vomiting, hematemesis, melena, fever, chills, chest pain, SOB, cough, hematuria, dysuria or diarrhea reported.       Today patient appears comfortable with no new complaints. No abdominal pain, N/V, hematemesis, hematochezia, melena, fever, chills, chest pain, SOB, cough or diarrhea reported.      PAIN MANAGEMENT:  Pain Scale:                0/10  Pain Location:      Prior Colonoscopy: Unknown    PAST MEDICAL HISTORY  Parkinson disease  COPD  Diverticulosis  HTN (hypertension)  Diabetes mellitus  HLD (hyperlipidemia)      PAST SURGICAL HISTORY  No significant past surgical history      Allergies    No Known Allergies    Intolerances  None        SOCIAL HISTORY  Advanced Directives:       [ X ] Full Code       [  ] DNR  Marital Status:         [  ] M      [ X ] S      [  ] D       [  ] W  Children:       [ X ] Yes      [  ] No  Occupation:        [  ] Employed       [ X ] Unemployed       [  ] Retired  Diet:       [ X ] Regular       [  ] PEG feeding          [  ] NG tube feeding  Drug Use:           [ X ] Patient denied          [  ] Yes  Alcohol:           [ X ] No             [  ] Yes (socially)         [  ] Yes (chronic)  Tobacco:           [  ] Yes           [ X ] No      FAMILY HISTORY  [ X ] Heart Disease            [ X ] Diabetes             [ X ] HTN             [  ] Colon Cancer             [  ] Stomach Cancer              [  ] Pancreatic Cancer        VITALS  Vital Signs Last 24 Hrs  T(C): 36.9 (19 Jun 2021 05:27), Max: 37.2 (18 Jun 2021 21:46)  T(F): 98.5 (19 Jun 2021 05:27), Max: 98.9 (18 Jun 2021 21:46)  HR: 68 (19 Jun 2021 05:27) (68 - 75)  BP: 117/58 (19 Jun 2021 05:27) (103/49 - 117/58)  BP(mean): --  RR: 16 (19 Jun 2021 05:27) (16 - 18)  SpO2: 100% (19 Jun 2021 05:27) (99% - 100%)  BP:  P:  Temp:  [   ] On Ventelator    MEDICATIONS  (STANDING):  ascorbic acid 500 milliGRAM(s) Oral daily  carbidopa/levodopa/entacapone 12.5/50/200 1 Tablet(s) Oral three times a day  enalapril 20 milliGRAM(s) Oral two times a day  ferrous    sulfate 325 milliGRAM(s) Oral daily  heparin   Injectable 5000 Unit(s) SubCutaneous every 8 hours  insulin lispro (ADMELOG) corrective regimen sliding scale   SubCutaneous Before meals and at bedtime  oxybutynin XL 5 milliGRAM(s) Oral daily  pantoprazole    Tablet 40 milliGRAM(s) Oral before breakfast  rasagiline Tablet 1 milliGRAM(s) Oral daily  senna 2 Tablet(s) Oral at bedtime    MEDICATIONS  (PRN):  acetaminophen   Tablet .. 650 milliGRAM(s) Oral every 6 hours PRN Temp greater or equal to 38C (100.4F), Moderate Pain (4 - 6)  ALBUTerol    90 MICROgram(s) HFA Inhaler 2 Puff(s) Inhalation every 6 hours PRN Shortness of Breath and/or Wheezing  ondansetron Injectable 4 milliGRAM(s) IV Push every 6 hours PRN Nausea and/or Vomiting  polyethylene glycol 3350 17 Gram(s) Oral daily PRN Constipation                            8.9    6.34  )-----------( 283      ( 18 Jun 2021 07:26 )             27.5       06-18    139  |  108  |  14  ----------------------------<  133<H>  4.1   |  24  |  0.98    Ca    9.2      18 Jun 2021 07:26    TPro  5.5<L>  /  Alb  2.4<L>  /  TBili  0.5  /  DBili  x   /  AST  15  /  ALT  8<L>  /  AlkPhos  74  06-18

## 2021-06-20 LAB
ANION GAP SERPL CALC-SCNC: 4 MMOL/L — LOW (ref 5–17)
BUN SERPL-MCNC: 18 MG/DL — SIGNIFICANT CHANGE UP (ref 7–18)
CALCIUM SERPL-MCNC: 9.3 MG/DL — SIGNIFICANT CHANGE UP (ref 8.4–10.5)
CHLORIDE SERPL-SCNC: 107 MMOL/L — SIGNIFICANT CHANGE UP (ref 96–108)
CO2 SERPL-SCNC: 27 MMOL/L — SIGNIFICANT CHANGE UP (ref 22–31)
CREAT SERPL-MCNC: 0.82 MG/DL — SIGNIFICANT CHANGE UP (ref 0.5–1.3)
GLUCOSE BLDC GLUCOMTR-MCNC: 126 MG/DL — HIGH (ref 70–99)
GLUCOSE BLDC GLUCOMTR-MCNC: 127 MG/DL — HIGH (ref 70–99)
GLUCOSE BLDC GLUCOMTR-MCNC: 127 MG/DL — HIGH (ref 70–99)
GLUCOSE BLDC GLUCOMTR-MCNC: 138 MG/DL — HIGH (ref 70–99)
GLUCOSE SERPL-MCNC: 113 MG/DL — HIGH (ref 70–99)
HCT VFR BLD CALC: 28.8 % — LOW (ref 34.5–45)
HGB BLD-MCNC: 9.1 G/DL — LOW (ref 11.5–15.5)
MCHC RBC-ENTMCNC: 28.7 PG — SIGNIFICANT CHANGE UP (ref 27–34)
MCHC RBC-ENTMCNC: 31.6 GM/DL — LOW (ref 32–36)
MCV RBC AUTO: 90.9 FL — SIGNIFICANT CHANGE UP (ref 80–100)
NRBC # BLD: 0 /100 WBCS — SIGNIFICANT CHANGE UP (ref 0–0)
PLATELET # BLD AUTO: 301 K/UL — SIGNIFICANT CHANGE UP (ref 150–400)
POTASSIUM SERPL-MCNC: 4.1 MMOL/L — SIGNIFICANT CHANGE UP (ref 3.5–5.3)
POTASSIUM SERPL-SCNC: 4.1 MMOL/L — SIGNIFICANT CHANGE UP (ref 3.5–5.3)
RBC # BLD: 3.17 M/UL — LOW (ref 3.8–5.2)
RBC # FLD: 17.2 % — HIGH (ref 10.3–14.5)
SARS-COV-2 RNA SPEC QL NAA+PROBE: SIGNIFICANT CHANGE UP
SODIUM SERPL-SCNC: 138 MMOL/L — SIGNIFICANT CHANGE UP (ref 135–145)
WBC # BLD: 5.32 K/UL — SIGNIFICANT CHANGE UP (ref 3.8–10.5)
WBC # FLD AUTO: 5.32 K/UL — SIGNIFICANT CHANGE UP (ref 3.8–10.5)

## 2021-06-20 RX ADMIN — HEPARIN SODIUM 5000 UNIT(S): 5000 INJECTION INTRAVENOUS; SUBCUTANEOUS at 22:23

## 2021-06-20 RX ADMIN — Medication 20 MILLIGRAM(S): at 17:53

## 2021-06-20 RX ADMIN — PANTOPRAZOLE SODIUM 40 MILLIGRAM(S): 20 TABLET, DELAYED RELEASE ORAL at 05:50

## 2021-06-20 RX ADMIN — HEPARIN SODIUM 5000 UNIT(S): 5000 INJECTION INTRAVENOUS; SUBCUTANEOUS at 13:51

## 2021-06-20 RX ADMIN — RASAGILINE 1 MILLIGRAM(S): 0.5 TABLET ORAL at 11:35

## 2021-06-20 RX ADMIN — Medication 0: at 21:43

## 2021-06-20 RX ADMIN — Medication 20 MILLIGRAM(S): at 05:50

## 2021-06-20 RX ADMIN — CARBIDOPA, LEVODOPA, AND ENTACAPONE 1 TABLET(S): 50; 200; 200 TABLET, FILM COATED ORAL at 05:50

## 2021-06-20 RX ADMIN — Medication 325 MILLIGRAM(S): at 11:35

## 2021-06-20 RX ADMIN — CARBIDOPA, LEVODOPA, AND ENTACAPONE 1 TABLET(S): 50; 200; 200 TABLET, FILM COATED ORAL at 22:23

## 2021-06-20 RX ADMIN — SENNA PLUS 2 TABLET(S): 8.6 TABLET ORAL at 22:23

## 2021-06-20 RX ADMIN — Medication 500 MILLIGRAM(S): at 11:36

## 2021-06-20 RX ADMIN — HEPARIN SODIUM 5000 UNIT(S): 5000 INJECTION INTRAVENOUS; SUBCUTANEOUS at 05:50

## 2021-06-20 RX ADMIN — Medication 5 MILLIGRAM(S): at 11:35

## 2021-06-20 RX ADMIN — CARBIDOPA, LEVODOPA, AND ENTACAPONE 1 TABLET(S): 50; 200; 200 TABLET, FILM COATED ORAL at 13:51

## 2021-06-20 NOTE — PROGRESS NOTE ADULT - SUBJECTIVE AND OBJECTIVE BOX
[   ] ICU                                          [   ] CCU                                      [  X ] Medical Floor      Patient is a 75 year old with rectal bleeding. GI consulted to evaluate.      Patient is a 75 year old female from [place], with past medical history significant for Parkinson's, COPD, diverticulosis, HTN, DM and HLD, who presented to the ED due to rectal bleed. History is limited from patient due to her being a poor historian. As per ED provider note, patient was sent to the ED for concern for vaginal bleed. In ED, patient is noted to have rectal bleed instead of vaginal bleed. Patient is confused unable to provide any history. No abdominal pain, nausea, vomiting, hematemesis, melena, fever, chills, chest pain, SOB, cough, hematuria, dysuria or diarrhea reported.       Today patient appears comfortable with no new complaints. No abdominal pain, N/V, hematemesis, hematochezia, melena, fever, chills, chest pain, SOB, cough or diarrhea reported.      PAIN MANAGEMENT:  Pain Scale:                0/10  Pain Location:      Prior Colonoscopy: Unknown    PAST MEDICAL HISTORY  Parkinson disease  COPD  Diverticulosis  HTN (hypertension)  Diabetes mellitus  HLD (hyperlipidemia)      PAST SURGICAL HISTORY  No significant past surgical history      Allergies    No Known Allergies    Intolerances  None        SOCIAL HISTORY  Advanced Directives:       [ X ] Full Code       [  ] DNR  Marital Status:         [  ] M      [ X ] S      [  ] D       [  ] W  Children:       [ X ] Yes      [  ] No  Occupation:        [  ] Employed       [ X ] Unemployed       [  ] Retired  Diet:       [ X ] Regular       [  ] PEG feeding          [  ] NG tube feeding  Drug Use:           [ X ] Patient denied          [  ] Yes  Alcohol:           [ X ] No             [  ] Yes (socially)         [  ] Yes (chronic)  Tobacco:           [  ] Yes           [ X ] No      FAMILY HISTORY  [ X ] Heart Disease            [ X ] Diabetes             [ X ] HTN             [  ] Colon Cancer             [  ] Stomach Cancer              [  ] Pancreatic Cancer        VITALS  Vital Signs Last 24 Hrs  T(C): 37.1 (19 Jun 2021 22:10), Max: 37.1 (19 Jun 2021 22:10)  T(F): 98.7 (19 Jun 2021 22:10), Max: 98.7 (19 Jun 2021 22:10)  HR: 73 (19 Jun 2021 22:10) (73 - 87)  BP: 120/68 (19 Jun 2021 22:10) (97/56 - 120/68)   RR: 17 (19 Jun 2021 22:10) (16 - 17)  SpO2: 96% (19 Jun 2021 22:10) (92% - 98%)       MEDICATIONS  (STANDING):  ascorbic acid 500 milliGRAM(s) Oral daily  carbidopa/levodopa/entacapone 12.5/50/200 1 Tablet(s) Oral three times a day  enalapril 20 milliGRAM(s) Oral two times a day  ferrous    sulfate 325 milliGRAM(s) Oral daily  heparin   Injectable 5000 Unit(s) SubCutaneous every 8 hours  insulin lispro (ADMELOG) corrective regimen sliding scale   SubCutaneous Before meals and at bedtime  oxybutynin XL 5 milliGRAM(s) Oral daily  pantoprazole    Tablet 40 milliGRAM(s) Oral before breakfast  rasagiline Tablet 1 milliGRAM(s) Oral daily  senna 2 Tablet(s) Oral at bedtime    MEDICATIONS  (PRN):  acetaminophen   Tablet .. 650 milliGRAM(s) Oral every 6 hours PRN Temp greater or equal to 38C (100.4F), Moderate Pain (4 - 6)  ALBUTerol    90 MICROgram(s) HFA Inhaler 2 Puff(s) Inhalation every 6 hours PRN Shortness of Breath and/or Wheezing  ondansetron Injectable 4 milliGRAM(s) IV Push every 6 hours PRN Nausea and/or Vomiting  polyethylene glycol 3350 17 Gram(s) Oral daily PRN Constipation                            9.1    5.32  )-----------( 301      ( 20 Jun 2021 05:47 )             28.8       06-20    138  |  107  |  18  ----------------------------<  113<H>  4.1   |  27  |  0.82    Ca    9.3      20 Jun 2021 05:47

## 2021-06-20 NOTE — PROGRESS NOTE ADULT - PROBLEM SELECTOR PLAN 1
- PCR negative sofar, Repeat PCR 6/20  - Continue Isolation per protocol, can come off isolation 6/21  - Pt can D/c back to [facility] on 6/21  - CM/SW following

## 2021-06-20 NOTE — PROGRESS NOTE ADULT - TIME BILLING
patient will be off isolaton tomorrow   patient with above dxs waiting for ins auth to [facility]  care plan d/w np and patient's family.

## 2021-06-20 NOTE — PROGRESS NOTE ADULT - PHARYNX
no redness/no discharge
no redness/no discharge/red/nasopharyngeal drainage

## 2021-06-20 NOTE — PROGRESS NOTE ADULT - ASSESSMENT
75 year old female from [place] with past medical history of Parkinson's Disease, COPD, diverticulosis, HTN, DM and HLD who presented to the ED for c/o rectal bleeding. CT A/P showed rectal wall thickening, colonic diverticulosis without diverticulitis, right adnexal mass, and DVT in LLE (left common femoral, femoral and deep femoral veins), and US duplex + for RLE DVT - patient refused to have the LLE examined. Patient is now s/p IVC filter placement for DVT given cannot tolerate AC's in the setting of GIB - had episode of bleeding while on heparin gtt. Colonoscopy with finding of rectal ulcer now s/p 2 clips. no active bleeding noted in colonoscopy.  Heme/onc onboard for hypercoagulable workup and adnexal mass - ultrasound with uterine fibroid. Patient now pending placement, COVID-19 PCR negative since 6/14. Pt can D/c on 6/21 back to [facility]

## 2021-06-20 NOTE — PROGRESS NOTE ADULT - TONSILS
no redness/no swelling

## 2021-06-21 ENCOUNTER — TRANSCRIPTION ENCOUNTER (OUTPATIENT)
Age: 76
End: 2021-06-21

## 2021-06-21 VITALS
DIASTOLIC BLOOD PRESSURE: 65 MMHG | OXYGEN SATURATION: 100 % | RESPIRATION RATE: 17 BRPM | TEMPERATURE: 97 F | SYSTOLIC BLOOD PRESSURE: 124 MMHG | HEART RATE: 68 BPM

## 2021-06-21 LAB
ANION GAP SERPL CALC-SCNC: 6 MMOL/L — SIGNIFICANT CHANGE UP (ref 5–17)
BUN SERPL-MCNC: 18 MG/DL — SIGNIFICANT CHANGE UP (ref 7–18)
CALCIUM SERPL-MCNC: 9.8 MG/DL — SIGNIFICANT CHANGE UP (ref 8.4–10.5)
CHLORIDE SERPL-SCNC: 106 MMOL/L — SIGNIFICANT CHANGE UP (ref 96–108)
CO2 SERPL-SCNC: 25 MMOL/L — SIGNIFICANT CHANGE UP (ref 22–31)
CREAT SERPL-MCNC: 0.75 MG/DL — SIGNIFICANT CHANGE UP (ref 0.5–1.3)
GLUCOSE BLDC GLUCOMTR-MCNC: 135 MG/DL — HIGH (ref 70–99)
GLUCOSE BLDC GLUCOMTR-MCNC: 141 MG/DL — HIGH (ref 70–99)
GLUCOSE BLDC GLUCOMTR-MCNC: 148 MG/DL — HIGH (ref 70–99)
GLUCOSE SERPL-MCNC: 121 MG/DL — HIGH (ref 70–99)
HCT VFR BLD CALC: 30.2 % — LOW (ref 34.5–45)
HGB BLD-MCNC: 9.6 G/DL — LOW (ref 11.5–15.5)
MCHC RBC-ENTMCNC: 28.6 PG — SIGNIFICANT CHANGE UP (ref 27–34)
MCHC RBC-ENTMCNC: 31.8 GM/DL — LOW (ref 32–36)
MCV RBC AUTO: 89.9 FL — SIGNIFICANT CHANGE UP (ref 80–100)
NRBC # BLD: 0 /100 WBCS — SIGNIFICANT CHANGE UP (ref 0–0)
PLATELET # BLD AUTO: 308 K/UL — SIGNIFICANT CHANGE UP (ref 150–400)
POTASSIUM SERPL-MCNC: 4.3 MMOL/L — SIGNIFICANT CHANGE UP (ref 3.5–5.3)
POTASSIUM SERPL-SCNC: 4.3 MMOL/L — SIGNIFICANT CHANGE UP (ref 3.5–5.3)
RBC # BLD: 3.36 M/UL — LOW (ref 3.8–5.2)
RBC # FLD: 17 % — HIGH (ref 10.3–14.5)
SODIUM SERPL-SCNC: 137 MMOL/L — SIGNIFICANT CHANGE UP (ref 135–145)
WBC # BLD: 5.2 K/UL — SIGNIFICANT CHANGE UP (ref 3.8–10.5)
WBC # FLD AUTO: 5.2 K/UL — SIGNIFICANT CHANGE UP (ref 3.8–10.5)

## 2021-06-21 PROCEDURE — 82728 ASSAY OF FERRITIN: CPT

## 2021-06-21 PROCEDURE — 76000 FLUOROSCOPY <1 HR PHYS/QHP: CPT

## 2021-06-21 PROCEDURE — 83036 HEMOGLOBIN GLYCOSYLATED A1C: CPT

## 2021-06-21 PROCEDURE — 82607 VITAMIN B-12: CPT

## 2021-06-21 PROCEDURE — C1889: CPT

## 2021-06-21 PROCEDURE — 84100 ASSAY OF PHOSPHORUS: CPT

## 2021-06-21 PROCEDURE — 99291 CRITICAL CARE FIRST HOUR: CPT

## 2021-06-21 PROCEDURE — 85027 COMPLETE CBC AUTOMATED: CPT

## 2021-06-21 PROCEDURE — 85730 THROMBOPLASTIN TIME PARTIAL: CPT

## 2021-06-21 PROCEDURE — 82746 ASSAY OF FOLIC ACID SERUM: CPT

## 2021-06-21 PROCEDURE — 86769 SARS-COV-2 COVID-19 ANTIBODY: CPT

## 2021-06-21 PROCEDURE — 80053 COMPREHEN METABOLIC PANEL: CPT

## 2021-06-21 PROCEDURE — 81001 URINALYSIS AUTO W/SCOPE: CPT

## 2021-06-21 PROCEDURE — 83550 IRON BINDING TEST: CPT

## 2021-06-21 PROCEDURE — 76937 US GUIDE VASCULAR ACCESS: CPT

## 2021-06-21 PROCEDURE — 80048 BASIC METABOLIC PNL TOTAL CA: CPT

## 2021-06-21 PROCEDURE — 85025 COMPLETE CBC W/AUTO DIFF WBC: CPT

## 2021-06-21 PROCEDURE — 77001 FLUOROGUIDE FOR VEIN DEVICE: CPT

## 2021-06-21 PROCEDURE — 36415 COLL VENOUS BLD VENIPUNCTURE: CPT

## 2021-06-21 PROCEDURE — 83735 ASSAY OF MAGNESIUM: CPT

## 2021-06-21 PROCEDURE — 74177 CT ABD & PELVIS W/CONTRAST: CPT

## 2021-06-21 PROCEDURE — 37191 INS ENDOVAS VENA CAVA FILTR: CPT

## 2021-06-21 PROCEDURE — C1880: CPT

## 2021-06-21 PROCEDURE — 86900 BLOOD TYPING SEROLOGIC ABO: CPT

## 2021-06-21 PROCEDURE — 80061 LIPID PANEL: CPT

## 2021-06-21 PROCEDURE — 82272 OCCULT BLD FECES 1-3 TESTS: CPT

## 2021-06-21 PROCEDURE — 87635 SARS-COV-2 COVID-19 AMP PRB: CPT

## 2021-06-21 PROCEDURE — 76856 US EXAM PELVIC COMPLETE: CPT

## 2021-06-21 PROCEDURE — 86304 IMMUNOASSAY TUMOR CA 125: CPT

## 2021-06-21 PROCEDURE — 86901 BLOOD TYPING SEROLOGIC RH(D): CPT

## 2021-06-21 PROCEDURE — 85610 PROTHROMBIN TIME: CPT

## 2021-06-21 PROCEDURE — 86301 IMMUNOASSAY TUMOR CA 19-9: CPT

## 2021-06-21 PROCEDURE — 83540 ASSAY OF IRON: CPT

## 2021-06-21 PROCEDURE — 82962 GLUCOSE BLOOD TEST: CPT

## 2021-06-21 PROCEDURE — 82306 VITAMIN D 25 HYDROXY: CPT

## 2021-06-21 PROCEDURE — 86850 RBC ANTIBODY SCREEN: CPT

## 2021-06-21 PROCEDURE — 82378 CARCINOEMBRYONIC ANTIGEN: CPT

## 2021-06-21 PROCEDURE — 93970 EXTREMITY STUDY: CPT

## 2021-06-21 PROCEDURE — P9040: CPT

## 2021-06-21 PROCEDURE — 36430 TRANSFUSION BLD/BLD COMPNT: CPT

## 2021-06-21 PROCEDURE — 86923 COMPATIBILITY TEST ELECTRIC: CPT

## 2021-06-21 RX ORDER — ASCORBIC ACID 60 MG
1 TABLET,CHEWABLE ORAL
Qty: 0 | Refills: 0 | DISCHARGE
Start: 2021-06-21

## 2021-06-21 RX ORDER — FERROUS SULFATE 325(65) MG
1 TABLET ORAL
Qty: 0 | Refills: 0 | DISCHARGE
Start: 2021-06-21

## 2021-06-21 RX ADMIN — HEPARIN SODIUM 5000 UNIT(S): 5000 INJECTION INTRAVENOUS; SUBCUTANEOUS at 06:16

## 2021-06-21 RX ADMIN — HEPARIN SODIUM 5000 UNIT(S): 5000 INJECTION INTRAVENOUS; SUBCUTANEOUS at 14:48

## 2021-06-21 RX ADMIN — CARBIDOPA, LEVODOPA, AND ENTACAPONE 1 TABLET(S): 50; 200; 200 TABLET, FILM COATED ORAL at 14:48

## 2021-06-21 RX ADMIN — RASAGILINE 1 MILLIGRAM(S): 0.5 TABLET ORAL at 14:49

## 2021-06-21 RX ADMIN — Medication 5 MILLIGRAM(S): at 14:49

## 2021-06-21 RX ADMIN — CARBIDOPA, LEVODOPA, AND ENTACAPONE 1 TABLET(S): 50; 200; 200 TABLET, FILM COATED ORAL at 06:16

## 2021-06-21 RX ADMIN — PANTOPRAZOLE SODIUM 40 MILLIGRAM(S): 20 TABLET, DELAYED RELEASE ORAL at 06:16

## 2021-06-21 RX ADMIN — Medication 500 MILLIGRAM(S): at 14:49

## 2021-06-21 RX ADMIN — Medication 325 MILLIGRAM(S): at 14:49

## 2021-06-21 RX ADMIN — Medication 20 MILLIGRAM(S): at 06:16

## 2021-06-21 NOTE — PROGRESS NOTE ADULT - PROBLEM SELECTOR PLAN 8
Not in exacerbation   Continue with PRN Albuterol
-cont home med of carbidopa/levodopa/entacapone  -supportive measures
Continue with home carbidopa/levodopa/entacapone  Continue with supportive measures
Not in exacerbation   Continue with PRN Albuterol
-cont home med of carbidopa/levodopa/entacapone  -supportive measures
Continue with home carbidopa/levodopa/entacapone  Continue with supportive measures
Not in exacerbation   Continue with PRN Albuterol
Not in exacerbation   Continue with PRN Albuterol
-cont home med of carbidopa/levodopa/entacapone  -supportive measures
Continue with home carbidopa/levodopa/entacapone  Continue with supportive measures
Not in exacerbation   Continue with PRN Albuterol
Continue with home carbidopa/levodopa/entacapone  Continue with supportive measures
Continue with home carbidopa/levodopa/entacapone  Continue with supportive measures
Not in exacerbation   Continue with PRN Albuterol
Continue with home carbidopa/levodopa/entacapone  Continue with supportive measures
-cont home med of carbidopa/levodopa/entacapone  -supportive measures
-cont home med of carbidopa/levodopa/entacapone  -supportive measures
Not in exacerbation   Continue with PRN Albuterol
-cont home med of carbidopa/levodopa/entacapone  -supportive measures
Continue with home carbidopa/levodopa/entacapone  Continue with supportive measures
Not in exacerbation   Continue with PRN Albuterol
-cont home med of carbidopa/levodopa/entacapone  -supportive measures
Continue with home carbidopa/levodopa/entacapone  Continue with supportive measures
Continue with home carbidopa/levodopa/entacapone  Continue with supportive measures
Not in exacerbation   Continue with PRN Albuterol
-cont home med of carbidopa/levodopa/entacapone  -supportive measures
Not in exacerbation   Continue with PRN Albuterol
-cont home med of carbidopa/levodopa/entacapone  -supportive measures
Continue with home carbidopa/levodopa/entacapone  Continue with supportive measures

## 2021-06-21 NOTE — PROGRESS NOTE ADULT - PROBLEM SELECTOR PLAN 6
Controlled  Continue with home dose Enalapril   Continue to monitor BP
-controlled, cont home dose of Enalapril   -mon BP
- well controlled, A1C 5.7  -C/w accuchecks AC, HS  - ISS per protocol  - consistent carbohydrate diet
- well controlled, A1C 5.7  -C/w accuchecks AC, HS  - ISS per protocol  - consistent carbohydrate diet
-controlled, cont home dose of Enalapril   -monitor BP
-controlled, cont home dose of Enalapril   -mon BP
Controlled  Continue with home dose Enalapril   Continue to monitor BP
Controlled  Continue with home dose Enalapril   Continue to monitor BP
-controlled, cont home dose of Enalapril   -monitor BP
Controlled  Continue with home dose Enalapril   Continue to monitor BP
-controlled, cont home dose of Enalapril   -mon BP
- well controlled, A1C 5.7  -C/w accuchecks AC, HS  - ISS per protocol  - consistent carbohydrate diet
- well controlled, A1C 5.7  -C/w accuchecks AC, HS  - ISS per protocol  - consistent carbohydrate diet
Controlled  Continue with home dose Enalapril   Continue to monitor BP
-controlled, cont home dose of Enalapril   -mon BP
- well controlled, A1C 5.7  -C/w accuchecks AC, HS  - ISS per protocol  - consistent carbohydrate diet
Continue sliding scale insulin coverage  Continue glucose monitoring  Continue low carb diet
Controlled  Continue with home dose Enalapril   Continue to monitor BP
-controlled, cont home dose of Enalapril   -monitor BP
- well controlled, A1C 5.7  -C/w accuchecks AC, HS  - ISS per protocol  - consistent carbohydrate diet
well controlled, A1C 5.7  accuchecks AC, HS  ISS per protocol  consistent carbohydrate diet
Controlled  Continue with home dose Enalapril   Continue to monitor BP
-controlled, cont home dose of Enalapril   -monitor BP
Controlled  Continue with home dose Enalapril   Continue to monitor BP
Controlled  Continue with home dose Enalapril   Continue to monitor BP
- well controlled, A1C 5.7  -C/w accuchecks AC, HS  - ISS per protocol  - consistent carbohydrate diet
-controlled, cont home dose of Enalapril   -monitor BP
Controlled  Continue with home dose Enalapril   Continue to monitor BP
- well controlled, A1C 5.7  -C/w accuchecks AC, HS  - ISS per protocol  - consistent carbohydrate diet

## 2021-06-21 NOTE — PROGRESS NOTE ADULT - ASSESSMENT
75 year old female from [place] with past medical history of Parkinson's Disease, COPD, diverticulosis, HTN, DM and HLD who presented to the ED for c/o rectal bleeding. CT A/P showed rectal wall thickening, colonic diverticulosis without diverticulitis, right adnexal mass, and DVT in LLE (left common femoral, femoral and deep femoral veins), and US duplex + for RLE DVT - patient refused to have the LLE examined. Patient is now s/p IVC filter placement for DVT given cannot tolerate AC's in the setting of GIB - had episode of bleeding while on heparin gtt. Colonoscopy with finding of rectal ulcer now s/p 2 clips. no active bleeding noted in colonoscopy.  Heme/onc onboard for hypercoagulable workup and adnexal mass - ultrasound with uterine fibroid. Patient now pending placement, COVID-19 PCR negative since 6/14. Pt can D/c on 6/21 back to NH

## 2021-06-21 NOTE — PROGRESS NOTE ADULT - PROBLEM SELECTOR PROBLEM 3
DVT (deep venous thrombosis)
Anemia
Anemia
DVT (deep venous thrombosis)
Anemia
DVT (deep venous thrombosis)
Anemia
DVT (deep venous thrombosis)
Anemia
Anemia
DVT (deep venous thrombosis)
Anemia
DVT (deep venous thrombosis)
Anemia
DVT (deep venous thrombosis)
DVT (deep venous thrombosis)
Anemia
DVT (deep venous thrombosis)
Anemia
DVT (deep venous thrombosis)

## 2021-06-21 NOTE — PROGRESS NOTE ADULT - PROBLEM SELECTOR PROBLEM 5
Diabetes mellitus
Adnexal mass
Adnexal mass
Diabetes mellitus
Adnexal mass
Adnexal mass
Diabetes mellitus
Adnexal mass
Diabetes mellitus
Adnexal mass
Diabetes mellitus
Diabetes mellitus
Adnexal mass
Diabetes mellitus
Adnexal mass
Diabetes mellitus
Diabetes mellitus
Adnexal mass
Diabetes mellitus
Adnexal mass
Adnexal mass
Diabetes mellitus
Diabetes mellitus
Adnexal mass

## 2021-06-21 NOTE — PROGRESS NOTE ADULT - PROBLEM SELECTOR PROBLEM 9
Need for prophylactic measure
Need for prophylactic measure
Parkinson disease
Need for prophylactic measure
Parkinson disease
Parkinson disease
Need for prophylactic measure
Parkinson disease
Need for prophylactic measure
Need for prophylactic measure
Parkinson disease
Need for prophylactic measure
Parkinson disease
Need for prophylactic measure
Parkinson disease
Need for prophylactic measure
Need for prophylactic measure
Parkinson disease
Parkinson disease
Need for prophylactic measure
Need for prophylactic measure
Parkinson disease
Parkinson disease
Need for prophylactic measure
Need for prophylactic measure
Parkinson disease

## 2021-06-21 NOTE — PROGRESS NOTE ADULT - PROBLEM SELECTOR PROBLEM 10
Need for prophylactic measure
Goals of care, counseling/discussion
Goals of care, counseling/discussion
Need for prophylactic measure
Goals of care, counseling/discussion
Need for prophylactic measure
Goals of care, counseling/discussion
Need for prophylactic measure
Goals of care, counseling/discussion
Need for prophylactic measure
Goals of care, counseling/discussion
Need for prophylactic measure
Need for prophylactic measure
Goals of care, counseling/discussion
Need for prophylactic measure

## 2021-06-21 NOTE — PROGRESS NOTE ADULT - PROBLEM SELECTOR PLAN 3
HGB Stable   Transfuse for Hgb < 7  Follow up CBC in AM
HGB Stable   Transfuse for Hgb < 7  Follow up CBC in AM
had GIB on Heparin gtt, AC's contraindicated at this time  IR placed IVC filter 6/10  Dr. Yan, Heme/Onc, following
-plan as above
-plan as above
HGB Stable   Transfuse for Hgb < 7  Follow up CBC in AM  add feso4  vit c
bt as needed
bt as needed  monitor h/h
had GIB on Heparin gtt, AC's contraindicated at this time  IR placed IVC filter 6/10  Dr. Yan, Heme/Onc, following
had GIB on Heparin gtt, AC's contraindicated at this time  IR placed IVC filter 6/10  Dr. Yan, Heme/Onc, following
bt as needed  monitor h/h
Hgb 9.3  Likely secondary to GIB  Transfuse for Hgb < 7  Follow up CBC in AM
bt as needed
HGB Stable   Transfuse for Hgb < 7  Follow up CBC in AM  add feso4  vit c
HGB Stable   Transfuse for Hgb < 7  Follow up CBC in AM
Hgb 9  Likely secondary to GIB  Transfuse for Hgb < 7  Follow up CBC in AM
HGB Stable   Transfuse for Hgb < 7  Follow up CBC in AM
-plan as above
CT  noted above  IR placed IVC filter 6/10  Dr. Yan, Heme/Onc, following
Hgb 9.3  Likely secondary to GIB  Transfuse for Hgb < 7  Follow up CBC in AM
-plan as above
had GIB on Heparin gtt, AC's contraindicated at this time  IR placed IVC filter 6/10  Dr. Yan, Heme/Onc, following
had GIB on Heparin gtt, AC's contraindicated at this time  IR placed IVC filter 6/10  Dr. Yan, Heme/Onc, following
HGB Stable   Transfuse for Hgb < 7  Follow up CBC in AM
had GIB on Heparin gtt, AC's contraindicated at this time  IR placed IVC filter 6/10  Dr. Yan, Heme/Onc, following
HGB Stable   Transfuse for Hgb < 7  Follow up CBC in AM
Hgb 9.3  Likely secondary to GIB  Transfuse for Hgb < 7  Follow up CBC in AM
had GIB on Heparin gtt, AC's contraindicated at this time  IR placed IVC filter 6/10  Dr. Yan, Heme/Onc, following
-plan as above
had GIB on Heparin gtt, AC's contraindicated at this time  IR placed IVC filter 6/10  Dr. Yan, Heme/Onc, following

## 2021-06-21 NOTE — PROGRESS NOTE ADULT - TIME BILLING
patient will be off isolation   patient with above dxs waiting for ins auth to [facility]  care plan d/w np and patient's family.

## 2021-06-21 NOTE — PROGRESS NOTE ADULT - SUBJECTIVE AND OBJECTIVE BOX
[   ] ICU                                          [   ] CCU                                      [ X  ] Medical Floor    Patient is a 75 year old with rectal bleeding. GI consulted to evaluate.      Patient is a 75 year old female from [place], with past medical history significant for Parkinson's, COPD, diverticulosis, HTN, DM and HLD, who presented to the ED due to rectal bleed. History is limited from patient due to her being a poor historian. As per ED provider note, patient was sent to the ED for concern for vaginal bleed. In ED, patient is noted to have rectal bleed instead of vaginal bleed. Patient is confused unable to provide any history. No abdominal pain, nausea, vomiting, hematemesis, melena, fever, chills, chest pain, SOB, cough, hematuria, dysuria or diarrhea reported.       Today patient appears comfortable with no new complaints. No abdominal pain, N/V, hematemesis, hematochezia, melena, fever, chills, chest pain, SOB, cough or diarrhea reported.      PAIN MANAGEMENT:  Pain Scale:                0/10  Pain Location:      Prior Colonoscopy: Unknown    PAST MEDICAL HISTORY  Parkinson disease  COPD  Diverticulosis  HTN (hypertension)  Diabetes mellitus  HLD (hyperlipidemia)      PAST SURGICAL HISTORY  No significant past surgical history      Allergies    No Known Allergies    Intolerances  None        SOCIAL HISTORY  Advanced Directives:       [ X ] Full Code       [  ] DNR  Marital Status:         [  ] M      [ X ] S      [  ] D       [  ] W  Children:       [ X ] Yes      [  ] No  Occupation:        [  ] Employed       [ X ] Unemployed       [  ] Retired  Diet:       [ X ] Regular       [  ] PEG feeding          [  ] NG tube feeding  Drug Use:           [ X ] Patient denied          [  ] Yes  Alcohol:           [ X ] No             [  ] Yes (socially)         [  ] Yes (chronic)  Tobacco:           [  ] Yes           [ X ] No      FAMILY HISTORY  [ X ] Heart Disease            [ X ] Diabetes             [ X ] HTN             [  ] Colon Cancer             [  ] Stomach Cancer              [  ] Pancreatic Cancer        VITALS  Vital Signs Last 24 Hrs  T(C): 36.4 (21 Jun 2021 05:44), Max: 36.8 (20 Jun 2021 13:52)  T(F): 97.5 (21 Jun 2021 05:44), Max: 98.2 (20 Jun 2021 13:52)  HR: 67 (21 Jun 2021 05:44) (60 - 82)  BP: 149/69 (21 Jun 2021 05:44) (120/66 - 150/78)   RR: 18 (21 Jun 2021 05:44) (17 - 18)  SpO2: 96% (21 Jun 2021 05:44) (96% - 96%)       MEDICATIONS  (STANDING):  ascorbic acid 500 milliGRAM(s) Oral daily  carbidopa/levodopa/entacapone 12.5/50/200 1 Tablet(s) Oral three times a day  enalapril 20 milliGRAM(s) Oral two times a day  ferrous    sulfate 325 milliGRAM(s) Oral daily  heparin   Injectable 5000 Unit(s) SubCutaneous every 8 hours  insulin lispro (ADMELOG) corrective regimen sliding scale   SubCutaneous Before meals and at bedtime  oxybutynin XL 5 milliGRAM(s) Oral daily  pantoprazole    Tablet 40 milliGRAM(s) Oral before breakfast  rasagiline Tablet 1 milliGRAM(s) Oral daily  senna 2 Tablet(s) Oral at bedtime    MEDICATIONS  (PRN):  acetaminophen   Tablet .. 650 milliGRAM(s) Oral every 6 hours PRN Temp greater or equal to 38C (100.4F), Moderate Pain (4 - 6)  ALBUTerol    90 MICROgram(s) HFA Inhaler 2 Puff(s) Inhalation every 6 hours PRN Shortness of Breath and/or Wheezing  ondansetron Injectable 4 milliGRAM(s) IV Push every 6 hours PRN Nausea and/or Vomiting  polyethylene glycol 3350 17 Gram(s) Oral daily PRN Constipation                            9.6    5.20  )-----------( 308      ( 21 Jun 2021 06:31 )             30.2       06-21    137  |  106  |  18  ----------------------------<  121<H>  4.3   |  25  |  0.75    Ca    9.8      21 Jun 2021 06:32

## 2021-06-21 NOTE — PROGRESS NOTE ADULT - SUBJECTIVE AND OBJECTIVE BOX
Patient is a 75y old  Female who presents with a chief complaint of rectal bleed (17 Jun 2021 12:12)    INTERVAL HPI/OVERNIGHT EVENTS: no new complaints, no bleeding    MEDICATIONS  (STANDING):  ascorbic acid 500 milliGRAM(s) Oral daily  carbidopa/levodopa/entacapone 12.5/50/200 1 Tablet(s) Oral three times a day  enalapril 20 milliGRAM(s) Oral two times a day  ferrous    sulfate 325 milliGRAM(s) Oral daily  heparin   Injectable 5000 Unit(s) SubCutaneous every 8 hours  insulin lispro (ADMELOG) corrective regimen sliding scale   SubCutaneous Before meals and at bedtime  oxybutynin XL 5 milliGRAM(s) Oral daily  pantoprazole    Tablet 40 milliGRAM(s) Oral before breakfast  rasagiline Tablet 1 milliGRAM(s) Oral daily  senna 2 Tablet(s) Oral at bedtime    MEDICATIONS  (PRN):  acetaminophen   Tablet .. 650 milliGRAM(s) Oral every 6 hours PRN Temp greater or equal to 38C (100.4F), Moderate Pain (4 - 6)  ALBUTerol    90 MICROgram(s) HFA Inhaler 2 Puff(s) Inhalation every 6 hours PRN Shortness of Breath and/or Wheezing  ondansetron Injectable 4 milliGRAM(s) IV Push every 6 hours PRN Nausea and/or Vomiting  polyethylene glycol 3350 17 Gram(s) Oral daily PRN Constipation      REVIEW OF SYSTEMS: unable to assess due to pt's mental status      Vital Signs Last 24 Hrs  T(C): 36.4 (21 Jun 2021 05:44), Max: 36.8 (20 Jun 2021 13:52)  T(F): 97.5 (21 Jun 2021 05:44), Max: 98.2 (20 Jun 2021 13:52)  HR: 67 (21 Jun 2021 05:44) (60 - 82)  BP: 149/69 (21 Jun 2021 05:44) (120/66 - 150/78)  BP(mean): --  RR: 18 (21 Jun 2021 05:44) (17 - 18)  SpO2: 96% (21 Jun 2021 05:44) (96% - 96%)      PHYSICAL EXAM:  GENERAL: NAD  EYES: clear conjunctiva; EOMI  ENMT: Moist mucous membranes  NECK: Supple, No JVD, Normal thyroid  CHEST/LUNG: Clear to auscultation bilaterally; No rales, rhonchi, wheezing, or rubs  HEART: S1, S2, Regular rate and rhythm  ABDOMEN: Soft, Nontender, Nondistended; Bowel sounds present  NEURO: Alert & Oriented X1  EXTREMITIES: No LE edema, no calf tenderness  LYMPH: No lymphadenopathy noted  SKIN: No rashes or lesion  LABS:                        9.6    5.20  )-----------( 308      ( 21 Jun 2021 06:31 )             30.2     06-21    137  |  106  |  18  ----------------------------<  121<H>  4.3   |  25  |  0.75    Ca    9.8      21 Jun 2021 06:32                              LABS:                        9.1    5.32  )-----------( 301      ( 20 Jun 2021 05:47 )             28.8     06-20    138  |  107  |  18  ----------------------------<  113<H>  4.1   |  27  |  0.82    Ca    9.3      20 Jun 2021 05:47                                              LABS:                        8.9    6.34  )-----------( 283      ( 18 Jun 2021 07:26 )             27.5     06-18    139  |  108  |  14  ----------------------------<  133<H>  4.1   |  24  |  0.98    Ca    9.2      18 Jun 2021 07:26    TPro  5.5<L>  /  Alb  2.4<L>  /  TBili  0.5  /  DBili  x   /  AST  15  /  ALT  8<L>  /  AlkPhos  74  06-18                          LABS:                        8.9    6.34  )-----------( 283      ( 18 Jun 2021 07:26 )             27.5     06-18    139  |  108  |  14  ----------------------------<  133<H>  4.1   |  24  |  0.98    Ca    9.2      18 Jun 2021 07:26    TPro  5.5<L>  /  Alb  2.4<L>  /  TBili  0.5  /  DBili  x   /  AST  15  /  ALT  8<L>  /  AlkPhos  74  06-18      CAPILLARY BLOOD GLUCOSE  POCT Blood Glucose.: 153 mg/dL (18 Jun 2021 07:51)  POCT Blood Glucose.: 160 mg/dL (17 Jun 2021 21:53)  POCT Blood Glucose.: 112 mg/dL (17 Jun 2021 17:20)  POCT Blood Glucose.: 263 mg/dL (17 Jun 2021 11:28)      RADIOLOGY & ADDITIONAL TESTS:    Imaging Personally Reviewed:  [x ] YES  [ ] NO

## 2021-06-21 NOTE — PROGRESS NOTE ADULT - PROVIDER SPECIALTY LIST ADULT
Heme/Onc
Heme/Onc
Internal Medicine
Gastroenterology
Heme/Onc
Heme/Onc
Internal Medicine
Gastroenterology
Gastroenterology
Heme/Onc
Heme/Onc
Internal Medicine
Heme/Onc
Heme/Onc
Vascular Surgery
Gastroenterology
Heme/Onc
Internal Medicine
Heme/Onc
Heme/Onc
Internal Medicine
Gastroenterology
Internal Medicine
Gastroenterology
Gastroenterology
Internal Medicine
Gastroenterology
Internal Medicine
Gastroenterology
Internal Medicine
Gastroenterology
Internal Medicine
Internal Medicine
Gastroenterology
Internal Medicine
Gastroenterology
Internal Medicine
Internal Medicine
Gastroenterology
Internal Medicine
Gastroenterology
Internal Medicine
Gastroenterology
Internal Medicine

## 2021-06-21 NOTE — PROGRESS NOTE ADULT - GASTROINTESTINAL DETAILS
soft/nontender/no distention/no masses palpable/bowel sounds normal/no bruit/no rebound tenderness/no guarding/no rigidity
soft/nontender/no distention/bowel sounds normal/no bruit/no rebound tenderness/no guarding/no rigidity
soft/nontender/no distention/no masses palpable/bowel sounds normal/no bruit/no rebound tenderness/no rigidity/no organomegaly
soft/nontender/no distention/no masses palpable/bowel sounds normal/no rebound tenderness/no guarding/no rigidity
soft/nontender/no masses palpable/bowel sounds normal/no bruit/no rebound tenderness/no guarding/no rigidity
soft/nontender/no distention/no masses palpable/bowel sounds normal/no bruit/no guarding/no rigidity
soft/nontender/no distention/no masses palpable/bowel sounds normal/no bruit/no rebound tenderness/no guarding/no rigidity/no organomegaly
soft/nontender/no distention/no masses palpable/bowel sounds normal/no bruit/no rebound tenderness/no guarding/no rigidity
soft/nontender
soft/nontender/no distention/no masses palpable/bowel sounds normal/no rebound tenderness/no guarding/no rigidity
soft/nontender/no distention/no masses palpable/bowel sounds normal/no bruit/no rebound tenderness/no guarding/no rigidity
soft/nontender/no distention/no masses palpable/bowel sounds normal/no bruit/no rebound tenderness/no guarding/no rigidity/no organomegaly
soft/nontender/no distention/no masses palpable/bowel sounds normal/no bruit/no rebound tenderness/no guarding/no rigidity

## 2021-06-21 NOTE — PROGRESS NOTE ADULT - PROBLEM SELECTOR PLAN 2
CT  noted above  Hep gtt stopped secondary to rectal bleeding, Heme/Onc & GI notified  IR placing IVC filter today  Dr. Yan, Heme/Onc, following  IR following
-CT abdomen with LLE DVT, US showed Right leg DVT   -CT and US as above   -not a candidate for AC at this time given GI bleed  -IR stated that based on imaging pt is not a candidate for IVF filter with IR  -Vascular consult recalled and following   -Heme Dr. Yan
- S/p colonoscopy with rectal ulcer with 2 clips  - avoid NSAIDs  -C/w PPI  - trend CBC  - GI Leonor
CT  noted above  Hep gtt stopped secondary to rectal bleeding, Heme/Onc & GI notified  IR placing IVC filter today  Dr. Yan, Heme/Onc, following  IR following
CT noted above  Continue with patient specific Hep gtt per Heme/Onc recommendation  Pt refused further imaging  IR consulted for IVC filter placement
CT noted above  Continue with patient specific Hep gtt per Heme/Onc recommendation  Pt refused further imaging  IR consulted for IVC filter placement
CT  noted above  Hep gtt stopped secondary to rectal bleeding, Heme/Onc & GI notified  IR placing IVC filter today  Dr. Yan, Heme/Onc, following  IR following
CT  noted above  Hep gtt stopped secondary to rectal bleeding, Heme/Onc & GI notified  IR placing IVC filter today  Dr. Yan, Heme/Onc, following  IR following
-CT abdomen with LLE DVT, US showed Right leg DVT   -CT and US as above   -not a candidate for AC at this time given GI bleed  -IR stated that based on imaging pt is not a candidate for IVF filter with IR  -Vascular consult recalled and following  spoke to dr etienne and dr farias   full anticogulation   monitor h/h closely    spoke to dr salinas - vascular , no ivc filter at this point
-CT abdomen with LLE DVT, US showed Right leg DVT   -CT and US as above   -AC on hold due to GI bleed   -if sigmoidoscopy negative and cleared by GI, will start anti coagulation, may need IVC filter   -Shana Yan
- S/p colonoscopy with rectal ulcer with 2 clips  - avoid NSAIDs  -C/w PPI  - h/h stable  - GI Leonor
-CT abdomen with LLE DVT, US showed Right leg DVT   -CT and US as above   -not a candidate for AC at this time given GI bleed  -Vascular/IR  consulted for IVC filter   -Heme Dr. Yan
CT  Deep venous thrombosis within the imaged left lower extremity (left common femoral, femoral, and deep femoral veins).  Hep gtt stopped secondary to rectal bleeding, Heme/Onc notified  Pt refused further imaging  IR consulted for IVC filter placement tomorrow 6/10 NPO P MN   Dr. Yan, Heme/Onc, following
rectal ulcer s/p 2 clips  monitor H & H  avoid NSAIDs  PPI
-CT abdomen with LLE DVT, US showed Right leg DVT   -CT and US as above   -AC on hold due to GI bleed   dr farias on case   as patient apparently losing more blood , will get vascular eval for possible IVC filter   patient pulled out nt tubes      -
- S/p colonoscopy with rectal ulcer with 2 clips  - avoid NSAIDs  -C/w PPI  - trend CBC  - GI Leonor
CT noted above  Continue with patient specific Hep gtt per Heme/Onc recommendation  Pt refused further imaging  IR consulted for IVC filter placement
CT  Deep venous thrombosis within the imaged left lower extremity (left common femoral, femoral, and deep femoral veins).  Hep gtt stopped secondary to rectal bleeding, Heme/Onc notified  Pt refused further imaging  IR consulted for IVC filter placement done today  Dr. Yan, Heme/Onc, following
- S/p colonoscopy with rectal ulcer with 2 clips  - avoid NSAIDs  -C/w PPI  - trend CBC  - GI Leonor
-CT abdomen with LLE DVT, US showed Right leg DVT   -CT and US as above   -not a candidate for AC at this time given GI bleed  -IR stated that based on imaging pt is not a candidate for IVF filter with IR  -Vascular consult recalled and following   -Heme Dr. Yan
- S/p colonoscopy with rectal ulcer with 2 clips  - avoid NSAIDs  -C/w PPI  - trend CBC  - GI Leonor
-iv heparin   dose adjustment as per hematology  monitor h/h
- S/p colonoscopy with rectal ulcer with 2 clips  - avoid NSAIDs  -C/w PPI  - trend CBC  - GI Leonor
CT  noted above  Hep gtt stopped secondary to rectal bleeding, Heme/Onc & GI notified  IR placing IVC filter today  Dr. Yan, Heme/Onc, following  IR following
CT noted above  Hep gtt stopped secondary to rectal bleeding, Heme/Onc notified  Pt refused further imaging  IR consulted for IVC filter placement  Dr. Yan, Shana/Onc, following
-CT abdomen with LLE DVT, US showed Right leg DVT   -CT and US as above   -AC on hold due to GI bleed   -if sigmoidoscopy negative and cleared by GI, will start anti coagulation, may need IVC filter   -Shana Yan
Resolved  CARLOS Bhat, following
- S/p colonoscopy with rectal ulcer with 2 clips  - avoid NSAIDs  -C/w PPI  - trend CBC  - GI Leonor
-iv heparin   dose adjustment as per hematology
CT  noted above  Hep gtt stopped secondary to rectal bleeding, Heme/Onc & GI notified  IR placing IVC filter today  Dr. Yan, Heme/Onc, following  IR following
- S/p colonoscopy with rectal ulcer with 2 clips  - avoid NSAIDs  -C/w PPI  - trend CBC  - GI Leonor

## 2021-06-21 NOTE — PROGRESS NOTE ADULT - PROBLEM SELECTOR PLAN 1
- PCR negative sofar, Repeat PCR 6/20  - Continue Isolation per protocol, can come off isolation 6/21  - Pt can D/c back to [facility] on 6/21  - CM/SW following

## 2021-06-21 NOTE — PROGRESS NOTE ADULT - NSICDXPILOT_GEN_ALL_CORE
[place]

## 2021-06-21 NOTE — PROGRESS NOTE ADULT - PROBLEM SELECTOR PLAN 7
-well controlled   - C/w Enalapril with parameters  -C/w DASH diet
Not in exacerbation   Continue with PRN Albuterol
-not in exacerbation   -cont PRN Albuterol
-well controlled   - C/w Enalapril with parameters  -C/w DASH diet
-not in exacerbation   -cont PRN Albuterol
-well controlled   - C/w Enalapril with parameters  -C/w DASH diet
Not in exacerbation   Continue with PRN Albuterol
Controlled  Continue with home dose Enalapril with parameters  Continue to monitor BP
Not in exacerbation   Continue with PRN Albuterol
-well controlled   - C/w Enalapril with parameters  -C/w DASH diet
Not in exacerbation   Continue with PRN Albuterol
-not in exacerbation   -cont PRN Albuterol
BP's acceptable  Enalapril with parameters  DASH diet
-not in exacerbation   -cont PRN Albuterol
-well controlled   - C/w Enalapril with parameters  -C/w DASH diet
-not in exacerbation   -cont PRN Albuterol
Not in exacerbation   Continue with PRN Albuterol
Not in exacerbation   Continue with PRN Albuterol
-not in exacerbation   -cont PRN Albuterol
-well controlled   - C/w Enalapril with parameters  -C/w DASH diet
-not in exacerbation   -cont PRN Albuterol
Not in exacerbation   Continue with PRN Albuterol
-well controlled   - C/w Enalapril with parameters  -C/w DASH diet
Not in exacerbation   Continue with PRN Albuterol
-not in exacerbation   -cont PRN Albuterol
Not in exacerbation   Continue with PRN Albuterol
-not in exacerbation   -cont PRN Albuterol
-well controlled   - C/w Enalapril with parameters  -C/w DASH diet

## 2021-06-21 NOTE — PROGRESS NOTE ADULT - VASCULAR
Equal and normal pulses (carotid, femoral, dorsalis pedis)

## 2021-06-21 NOTE — PROGRESS NOTE ADULT - RESPIRATORY
Per LEDY okay to leave message. Left message:  negative strep test, if [place] is getting worse or no improvement follow-up with pcp. call this clinic with any questions or concern regarding results.   
detailed exam

## 2021-06-21 NOTE — PROGRESS NOTE ADULT - PROBLEM SELECTOR PROBLEM 8
Parkinson disease
History of COPD
Parkinson disease
History of COPD
Parkinson disease
History of COPD
Parkinson disease
History of COPD
Parkinson disease
History of COPD
Parkinson disease
History of COPD
Parkinson disease
Parkinson disease
History of COPD
Parkinson disease
Parkinson disease
History of COPD
Parkinson disease

## 2021-06-21 NOTE — PROGRESS NOTE ADULT - MS EXT PE MLT D E PC
no clubbing/no cyanosis

## 2021-06-21 NOTE — PROGRESS NOTE ADULT - MOUTH
normal mouth and gums/moist
normal mouth and gums/moist/swollen gums
normal mouth and gums/moist
normal mouth and gums/moist
normal mouth and gums/dry
normal mouth and gums/moist

## 2021-06-21 NOTE — PROGRESS NOTE ADULT - RS GEN PE MLT RESP DETAILS PC
airway patent/breath sounds equal/good air movement/respirations non-labored/clear to auscultation bilaterally/no rales/no rhonchi
airway patent/breath sounds equal/good air movement/respirations non-labored/no rales/no rhonchi
airway patent/breath sounds equal/good air movement/respirations non-labored/no rales/no rhonchi
airway patent/breath sounds equal/good air movement/respirations non-labored/no rales/no wheezes
airway patent/breath sounds equal/good air movement/respirations non-labored/no rales/no rhonchi
airway patent/breath sounds equal/good air movement/respirations non-labored/no rales/no rhonchi
airway patent/breath sounds equal/good air movement/respirations non-labored/clear to auscultation bilaterally/no rales/no rhonchi
airway patent/breath sounds equal/good air movement/respirations non-labored/no rales/no rhonchi
airway patent/breath sounds equal
airway patent/breath sounds equal/good air movement/respirations non-labored/no rales
airway patent/breath sounds equal/good air movement/respirations non-labored/no rales/no rhonchi
airway patent/breath sounds equal/good air movement/respirations non-labored/no rales/no rhonchi/no wheezes
airway patent/breath sounds equal/good air movement/respirations non-labored/no rales/no rhonchi

## 2021-06-21 NOTE — PROGRESS NOTE ADULT - PROBLEM SELECTOR PROBLEM 7
History of COPD
HTN (hypertension)
History of COPD
HTN (hypertension)
History of COPD
HTN (hypertension)
History of COPD
HTN (hypertension)
History of COPD
HTN (hypertension)
History of COPD
History of COPD
HTN (hypertension)
HTN (hypertension)
History of COPD
HTN (hypertension)
History of COPD
HTN (hypertension)
HTN (hypertension)
History of COPD

## 2021-06-21 NOTE — PROGRESS NOTE ADULT - PROBLEM SELECTOR PLAN 5
Continue with sliding scale insulin coverage  Continue with glucose monitoring  Continue with low carb diet
- US with uterine fibroid  - tumor markers negative  - Dr. Yan, Heme/Onc, following
Continue with sliding scale insulin coverage  Continue with glucose monitoring
-cont HSSC
-cont HSSC
US with uterine fibroid  tumor markers negative  Dr. Yan, Heme/Onc, following
CT  noted above  US noted above  Cancer marker 125: 10, GI Ca marker 19-9: <2  Dr. Yan, Heme/Onc, following
Continue with sliding scale insulin coverage  Continue with glucose monitoring
Continue with sliding scale insulin coverage  Continue with glucose monitoring  Continue with low carb diet
- US with uterine fibroid  - tumor markers negative  - Dr. Yan, Heme/Onc, following
Continue with sliding scale insulin coverage  Continue with glucose monitoring  Continue with low carb diet
Continue with sliding scale insulin coverage  Continue with glucose monitoring  Continue with low carb diet
-cont HSSC
- US with uterine fibroid  - tumor markers negative  - Dr. Yan, Heme/Onc, following
-cont HSSC
Continue with sliding scale insulin coverage  Continue with glucose monitoring
Continue with sliding scale insulin coverage  Continue with glucose monitoring
Continue with sliding scale insulin coverage  Continue with glucose monitoring  Continue with low carb diet
-cont HSSC
Continue with sliding scale insulin coverage  Continue with glucose monitoring
Continue with sliding scale insulin coverage  Continue with glucose monitoring  Continue with low carb diet
-cont HSSC
- US with uterine fibroid  - tumor markers negative  - Dr. Yan, Heme/Onc, following
- US with uterine fibroid  - tumor markers negative  - Dr. Yan, Heme/Onc, following
Continue with sliding scale insulin coverage  Continue with glucose monitoring
- US with uterine fibroid  - tumor markers negative  - Dr. Yan, Heme/Onc, following
-cont HSSC
- US with uterine fibroid  - tumor markers negative  - Dr. Yan, Heme/Onc, following

## 2021-06-21 NOTE — PROGRESS NOTE ADULT - NECK DETAILS
supple/no JVD
supple/no JVD/normal thyroid gland
supple/no JVD

## 2021-06-21 NOTE — PROGRESS NOTE ADULT - CARDIOVASCULAR DETAILS
positive S1/positive S2
positive S2

## 2021-06-21 NOTE — DISCHARGE NOTE NURSING/CASE MANAGEMENT/SOCIAL WORK - PATIENT PORTAL LINK FT
You can access the FollowMyHealth Patient Portal offered by [facility] by registering at the following website: [email or web address]. By joining [facility]s FollowMyHealth portal, you will also be able to view your health information using other applications (apps) compatible with our system.

## 2021-06-21 NOTE — PROGRESS NOTE ADULT - PROBLEM SELECTOR PROBLEM 4
Anemia
Adnexal mass
Adnexal mass
Anemia
Adnexal mass
Anemia
Anemia
Adnexal mass
Adnexal mass
Anemia
Adnexal mass
Anemia
Adnexal mass
Anemia
Anemia
Adnexal mass
Anemia
Anemia

## 2021-06-21 NOTE — PROGRESS NOTE ADULT - PROBLEM SELECTOR PROBLEM 6
Diabetes mellitus
HTN (hypertension)
HTN (hypertension)
Diabetes mellitus
Diabetes mellitus
HTN (hypertension)
Diabetes mellitus
HTN (hypertension)
Diabetes mellitus
HTN (hypertension)
Diabetes mellitus
HTN (hypertension)
HTN (hypertension)
Diabetes mellitus
Diabetes mellitus

## 2021-06-21 NOTE — PROGRESS NOTE ADULT - REASON FOR ADMISSION
rectal bleed
Colonoscopy with control bleeding
rectal bleed
Colonoscopy
rectal bleed

## 2021-06-21 NOTE — PROGRESS NOTE ADULT - PROBLEM SELECTOR PLAN 10
Hold chemical VTE prophylaxis in the setting of GIB  Continue Protonix  Continue SCDs
Hold chemical VTE prophylaxis in the setting of GIB  Continue Protonix  Continue SCDs
-pt's support person is daughter, Batool Johnson ([phone])  -DNR/DNI  -MOLST in chart
-pt's support person is daughter, Batool Johnson ([phone])  -DNR/DNI  -MOLST in chart
Hold chemical VTE prophylaxis in the setting of GIB  Continue Protonix  Continue SCDs
-pt's support person is daughter, Batool Johnson ([phone])  -DNR/DNI  -MOLST in chart
Hold chemical VTE prophylaxis in the setting of GIB  Continue Protonix  Continue SCDs
-pt's support person is daughter, Batool Johnson ([phone])  -DNR/DNI  -MOLST in chart
Hold chemical VTE prophylaxis in the setting of GIB  Continue Protonix  Continue SCDs
-pt's support person is daughter, Batool Johnson ([phone])  -DNR/DNI  -MOLST in chart
-pt's support person is daughter, Batool Johnson ([phone])  -DNR/DNI  -MOLST in chart
Hold chemical VTE prophylaxis in the setting of GIB  Continue Protonix  Continue SCDs
-pt's support person is daughter, Batool Johnson ([phone])  -DNR/DNI  -MOLST in chart
Hold chemical VTE prophylaxis in the setting of GIB  Continue Protonix  Continue SCDs
-pt's support person is daughter, Batool Johnson ([phone])  -DNR/DNI  -MOLST in chart
-pt's support person is daughter, Batool Johnson ([phone])  -DNR/DNI  -MOLST in chart
Hold chemical VTE prophylaxis in the setting of GIB  Continue Protonix  Continue SCDs

## 2021-06-21 NOTE — PROGRESS NOTE ADULT - PROBLEM SELECTOR PROBLEM 2
DVT (deep venous thrombosis)
Rectal bleeding
DVT (deep venous thrombosis)
Rectal bleeding
Rectal bleeding
DVT (deep venous thrombosis)
Rectal bleeding
DVT (deep venous thrombosis)
Rectal bleeding
Rectal bleeding
DVT (deep venous thrombosis)
DVT (deep venous thrombosis)
Rectal bleeding
DVT (deep venous thrombosis)
Rectal bleeding
DVT (deep venous thrombosis)
DVT (deep venous thrombosis)
Rectal bleeding
DVT (deep venous thrombosis)
Rectal bleeding
DVT (deep venous thrombosis)
DVT (deep venous thrombosis)
Rectal bleeding
DVT (deep venous thrombosis)
Rectal bleeding

## 2021-06-21 NOTE — PROGRESS NOTE ADULT - CONSTITUTIONAL DETAILS
well-groomed/no distress
well-groomed
well-groomed/no distress
no distress/cachectic
no distress/cachectic
well-groomed/no distress
no distress/cachectic
no distress/cachectic
well-groomed/no distress
well-groomed/no distress
no distress/cachectic
well-groomed/no distress
no distress/cachectic
well-groomed/no distress

## 2021-06-21 NOTE — PROGRESS NOTE ADULT - NOSE
no discharge/no deviation
no discharge
no discharge/no deviation

## 2021-06-21 NOTE — PROGRESS NOTE ADULT - COMMENTS
Patient is confused unable to provide any history

## 2021-06-21 NOTE — PROGRESS NOTE ADULT - CVS HE PE MLT D E PC
regular rate and rhythm/no rub
regular rate and rhythm
regular rate and rhythm/no rub

## 2021-06-21 NOTE — PROGRESS NOTE ADULT - PROBLEM SELECTOR PROBLEM 1
Rectal bleeding
Exposure to confirmed case of COVID-19
Rectal bleeding
Exposure to confirmed case of COVID-19
Exposure to confirmed case of COVID-19
Rectal bleeding
Exposure to confirmed case of COVID-19
Rectal bleeding
Exposure to confirmed case of COVID-19
Rectal bleeding
Rectal bleeding
Exposure to confirmed case of COVID-19
Rectal bleeding
Rectal bleeding
Exposure to confirmed case of COVID-19
Rectal bleeding
Exposure to confirmed case of COVID-19
Rectal bleeding

## 2021-06-23 ENCOUNTER — INPATIENT (INPATIENT)
Facility: HOSPITAL | Age: 76
LOS: 4 days | Discharge: NURSING, REHAB OR LONG-TERM CARE | DRG: 175 | End: 2021-06-28
Attending: INTERNAL MEDICINE | Admitting: INTERNAL MEDICINE
Payer: COMMERCIAL

## 2021-06-23 VITALS
HEIGHT: 64 IN | WEIGHT: 110.01 LBS | HEART RATE: 72 BPM | SYSTOLIC BLOOD PRESSURE: 107 MMHG | OXYGEN SATURATION: 97 % | TEMPERATURE: 97 F | DIASTOLIC BLOOD PRESSURE: 76 MMHG | RESPIRATION RATE: 18 BRPM

## 2021-06-23 DIAGNOSIS — J44.9 CHRONIC OBSTRUCTIVE PULMONARY DISEASE, UNSPECIFIED: ICD-10-CM

## 2021-06-23 LAB
ALBUMIN SERPL ELPH-MCNC: 3.5 G/DL — SIGNIFICANT CHANGE UP (ref 3.5–5)
ALP SERPL-CCNC: 82 U/L — SIGNIFICANT CHANGE UP (ref 40–120)
ALT FLD-CCNC: 10 U/L DA — SIGNIFICANT CHANGE UP (ref 10–60)
ANION GAP SERPL CALC-SCNC: 8 MMOL/L — SIGNIFICANT CHANGE UP (ref 5–17)
AST SERPL-CCNC: 39 U/L — SIGNIFICANT CHANGE UP (ref 10–40)
BASOPHILS # BLD AUTO: 0.04 K/UL — SIGNIFICANT CHANGE UP (ref 0–0.2)
BASOPHILS NFR BLD AUTO: 0.5 % — SIGNIFICANT CHANGE UP (ref 0–2)
BILIRUB SERPL-MCNC: 0.4 MG/DL — SIGNIFICANT CHANGE UP (ref 0.2–1.2)
BUN SERPL-MCNC: 31 MG/DL — HIGH (ref 7–18)
CALCIUM SERPL-MCNC: 10.8 MG/DL — HIGH (ref 8.4–10.5)
CHLORIDE SERPL-SCNC: 104 MMOL/L — SIGNIFICANT CHANGE UP (ref 96–108)
CO2 SERPL-SCNC: 26 MMOL/L — SIGNIFICANT CHANGE UP (ref 22–31)
CREAT SERPL-MCNC: 1.55 MG/DL — HIGH (ref 0.5–1.3)
EOSINOPHIL # BLD AUTO: 0.07 K/UL — SIGNIFICANT CHANGE UP (ref 0–0.5)
EOSINOPHIL NFR BLD AUTO: 0.8 % — SIGNIFICANT CHANGE UP (ref 0–6)
GLUCOSE SERPL-MCNC: 142 MG/DL — HIGH (ref 70–99)
HCT VFR BLD CALC: 31.8 % — LOW (ref 34.5–45)
HGB BLD-MCNC: 10.4 G/DL — LOW (ref 11.5–15.5)
IMM GRANULOCYTES NFR BLD AUTO: 1 % — SIGNIFICANT CHANGE UP (ref 0–1.5)
LYMPHOCYTES # BLD AUTO: 1.17 K/UL — SIGNIFICANT CHANGE UP (ref 1–3.3)
LYMPHOCYTES # BLD AUTO: 13.6 % — SIGNIFICANT CHANGE UP (ref 13–44)
MCHC RBC-ENTMCNC: 29.4 PG — SIGNIFICANT CHANGE UP (ref 27–34)
MCHC RBC-ENTMCNC: 32.7 GM/DL — SIGNIFICANT CHANGE UP (ref 32–36)
MCV RBC AUTO: 89.8 FL — SIGNIFICANT CHANGE UP (ref 80–100)
MONOCYTES # BLD AUTO: 0.51 K/UL — SIGNIFICANT CHANGE UP (ref 0–0.9)
MONOCYTES NFR BLD AUTO: 5.9 % — SIGNIFICANT CHANGE UP (ref 2–14)
NEUTROPHILS # BLD AUTO: 6.75 K/UL — SIGNIFICANT CHANGE UP (ref 1.8–7.4)
NEUTROPHILS NFR BLD AUTO: 78.2 % — HIGH (ref 43–77)
NRBC # BLD: 0 /100 WBCS — SIGNIFICANT CHANGE UP (ref 0–0)
NT-PROBNP SERPL-SCNC: 2799 PG/ML — HIGH (ref 0–450)
OB PNL STL: NEGATIVE — SIGNIFICANT CHANGE UP
PLATELET # BLD AUTO: 308 K/UL — SIGNIFICANT CHANGE UP (ref 150–400)
POTASSIUM SERPL-MCNC: 4.3 MMOL/L — SIGNIFICANT CHANGE UP (ref 3.5–5.3)
POTASSIUM SERPL-SCNC: 4.3 MMOL/L — SIGNIFICANT CHANGE UP (ref 3.5–5.3)
PROT SERPL-MCNC: 6.8 G/DL — SIGNIFICANT CHANGE UP (ref 6–8.3)
RBC # BLD: 3.54 M/UL — LOW (ref 3.8–5.2)
RBC # FLD: 17.4 % — HIGH (ref 10.3–14.5)
SARS-COV-2 RNA SPEC QL NAA+PROBE: SIGNIFICANT CHANGE UP
SODIUM SERPL-SCNC: 138 MMOL/L — SIGNIFICANT CHANGE UP (ref 135–145)
TROPONIN I SERPL-MCNC: 0.1 NG/ML — HIGH (ref 0–0.04)
WBC # BLD: 8.63 K/UL — SIGNIFICANT CHANGE UP (ref 3.8–10.5)
WBC # FLD AUTO: 8.63 K/UL — SIGNIFICANT CHANGE UP (ref 3.8–10.5)

## 2021-06-23 PROCEDURE — 93010 ELECTROCARDIOGRAM REPORT: CPT

## 2021-06-23 PROCEDURE — 99285 EMERGENCY DEPT VISIT HI MDM: CPT

## 2021-06-23 PROCEDURE — 71045 X-RAY EXAM CHEST 1 VIEW: CPT | Mod: 26

## 2021-06-23 RX ORDER — IPRATROPIUM/ALBUTEROL SULFATE 18-103MCG
3 AEROSOL WITH ADAPTER (GRAM) INHALATION ONCE
Refills: 0 | Status: COMPLETED | OUTPATIENT
Start: 2021-06-23 | End: 2021-06-23

## 2021-06-23 RX ORDER — CALCITRIOL 0.5 UG/1
1 CAPSULE ORAL
Qty: 0 | Refills: 0 | DISCHARGE

## 2021-06-23 RX ORDER — MONTELUKAST 4 MG/1
10 TABLET, CHEWABLE ORAL AT BEDTIME
Refills: 0 | Status: DISCONTINUED | OUTPATIENT
Start: 2021-06-23 | End: 2021-06-28

## 2021-06-23 RX ORDER — CARBIDOPA, LEVODOPA, AND ENTACAPONE 50; 200; 200 MG/1; MG/1; MG/1
1 TABLET, FILM COATED ORAL
Qty: 0 | Refills: 0 | DISCHARGE

## 2021-06-23 RX ORDER — BACITRACIN ZINC 500 UNIT/G
0 OINTMENT IN PACKET (EA) TOPICAL
Qty: 0 | Refills: 0 | DISCHARGE

## 2021-06-23 RX ORDER — RASAGILINE 0.5 MG/1
1 TABLET ORAL
Qty: 0 | Refills: 0 | DISCHARGE

## 2021-06-23 RX ORDER — ALBUTEROL 90 UG/1
2 AEROSOL, METERED ORAL EVERY 6 HOURS
Refills: 0 | Status: DISCONTINUED | OUTPATIENT
Start: 2021-06-23 | End: 2021-06-28

## 2021-06-23 RX ORDER — SENNA PLUS 8.6 MG/1
2 TABLET ORAL
Qty: 0 | Refills: 0 | DISCHARGE

## 2021-06-23 RX ORDER — ASPIRIN/CALCIUM CARB/MAGNESIUM 324 MG
325 TABLET ORAL ONCE
Refills: 0 | Status: COMPLETED | OUTPATIENT
Start: 2021-06-23 | End: 2021-06-23

## 2021-06-23 RX ORDER — OXYBUTYNIN CHLORIDE 5 MG
1 TABLET ORAL
Qty: 0 | Refills: 0 | DISCHARGE

## 2021-06-23 RX ORDER — POLYETHYLENE GLYCOL 3350 17 G/17G
1 POWDER, FOR SOLUTION ORAL
Qty: 0 | Refills: 0 | DISCHARGE

## 2021-06-23 RX ORDER — BUDESONIDE AND FORMOTEROL FUMARATE DIHYDRATE 160; 4.5 UG/1; UG/1
2 AEROSOL RESPIRATORY (INHALATION)
Refills: 0 | Status: DISCONTINUED | OUTPATIENT
Start: 2021-06-23 | End: 2021-06-28

## 2021-06-23 RX ADMIN — Medication 3 MILLILITER(S): at 19:35

## 2021-06-23 RX ADMIN — Medication 125 MILLIGRAM(S): at 19:36

## 2021-06-23 RX ADMIN — Medication 3 MILLILITER(S): at 19:36

## 2021-06-23 RX ADMIN — BUDESONIDE AND FORMOTEROL FUMARATE DIHYDRATE 2 PUFF(S): 160; 4.5 AEROSOL RESPIRATORY (INHALATION) at 22:49

## 2021-06-23 RX ADMIN — MONTELUKAST 10 MILLIGRAM(S): 4 TABLET, CHEWABLE ORAL at 22:01

## 2021-06-23 NOTE — ED ADULT NURSE REASSESSMENT NOTE - NS ED NURSE REASSESS COMMENT FT1
At this time sunny Wilcox (Primary Nurse). Pt is observed laying in bed, breathing via NC @ 2l/min, tolerating well. Pt is A&O x1-2, [place] speaking, speaks with a very low tone, appears very weak and frail. Skin intact, left AC #20GA in place.  No meds to be administered at this time.   Admitted to [facility], on box B, HR 98 at this time. Awaiting bed, endorsed to SAURAV Silver for holding. Nursing monitoring continues.

## 2021-06-23 NOTE — H&P ADULT - ASSESSMENT
Patient, a 75 year old female from [place] with past medical history of Parkinson's Disease, COPD, diverticulosis, HTN, DM, DVT and HLD is sent to the ED from NH for hypotension and hypoxia.

## 2021-06-23 NOTE — ED ADULT TRIAGE NOTE - DOMESTIC TRAVEL HIGH RISK QUESTION
12/18/2018      Michele KUMAR Walt  [address] [place] [id number]    Dear Qasim Godoy,    Your procedure is scheduled with Dr Sage Alcocer on January 2, 2019 at 2:10 pm  at:    [facility]   Orthopaedic Surgery Center - Second floor  (Please park and enter on the First Floor)  [address] [place] [id number]  [phone]    Please register at [facility] (2nd Floor Surgery Center) on January 2, 2019 by 1:10 pm .    You can expect to be contacted 1 to 3 days prior to the surgery to confirm arrival and surgery time. These times may change due to various OR schedule needs. We will call you ASAP if this happens.  PRE-PROCEDURE GENERAL GUIDELINES  · No anti-inflammatory medications(e.g. Ibuprofen, Celebrex, etc) for 48 hours before procedure. No aspirin products for 48 hours before procedure.  · Take all other medications (heart, blood pressure, diabetic, etc) as you normally would unless directed otherwise.  · If you develop an infection, illness or fever, please call before coming to appointment.  · Do not eat or drink 6 hours before your procedure.  · Have a  available for all procedures.  · Bathe or shower the evening before or morning of procedure.  · Follow up with primary care physician for all other medical concerns.    If you have questions regarding the procedure, medications, rehab, etc., please contact the nursing staff at [facility] - [phone].    If you have any scheduling questions or need to reschedule, please contact me at the telephone number and extension listed below.       Thank you,    Mendes at [phone]  Procedure  for Dr. Sage Alcocer    \"Help us grow our quality of service. We want to improve - and you can help us. You may receive a survey either in the mail or via e-mail. This is your opportunity to tell us what we did well, and where we could use some improvement. We value your input.\"    Insurance 
Authorization Need to Know’s    Prior to your surgical procedure, our team will contact your Insurance Company to initiate a PreAuthorization request.      This is not a guarantee of payment from your insurance company, but rather a step taken to ensure that we have all of the information and documentation for them to confirm the procedure is one that is eligible for coverage under your plan.    We will contact you if we either need more information from you to fulfill the requirements of your insurance company, or if we need to discuss any concerns that may lead to postponement or cancellation of your procedure. If you have any questions regarding your surgery authorization, please check with your insurance company or call our office for an update.    If you need information regarding your level of benefits or out-of-pocket expenses, please contact your insurance company directly.  They can also confirm for you whether or not we (the surgeon and the hospital/surgery center) are in your plan’s preferred network (aka ‘in-network’).    What to do if… My Insurance Changes:  If, at any time, your insurance company, plan or even card changes… Please call our office so that our team can be sure to update your records.  We will need to make sure to submit any PreAuth or moira to the correct, up-to-date insurance plan.      What to do if… My Insurance Requires A Referral:  If your insurance company requires a Referral for Specialty Care or to see a Specialist, you will need to confirm with them if you have one on file.    - If your insurance carrier does not have a referral, then you will need to contact your Primary Care Physician to have one directly submitted to your insurance company ASAP.    - Without a referral on file, your insurance company will not Pre-Authorize your surgery and may not cover any of your care with our specialty.    What to do if… I have a Workers Compensation (W/C) Claim:  If you have a W/C 
claim, please be sure to provide our reception team with the information you have regarding your claim ASAP.  We will contact your W/C carrier/adjustor to inform them of your upcoming surgery and check the status of your claim (open vs closed).  We will let you know if they advise of any concerns or issues with your claim.  - Even if you have an open W/C claim, please also provide us with your personal/family insurance.  We will want to be sure this plan is loaded into your account.  We always PreAuthorize with personal insurance as a back-up to W/C.  Otherwise, if W/C doesn’t cover something along the way, you will receive a bill for the services.    What to do if… I have Month-to-Month Coverage/Premiums:  If you have an insurance plan that is paid for month to month, or is subject to plan change on a monthly basis, please be aware we cannot initiate PreAuthorization until just before the month of your surgery, as your insurance company will need to verify your premium payments/eligibility first.    What to do if… I Do Not Have Insurance Coverage or Have other Insurance/Billing Questions:  Please call our Patient Contact Center:  [phone] to speak with a team member about your billing needs, including possible coverage options, setting up payment plans, our fee schedule, etc.  
98.4
No

## 2021-06-23 NOTE — ED PROVIDER NOTE - CLINICAL SUMMARY MEDICAL DECISION MAKING FREE TEXT BOX
Concern for COVID-19 with hypoxia. Will check labs, perform COVID-19 test, obtain chest x-ray, and will reassess patient.

## 2021-06-23 NOTE — H&P ADULT - PROBLEM SELECTOR PLAN 2
pt reportedly was hypotensive to 80/40 at NH  patient normotensive currently without getting any IVF  pt on droxidopa at NH (not available in pharamacy)  monitor BP closely   start midodrine if pt becomes hypotensive

## 2021-06-23 NOTE — H&P ADULT - PROBLEM SELECTOR PLAN 5
management as above  c/w albuterol  c/w symbicort  c/w singulair  no steroids as pt not wheezing s/p duoneb and solumedrol in ED  c/w albuterol  c/w symbicort  c/w singulair  no steroids as pt not wheezing

## 2021-06-23 NOTE — H&P ADULT - ATTENDING COMMENTS
patient with acute respiratory failiure with hypoxia, valerio, nstemi  admitted for acute hospital care   care plan as above   pulmonary , hematology eval   plus d dimer to r/o p/e   conditions guarded   care plan d/w nh rn , er attending , resident and patient's family

## 2021-06-23 NOTE — H&P ADULT - HISTORY OF PRESENT ILLNESS
Patient, a 75 year old female from [place] with past medical history of Parkinson's Disease, COPD, diverticulosis, HTN, DM, DVTand HLD is sent to the ED from NH for hypotension and hypoxia. As per NH chart, patient sent for low blood pressure of 80/40 and hypoxia to 78% 5LNC.  Patient, a 75 year old female from [place] with past medical history of Parkinson's Disease, COPD, diverticulosis, HTN, DM, DVT and HLD is sent to the ED from NH for hypotension and hypoxia. As per NH chart, patient sent for low blood pressure of 80/40 and hypoxia to 78% 5LNC. Patient is confused and unable to answer questions. As per chart, patient is alert, responsive and has periodic confusion at baseline. Patient was discharged only 2 days ago from [facility] after being admitted for rectal bleeding. At the time, CT A/P showed rectal wall thickening, colonic diverticulosis without diverticulitis, right adnexal mass, and DVT in LLE. Patient had an IVC filter placed due to inability to tolerate AC's in the setting of GIB. Colonoscopy with finding of rectal ulcer s/p 2 clips.  When seen at bedside, patient found to be desaturating to 70s on RA (had oxygen off). Patient placed on 5-6L NC saturating well.     GOC: DNR/DNI

## 2021-06-23 NOTE — ED ADULT NURSE NOTE - NSIMPLEMENTINTERV_GEN_ALL_ED
Implemented All Fall Risk Interventions:  [place] to call system. Call bell, personal items and telephone within reach. Instruct patient to call for assistance. Room bathroom lighting operational. Non-slip footwear when patient is off stretcher. Physically safe environment: no spills, clutter or unnecessary equipment. Stretcher in lowest position, wheels locked, appropriate side rails in place. Provide visual cue, wrist band, yellow gown, etc. Monitor gait and stability. Monitor for mental status changes and reorient to person, place, and time. Review medications for side effects contributing to fall risk. Reinforce activity limits and safety measures with patient and family.

## 2021-06-23 NOTE — H&P ADULT - PROBLEM SELECTOR PLAN 4
Cr 1.55 on admission-baseline normal  will get urine lytes and calculate FeNa  give gentle hydration  f/u repeat BMP in am   avoid nephrotoxins

## 2021-06-23 NOTE — ED PROVIDER NOTE - OBJECTIVE STATEMENT
75 year old female with PMHx of Parkinson's disease, HTN, HLD, DM, COPD, and a DVT (not current on anticoagulants, with an IVC filter) sent in from her nursing home for evaluation of hypotension. Of note, patient was also found to be hypoxic, and reportedly responded to administration of supplemental oxygen. In the ED, patient is noted to be a poor historian even with use of [place] , and is not answering questions. Patient states that she does not want to be bothered. NKDA.   [place] : [phone]

## 2021-06-23 NOTE — ED ADULT NURSE NOTE - ED STAT RN HANDOFF DETAILS
Pt admitted to ELE, on box B, HR 89 at this time. Awaiting bed, endorsed to SAURAV Silver for holding.

## 2021-06-23 NOTE — ED PROVIDER NOTE - CARE PLAN
Principal Discharge DX:	COPD (chronic obstructive pulmonary disease)  Secondary Diagnosis:	NSTEMI (non-ST elevated myocardial infarction)

## 2021-06-23 NOTE — H&P ADULT - PROBLEM SELECTOR PLAN 3
EKG showed no ischemic changes, looks similar to old   Trop 0.09 >0.07  monitor on tele  f/u echo  c/w aspirin, statin

## 2021-06-23 NOTE — ED PROVIDER NOTE - PMH
Diabetes mellitus    Diverticulosis    History of COPD    HLD (hyperlipidemia)    HTN (hypertension)    Parkinson disease

## 2021-06-23 NOTE — H&P ADULT - PROBLEM SELECTOR PLAN 1
pt reportedly sent by NH for hypoxia  CXR looks clear  likely from COPD exacerbation  pt currently saturating 100% on 10L NRB  will get an ABG  s/p duoneb and solumedrol in ED  c/w albuterol, symbicort  c/w singulair pt reportedly sent by NH for hypoxia  CXR looks clear  likely from COPD exacerbation  pt currently saturating 100% on 10L NRB  will get an ABG  s/p duoneb and solumedrol in ED  c/w albuterol, symbicort  c/w singulair  f/u D dimers- concern for PE given sudden onset of hypoxia  f/u V/Q scan  not given FDA due to recent GIB  Pulm Dr Trent  Heme Dr Yan pt reportedly sent by NH for hypoxia  CXR looks clear  pt currently saturating 100% on 10L NRB  concern for PE given sudden onset of hypoxia  PESI dangm100 points -class V- very high risk 10-24.5% 30 day mortality in this group  not given FDA due to recent GIB, CTA could not be done due to kidney function  f/u V/Q scan  f/u D dimers  Pulm Dr Trent  Heme Dr Yan

## 2021-06-24 DIAGNOSIS — I26.99 OTHER PULMONARY EMBOLISM WITHOUT ACUTE COR PULMONALE: ICD-10-CM

## 2021-06-24 DIAGNOSIS — Z29.9 ENCOUNTER FOR PROPHYLACTIC MEASURES, UNSPECIFIED: ICD-10-CM

## 2021-06-24 DIAGNOSIS — E11.9 TYPE 2 DIABETES MELLITUS WITHOUT COMPLICATIONS: ICD-10-CM

## 2021-06-24 DIAGNOSIS — I21.4 NON-ST ELEVATION (NSTEMI) MYOCARDIAL INFARCTION: ICD-10-CM

## 2021-06-24 DIAGNOSIS — J96.01 ACUTE RESPIRATORY FAILURE WITH HYPOXIA: ICD-10-CM

## 2021-06-24 DIAGNOSIS — I95.9 HYPOTENSION, UNSPECIFIED: ICD-10-CM

## 2021-06-24 DIAGNOSIS — G20 PARKINSON'S DISEASE: ICD-10-CM

## 2021-06-24 DIAGNOSIS — J44.9 CHRONIC OBSTRUCTIVE PULMONARY DISEASE, UNSPECIFIED: ICD-10-CM

## 2021-06-24 DIAGNOSIS — N17.9 ACUTE KIDNEY FAILURE, UNSPECIFIED: ICD-10-CM

## 2021-06-24 PROBLEM — Z87.09 PERSONAL HISTORY OF OTHER DISEASES OF THE RESPIRATORY SYSTEM: Chronic | Status: ACTIVE | Noted: 2021-06-01

## 2021-06-24 PROBLEM — E78.5 HYPERLIPIDEMIA, UNSPECIFIED: Chronic | Status: ACTIVE | Noted: 2021-06-01

## 2021-06-24 PROBLEM — I10 ESSENTIAL (PRIMARY) HYPERTENSION: Chronic | Status: ACTIVE | Noted: 2021-06-01

## 2021-06-24 PROBLEM — K57.90 DIVERTICULOSIS OF INTESTINE, PART UNSPECIFIED, WITHOUT PERFORATION OR ABSCESS WITHOUT BLEEDING: Chronic | Status: ACTIVE | Noted: 2021-06-01

## 2021-06-24 LAB
A1C WITH ESTIMATED AVERAGE GLUCOSE RESULT: 5.2 % — SIGNIFICANT CHANGE UP (ref 4–5.6)
ALBUMIN SERPL ELPH-MCNC: 3 G/DL — LOW (ref 3.5–5)
ALP SERPL-CCNC: 65 U/L — SIGNIFICANT CHANGE UP (ref 40–120)
ALT FLD-CCNC: 17 U/L DA — SIGNIFICANT CHANGE UP (ref 10–60)
ANION GAP SERPL CALC-SCNC: 7 MMOL/L — SIGNIFICANT CHANGE UP (ref 5–17)
APTT BLD: 28.9 SEC — SIGNIFICANT CHANGE UP (ref 27.5–35.5)
APTT BLD: 76 SEC — HIGH (ref 27.5–35.5)
AST SERPL-CCNC: 23 U/L — SIGNIFICANT CHANGE UP (ref 10–40)
BASE EXCESS BLDA CALC-SCNC: -0.3 MMOL/L — SIGNIFICANT CHANGE UP (ref -2–3)
BASOPHILS # BLD AUTO: 0.01 K/UL — SIGNIFICANT CHANGE UP (ref 0–0.2)
BASOPHILS NFR BLD AUTO: 0.1 % — SIGNIFICANT CHANGE UP (ref 0–2)
BILIRUB SERPL-MCNC: 0.3 MG/DL — SIGNIFICANT CHANGE UP (ref 0.2–1.2)
BLOOD GAS COMMENTS ARTERIAL: SIGNIFICANT CHANGE UP
BUN SERPL-MCNC: 34 MG/DL — HIGH (ref 7–18)
CALCIUM SERPL-MCNC: 9.9 MG/DL — SIGNIFICANT CHANGE UP (ref 8.4–10.5)
CHLORIDE SERPL-SCNC: 108 MMOL/L — SIGNIFICANT CHANGE UP (ref 96–108)
CHOLEST SERPL-MCNC: 231 MG/DL — HIGH
CO2 SERPL-SCNC: 25 MMOL/L — SIGNIFICANT CHANGE UP (ref 22–31)
COVID-19 SPIKE DOMAIN AB INTERP: POSITIVE
COVID-19 SPIKE DOMAIN ANTIBODY RESULT: >250 U/ML — HIGH
CREAT SERPL-MCNC: 1.08 MG/DL — SIGNIFICANT CHANGE UP (ref 0.5–1.3)
D DIMER BLD IA.RAPID-MCNC: 3753 NG/ML DDU — HIGH
EOSINOPHIL # BLD AUTO: 0 K/UL — SIGNIFICANT CHANGE UP (ref 0–0.5)
EOSINOPHIL NFR BLD AUTO: 0 % — SIGNIFICANT CHANGE UP (ref 0–6)
ESTIMATED AVERAGE GLUCOSE: 103 MG/DL — SIGNIFICANT CHANGE UP (ref 68–114)
GLUCOSE BLDC GLUCOMTR-MCNC: 115 MG/DL — HIGH (ref 70–99)
GLUCOSE BLDC GLUCOMTR-MCNC: 132 MG/DL — HIGH (ref 70–99)
GLUCOSE BLDC GLUCOMTR-MCNC: 166 MG/DL — HIGH (ref 70–99)
GLUCOSE BLDC GLUCOMTR-MCNC: 204 MG/DL — HIGH (ref 70–99)
GLUCOSE SERPL-MCNC: 126 MG/DL — HIGH (ref 70–99)
HCO3 BLDA-SCNC: 22 MMOL/L — SIGNIFICANT CHANGE UP (ref 21–28)
HCT VFR BLD CALC: 27.7 % — LOW (ref 34.5–45)
HCT VFR BLD CALC: 28.3 % — LOW (ref 34.5–45)
HDLC SERPL-MCNC: 50 MG/DL — LOW
HGB BLD-MCNC: 8.8 G/DL — LOW (ref 11.5–15.5)
HGB BLD-MCNC: 9.2 G/DL — LOW (ref 11.5–15.5)
HOROWITZ INDEX BLDA+IHG-RTO: 100 — SIGNIFICANT CHANGE UP
IMM GRANULOCYTES NFR BLD AUTO: 0.7 % — SIGNIFICANT CHANGE UP (ref 0–1.5)
INR BLD: 1.04 RATIO — SIGNIFICANT CHANGE UP (ref 0.88–1.16)
LIPID PNL WITH DIRECT LDL SERPL: 166 MG/DL — HIGH
LYMPHOCYTES # BLD AUTO: 0.84 K/UL — LOW (ref 1–3.3)
LYMPHOCYTES # BLD AUTO: 12.3 % — LOW (ref 13–44)
MAGNESIUM SERPL-MCNC: 1.8 MG/DL — SIGNIFICANT CHANGE UP (ref 1.6–2.6)
MCHC RBC-ENTMCNC: 28.7 PG — SIGNIFICANT CHANGE UP (ref 27–34)
MCHC RBC-ENTMCNC: 28.8 PG — SIGNIFICANT CHANGE UP (ref 27–34)
MCHC RBC-ENTMCNC: 31.8 GM/DL — LOW (ref 32–36)
MCHC RBC-ENTMCNC: 32.5 GM/DL — SIGNIFICANT CHANGE UP (ref 32–36)
MCV RBC AUTO: 88.7 FL — SIGNIFICANT CHANGE UP (ref 80–100)
MCV RBC AUTO: 90.2 FL — SIGNIFICANT CHANGE UP (ref 80–100)
MONOCYTES # BLD AUTO: 0.28 K/UL — SIGNIFICANT CHANGE UP (ref 0–0.9)
MONOCYTES NFR BLD AUTO: 4.1 % — SIGNIFICANT CHANGE UP (ref 2–14)
NEUTROPHILS # BLD AUTO: 5.66 K/UL — SIGNIFICANT CHANGE UP (ref 1.8–7.4)
NEUTROPHILS NFR BLD AUTO: 82.8 % — HIGH (ref 43–77)
NON HDL CHOLESTEROL: 181 MG/DL — HIGH
NRBC # BLD: 0 /100 WBCS — SIGNIFICANT CHANGE UP (ref 0–0)
NRBC # BLD: 0 /100 WBCS — SIGNIFICANT CHANGE UP (ref 0–0)
PCO2 BLDA: 30 MMHG — LOW (ref 32–35)
PH BLDA: 7.48 — HIGH (ref 7.35–7.45)
PHOSPHATE SERPL-MCNC: 3.4 MG/DL — SIGNIFICANT CHANGE UP (ref 2.5–4.5)
PLATELET # BLD AUTO: 299 K/UL — SIGNIFICANT CHANGE UP (ref 150–400)
PLATELET # BLD AUTO: 305 K/UL — SIGNIFICANT CHANGE UP (ref 150–400)
PO2 BLDA: 229 MMHG — HIGH (ref 83–108)
POTASSIUM SERPL-MCNC: 4 MMOL/L — SIGNIFICANT CHANGE UP (ref 3.5–5.3)
POTASSIUM SERPL-SCNC: 4 MMOL/L — SIGNIFICANT CHANGE UP (ref 3.5–5.3)
PROCALCITONIN SERPL-MCNC: 0.08 NG/ML — SIGNIFICANT CHANGE UP (ref 0.02–0.1)
PROT SERPL-MCNC: 6 G/DL — SIGNIFICANT CHANGE UP (ref 6–8.3)
PROTHROM AB SERPL-ACNC: 12.4 SEC — SIGNIFICANT CHANGE UP (ref 10.6–13.6)
RBC # BLD: 3.07 M/UL — LOW (ref 3.8–5.2)
RBC # BLD: 3.19 M/UL — LOW (ref 3.8–5.2)
RBC # FLD: 17.5 % — HIGH (ref 10.3–14.5)
RBC # FLD: 17.9 % — HIGH (ref 10.3–14.5)
SAO2 % BLDA: 99 % — SIGNIFICANT CHANGE UP
SARS-COV-2 IGG+IGM SERPL QL IA: >250 U/ML — HIGH
SARS-COV-2 IGG+IGM SERPL QL IA: POSITIVE
SODIUM SERPL-SCNC: 140 MMOL/L — SIGNIFICANT CHANGE UP (ref 135–145)
TRIGL SERPL-MCNC: 73 MG/DL — SIGNIFICANT CHANGE UP
TROPONIN I SERPL-MCNC: 0.07 NG/ML — HIGH (ref 0–0.04)
TSH SERPL-MCNC: 0.8 UU/ML — SIGNIFICANT CHANGE UP (ref 0.34–4.82)
VIT B12 SERPL-MCNC: 448 PG/ML — SIGNIFICANT CHANGE UP (ref 232–1245)
WBC # BLD: 6.84 K/UL — SIGNIFICANT CHANGE UP (ref 3.8–10.5)
WBC # BLD: 9.2 K/UL — SIGNIFICANT CHANGE UP (ref 3.8–10.5)
WBC # FLD AUTO: 6.84 K/UL — SIGNIFICANT CHANGE UP (ref 3.8–10.5)
WBC # FLD AUTO: 9.2 K/UL — SIGNIFICANT CHANGE UP (ref 3.8–10.5)

## 2021-06-24 PROCEDURE — 78580 LUNG PERFUSION IMAGING: CPT | Mod: 26

## 2021-06-24 RX ORDER — ATORVASTATIN CALCIUM 80 MG/1
40 TABLET, FILM COATED ORAL AT BEDTIME
Refills: 0 | Status: DISCONTINUED | OUTPATIENT
Start: 2021-06-24 | End: 2021-06-28

## 2021-06-24 RX ORDER — RASAGILINE 0.5 MG/1
1 TABLET ORAL DAILY
Refills: 0 | Status: DISCONTINUED | OUTPATIENT
Start: 2021-06-24 | End: 2021-06-28

## 2021-06-24 RX ORDER — HEPARIN SODIUM 5000 [USP'U]/ML
5000 INJECTION INTRAVENOUS; SUBCUTANEOUS EVERY 8 HOURS
Refills: 0 | Status: DISCONTINUED | OUTPATIENT
Start: 2021-06-24 | End: 2021-06-24

## 2021-06-24 RX ORDER — HEPARIN SODIUM 5000 [USP'U]/ML
5000 INJECTION INTRAVENOUS; SUBCUTANEOUS EVERY 12 HOURS
Refills: 0 | Status: DISCONTINUED | OUTPATIENT
Start: 2021-06-24 | End: 2021-06-24

## 2021-06-24 RX ORDER — CALCITRIOL 0.5 UG/1
0.5 CAPSULE ORAL DAILY
Refills: 0 | Status: DISCONTINUED | OUTPATIENT
Start: 2021-06-24 | End: 2021-06-28

## 2021-06-24 RX ORDER — HEPARIN SODIUM 5000 [USP'U]/ML
INJECTION INTRAVENOUS; SUBCUTANEOUS
Qty: 25000 | Refills: 0 | Status: DISCONTINUED | OUTPATIENT
Start: 2021-06-24 | End: 2021-06-25

## 2021-06-24 RX ORDER — BACITRACIN ZINC 500 UNIT/G
1 OINTMENT IN PACKET (EA) TOPICAL
Refills: 0 | Status: DISCONTINUED | OUTPATIENT
Start: 2021-06-24 | End: 2021-06-28

## 2021-06-24 RX ORDER — SENNA PLUS 8.6 MG/1
2 TABLET ORAL AT BEDTIME
Refills: 0 | Status: DISCONTINUED | OUTPATIENT
Start: 2021-06-24 | End: 2021-06-28

## 2021-06-24 RX ORDER — OXYBUTYNIN CHLORIDE 5 MG
5 TABLET ORAL DAILY
Refills: 0 | Status: DISCONTINUED | OUTPATIENT
Start: 2021-06-24 | End: 2021-06-28

## 2021-06-24 RX ORDER — SODIUM CHLORIDE 9 MG/ML
1000 INJECTION INTRAMUSCULAR; INTRAVENOUS; SUBCUTANEOUS
Refills: 0 | Status: DISCONTINUED | OUTPATIENT
Start: 2021-06-24 | End: 2021-06-28

## 2021-06-24 RX ORDER — INSULIN LISPRO 100/ML
VIAL (ML) SUBCUTANEOUS
Refills: 0 | Status: DISCONTINUED | OUTPATIENT
Start: 2021-06-24 | End: 2021-06-28

## 2021-06-24 RX ORDER — ACETAMINOPHEN 500 MG
650 TABLET ORAL EVERY 6 HOURS
Refills: 0 | Status: DISCONTINUED | OUTPATIENT
Start: 2021-06-24 | End: 2021-06-28

## 2021-06-24 RX ORDER — OXYBUTYNIN CHLORIDE 5 MG
5 TABLET ORAL DAILY
Refills: 0 | Status: DISCONTINUED | OUTPATIENT
Start: 2021-06-24 | End: 2021-06-24

## 2021-06-24 RX ORDER — ASPIRIN/CALCIUM CARB/MAGNESIUM 324 MG
81 TABLET ORAL DAILY
Refills: 0 | Status: DISCONTINUED | OUTPATIENT
Start: 2021-06-24 | End: 2021-06-28

## 2021-06-24 RX ORDER — CARBIDOPA, LEVODOPA, AND ENTACAPONE 50; 200; 200 MG/1; MG/1; MG/1
1 TABLET, FILM COATED ORAL THREE TIMES A DAY
Refills: 0 | Status: DISCONTINUED | OUTPATIENT
Start: 2021-06-24 | End: 2021-06-28

## 2021-06-24 RX ADMIN — Medication 1 APPLICATION(S): at 13:00

## 2021-06-24 RX ADMIN — Medication 1 APPLICATION(S): at 21:25

## 2021-06-24 RX ADMIN — Medication 81 MILLIGRAM(S): at 12:59

## 2021-06-24 RX ADMIN — RASAGILINE 1 MILLIGRAM(S): 0.5 TABLET ORAL at 13:01

## 2021-06-24 RX ADMIN — HEPARIN SODIUM 900 UNIT(S)/HR: 5000 INJECTION INTRAVENOUS; SUBCUTANEOUS at 21:07

## 2021-06-24 RX ADMIN — Medication 1 APPLICATION(S): at 06:17

## 2021-06-24 RX ADMIN — Medication 1 APPLICATION(S): at 17:41

## 2021-06-24 RX ADMIN — BUDESONIDE AND FORMOTEROL FUMARATE DIHYDRATE 2 PUFF(S): 160; 4.5 AEROSOL RESPIRATORY (INHALATION) at 10:00

## 2021-06-24 RX ADMIN — Medication 5 MILLIGRAM(S): at 13:00

## 2021-06-24 RX ADMIN — CARBIDOPA, LEVODOPA, AND ENTACAPONE 1 TABLET(S): 50; 200; 200 TABLET, FILM COATED ORAL at 21:24

## 2021-06-24 RX ADMIN — CARBIDOPA, LEVODOPA, AND ENTACAPONE 1 TABLET(S): 50; 200; 200 TABLET, FILM COATED ORAL at 06:18

## 2021-06-24 RX ADMIN — CALCITRIOL 0.5 MICROGRAM(S): 0.5 CAPSULE ORAL at 12:59

## 2021-06-24 RX ADMIN — CARBIDOPA, LEVODOPA, AND ENTACAPONE 1 TABLET(S): 50; 200; 200 TABLET, FILM COATED ORAL at 13:02

## 2021-06-24 RX ADMIN — SODIUM CHLORIDE 70 MILLILITER(S): 9 INJECTION INTRAMUSCULAR; INTRAVENOUS; SUBCUTANEOUS at 07:30

## 2021-06-24 RX ADMIN — BUDESONIDE AND FORMOTEROL FUMARATE DIHYDRATE 2 PUFF(S): 160; 4.5 AEROSOL RESPIRATORY (INHALATION) at 21:24

## 2021-06-24 RX ADMIN — HEPARIN SODIUM 5000 UNIT(S): 5000 INJECTION INTRAVENOUS; SUBCUTANEOUS at 06:17

## 2021-06-24 RX ADMIN — ATORVASTATIN CALCIUM 40 MILLIGRAM(S): 80 TABLET, FILM COATED ORAL at 21:24

## 2021-06-24 RX ADMIN — SENNA PLUS 2 TABLET(S): 8.6 TABLET ORAL at 21:23

## 2021-06-24 RX ADMIN — HEPARIN SODIUM 900 UNIT(S)/HR: 5000 INJECTION INTRAVENOUS; SUBCUTANEOUS at 13:37

## 2021-06-24 RX ADMIN — SODIUM CHLORIDE 70 MILLILITER(S): 9 INJECTION INTRAMUSCULAR; INTRAVENOUS; SUBCUTANEOUS at 06:55

## 2021-06-24 RX ADMIN — MONTELUKAST 10 MILLIGRAM(S): 4 TABLET, CHEWABLE ORAL at 21:24

## 2021-06-24 NOTE — PROGRESS NOTE ADULT - PROBLEM SELECTOR PLAN 2
pt reportedly was hypotensive to 80/40 at NH  patient normotensive currently without getting any IVF  pt on droxidopa at NH (not available in pharamacy)  monitor BP closely   start midodrine if pt becomes hypotensive Patient with hypoxia   CXR clear   On supplemental O2  V/Q scan + for PE  D dimer 3k  Started on Heparin drip   Pulm Dr Trent  Heme onc Dr Yan Patient with hypoxia   CXR clear   On supplemental O2  V/Q scan + for PE, D dimer 3k  Started on Heparin drip   Aspiration precautions   Pulm Dr Trent  Heme onc Dr Yan

## 2021-06-24 NOTE — PROGRESS NOTE ADULT - SUBJECTIVE AND OBJECTIVE BOX
PGY-1 Progress Note discussed with attending    PAGER #: [[phone]] TILL 5:00 PM  PLEASE CONTACT ON CALL TEAM:  - On Call Team (Please refer to Kane) FROM 5:00 PM - 8:30PM  - Nightfloat Team FROM 8:30 -7:30 AM    CHIEF COMPLAINT & BRIEF HOSPITAL COURSE:      INTERVAL HPI/OVERNIGHT EVENTS:         MEDICATIONS  (STANDING):  aspirin  chewable 81 milliGRAM(s) Oral daily  atorvastatin 40 milliGRAM(s) Oral at bedtime  BACItracin   Ointment 1 Application(s) Topical four times a day  budesonide  80 MICROgram(s)/formoterol 4.5 MICROgram(s) Inhaler 2 Puff(s) Inhalation two times a day  calcitriol   Capsule 0.5 MICROGram(s) Oral daily  carbidopa/levodopa/entacapone 12.5/50/200 1 Tablet(s) Oral three times a day  heparin   Injectable 5000 Unit(s) SubCutaneous every 8 hours  insulin lispro (ADMELOG) corrective regimen sliding scale   SubCutaneous three times a day before meals  montelukast 10 milliGRAM(s) Oral at bedtime  oxybutynin XL 5 milliGRAM(s) Oral daily  rasagiline Tablet 1 milliGRAM(s) Oral daily  senna 2 Tablet(s) Oral at bedtime  sodium chloride 0.9%. 1000 milliLiter(s) (70 mL/Hr) IV Continuous <Continuous>    MEDICATIONS  (PRN):  acetaminophen   Tablet .. 650 milliGRAM(s) Oral every 6 hours PRN Temp greater or equal to 38C (100.4F), Moderate Pain (4 - 6)  ALBUTerol    90 MICROgram(s) HFA Inhaler 2 Puff(s) Inhalation every 6 hours PRN Shortness of Breath and/or Wheezing      Vital Signs Last 24 Hrs  T(C): 36.7 (24 Jun 2021 08:00), Max: 37.2 (23 Jun 2021 22:20)  T(F): 98.1 (24 Jun 2021 08:00), Max: 98.9 (23 Jun 2021 22:20)  HR: 88 (24 Jun 2021 08:00) (72 - 88)  BP: 126/56 (24 Jun 2021 08:00) (107/76 - 137/80)  BP(mean): --  RR: 20 (24 Jun 2021 08:00) (18 - 20)  SpO2: 100% (24 Jun 2021 08:00) (96% - 100%)    PHYSICAL EXAMINATION:  GENERAL: NAD, well built  HEAD:  Atraumatic, Normocephalic  EYES:  conjunctiva and sclera clear  NECK: Supple, No JVD, Normal thyroid  CHEST/LUNG: Clear to auscultation. Clear to percussion bilaterally; No rales, rhonchi, wheezing, or rubs  HEART: Regular rate and rhythm; No murmurs, rubs, or gallops  ABDOMEN: Soft, Nontender, Nondistended; Bowel sounds present, no pain or masses on palpation  NERVOUS SYSTEM:  Alert & Oriented X3  : voiding well  EXTREMITIES:  2+ Peripheral Pulses, No clubbing, cyanosis, or edema  SKIN: warm dry                          10.4   8.63  )-----------( 308      ( 23 Jun 2021 18:10 )             31.8     06-23    138  |  104  |  31<H>  ----------------------------<  142<H>  4.3   |  26  |  1.55<H>    Ca    10.8<H>      23 Jun 2021 18:10    TPro  6.8  /  Alb  3.5  /  TBili  0.4  /  DBili  x   /  AST  39  /  ALT  10  /  AlkPhos  82  06-23    LIVER FUNCTIONS - ( 23 Jun 2021 18:10 )  Alb: 3.5 g/dL / Pro: 6.8 g/dL / ALK PHOS: 82 U/L / ALT: 10 U/L DA / AST: 39 U/L / GGT: x           CARDIAC MARKERS ( 24 Jun 2021 00:32 )  0.075 ng/mL / x     / x     / x     / x      CARDIAC MARKERS ( 23 Jun 2021 18:10 )  0.096 ng/mL / x     / x     / x     / x                             PGY-1 Progress Note discussed with attending    PAGER #: [[phone]] TILL 5:00 PM  PLEASE CONTACT ON CALL TEAM:  - On Call Team (Please refer to Kane) FROM 5:00 PM - 8:30PM  - Nightfloat Team FROM 8:30 -7:30 AM    CHIEF COMPLAINT & BRIEF HOSPITAL COURSE:  75 year old female, alert and confused at baseline, from [place] with past medical history of Parkinson's Disease, COPD, diverticulosis, HTN, DM, DVT and HLD is sent to the ED from NH for hypotension and hypoxia to 78% on 5L NC. Of note patient was recently admitted for rectal bleeding, CT A/P showed rectal wall thickening, colonic diverticulosis without diverticulitis, right adnexal mass and DVT in LLE, IVC filter placed due to inability to tolerate AC's in the setting of GIB. Colonoscopy done which showed rectal ulcer s/p 2 clips.  Patient admitted for acute hypoxic respiratory failure most likely 2/2 COPD exacerbation, CXR is clear, on supplemental oxygen via NC, Duoneb, Solumedrol IV, Albuterol and Symbicort    For HTN, holding meds as patient is low normal, For Parkinson continued to home meds Levodopa/Carbidopa/Entacapone, For GREG, on IVF gentle hydration, For, DM on HSS      INTERVAL HPI/OVERNIGHT EVENTS: patient assessed at bedside, spoke to her in Uzbek, she was c/o sob, no chest pain or other discomfort. Patient is alert to self only        MEDICATIONS  (STANDING):  aspirin  chewable 81 milliGRAM(s) Oral daily  atorvastatin 40 milliGRAM(s) Oral at bedtime  BACItracin   Ointment 1 Application(s) Topical four times a day  budesonide  80 MICROgram(s)/formoterol 4.5 MICROgram(s) Inhaler 2 Puff(s) Inhalation two times a day  calcitriol   Capsule 0.5 MICROGram(s) Oral daily  carbidopa/levodopa/entacapone 12.5/50/200 1 Tablet(s) Oral three times a day  heparin   Injectable 5000 Unit(s) SubCutaneous every 8 hours  insulin lispro (ADMELOG) corrective regimen sliding scale   SubCutaneous three times a day before meals  montelukast 10 milliGRAM(s) Oral at bedtime  oxybutynin XL 5 milliGRAM(s) Oral daily  rasagiline Tablet 1 milliGRAM(s) Oral daily  senna 2 Tablet(s) Oral at bedtime  sodium chloride 0.9%. 1000 milliLiter(s) (70 mL/Hr) IV Continuous <Continuous>    MEDICATIONS  (PRN):  acetaminophen   Tablet .. 650 milliGRAM(s) Oral every 6 hours PRN Temp greater or equal to 38C (100.4F), Moderate Pain (4 - 6)  ALBUTerol    90 MICROgram(s) HFA Inhaler 2 Puff(s) Inhalation every 6 hours PRN Shortness of Breath and/or Wheezing      Vital Signs Last 24 Hrs  T(C): 36.7 (24 Jun 2021 08:00), Max: 37.2 (23 Jun 2021 22:20)  T(F): 98.1 (24 Jun 2021 08:00), Max: 98.9 (23 Jun 2021 22:20)  HR: 88 (24 Jun 2021 08:00) (72 - 88)  BP: 126/56 (24 Jun 2021 08:00) (107/76 - 137/80)  BP(mean): --  RR: 20 (24 Jun 2021 08:00) (18 - 20)  SpO2: 100% (24 Jun 2021 08:00) (96% - 100%)    PHYSICAL EXAMINATION:  GENERAL: NAD, deconditioned, on NC, pale, poor nutrition and appetite   HEAD:  Atraumatic, Normocephalic  EYES:  conjunctiva and sclera clear  NECK: Supple, No JVD, Normal thyroid  CHEST/LUNG: Clear to auscultation. Clear to percussion bilaterally; No rales, rhonchi, wheezing, or rubs  HEART: Regular rate and rhythm; No murmurs, rubs, or gallops  ABDOMEN: Soft, Nontender, Nondistended; Bowel sounds present, no pain or masses on palpation  NERVOUS SYSTEM:  Alert & Oriented X1, unable to actively move LE, however UE with conserved strength   : incontinent   EXTREMITIES:  2+ Peripheral Pulses, No clubbing, cyanosis, or edema, contracted, scars on both knee from knee surgery    SKIN: warm dry                          10.4   8.63  )-----------( 308      ( 23 Jun 2021 18:10 )             31.8     06-23    138  |  104  |  31<H>  ----------------------------<  142<H>  4.3   |  26  |  1.55<H>    Ca    10.8<H>      23 Jun 2021 18:10    TPro  6.8  /  Alb  3.5  /  TBili  0.4  /  DBili  x   /  AST  39  /  ALT  10  /  AlkPhos  82  06-23    LIVER FUNCTIONS - ( 23 Jun 2021 18:10 )  Alb: 3.5 g/dL / Pro: 6.8 g/dL / ALK PHOS: 82 U/L / ALT: 10 U/L DA / AST: 39 U/L / GGT: x           CARDIAC MARKERS ( 24 Jun 2021 00:32 )  0.075 ng/mL / x     / x     / x     / x      CARDIAC MARKERS ( 23 Jun 2021 18:10 )  0.096 ng/mL / x     / x     / x     / x                             PGY-1 Progress Note discussed with attending    PAGER #: [[phone]] TILL 5:00 PM  PLEASE CONTACT ON CALL TEAM:  - On Call Team (Please refer to Kane) FROM 5:00 PM - 8:30PM  - Nightfloat Team FROM 8:30 -7:30 AM    CHIEF COMPLAINT & BRIEF HOSPITAL COURSE:  75 year old female, alert and confused at baseline, from [place] with past medical history of Parkinson's Disease, COPD, diverticulosis, HTN, DM, DVT and HLD is sent to the ED from NH for hypotension and hypoxia to 78% on 5L NC. Of note patient was recently admitted for rectal bleeding, CT A/P showed rectal wall thickening, colonic diverticulosis without diverticulitis, right adnexal mass and DVT in LLE, IVC filter placed due to inability to tolerate AC's in the setting of GIB. Colonoscopy done which showed rectal ulcer s/p 2 clips.  Patient admitted for acute hypoxic respiratory failure 2/2 PE, CXR is clear, VQ scan showed PE, on supplemental oxygen via NC, started on heparin drip   For COPD, on Duoneb, Albuterol, Symbicort and s/p Solumedrol IV  For HTN, holding meds as patient is low normal  For Parkinson continued to home meds Levodopa/Carbidopa/Entacapone  For GREG, on IVF gentle hydration, For, DM on HSS      INTERVAL HPI/OVERNIGHT EVENTS: patient assessed at bedside, spoke to her in Slovenian, she was c/o sob, no chest pain or other discomfort. Patient is alert to self only. Spoke to daughter Batool to inform current status and plan of care. Palliative consulted       MEDICATIONS  (STANDING):  aspirin  chewable 81 milliGRAM(s) Oral daily  atorvastatin 40 milliGRAM(s) Oral at bedtime  BACItracin   Ointment 1 Application(s) Topical four times a day  budesonide  80 MICROgram(s)/formoterol 4.5 MICROgram(s) Inhaler 2 Puff(s) Inhalation two times a day  calcitriol   Capsule 0.5 MICROGram(s) Oral daily  carbidopa/levodopa/entacapone 12.5/50/200 1 Tablet(s) Oral three times a day  heparin   Injectable 5000 Unit(s) SubCutaneous every 8 hours  insulin lispro (ADMELOG) corrective regimen sliding scale   SubCutaneous three times a day before meals  montelukast 10 milliGRAM(s) Oral at bedtime  oxybutynin XL 5 milliGRAM(s) Oral daily  rasagiline Tablet 1 milliGRAM(s) Oral daily  senna 2 Tablet(s) Oral at bedtime  sodium chloride 0.9%. 1000 milliLiter(s) (70 mL/Hr) IV Continuous <Continuous>    MEDICATIONS  (PRN):  acetaminophen   Tablet .. 650 milliGRAM(s) Oral every 6 hours PRN Temp greater or equal to 38C (100.4F), Moderate Pain (4 - 6)  ALBUTerol    90 MICROgram(s) HFA Inhaler 2 Puff(s) Inhalation every 6 hours PRN Shortness of Breath and/or Wheezing      Vital Signs Last 24 Hrs  T(C): 36.7 (24 Jun 2021 08:00), Max: 37.2 (23 Jun 2021 22:20)  T(F): 98.1 (24 Jun 2021 08:00), Max: 98.9 (23 Jun 2021 22:20)  HR: 88 (24 Jun 2021 08:00) (72 - 88)  BP: 126/56 (24 Jun 2021 08:00) (107/76 - 137/80)  BP(mean): --  RR: 20 (24 Jun 2021 08:00) (18 - 20)  SpO2: 100% (24 Jun 2021 08:00) (96% - 100%)    PHYSICAL EXAMINATION:  GENERAL: NAD, deconditioned, on NC, pale, poor nutrition and appetite   HEAD:  Atraumatic, Normocephalic  EYES:  conjunctiva and sclera clear  NECK: Supple, No JVD, Normal thyroid  CHEST/LUNG: Clear to auscultation. Clear to percussion bilaterally; No rales, rhonchi, wheezing, or rubs  HEART: Regular rate and rhythm; No murmurs, rubs, or gallops  ABDOMEN: Soft, Nontender, Nondistended; Bowel sounds present, no pain or masses on palpation  NERVOUS SYSTEM:  Alert & Oriented X1, unable to actively move LE, however UE with conserved strength   : incontinent   EXTREMITIES:  2+ Peripheral Pulses, No clubbing, cyanosis, or edema, contracted, scars on both knee from knee surgery    SKIN: warm dry                          10.4   8.63  )-----------( 308      ( 23 Jun 2021 18:10 )             31.8     06-23    138  |  104  |  31<H>  ----------------------------<  142<H>  4.3   |  26  |  1.55<H>    Ca    10.8<H>      23 Jun 2021 18:10    TPro  6.8  /  Alb  3.5  /  TBili  0.4  /  DBili  x   /  AST  39  /  ALT  10  /  AlkPhos  82  06-23    LIVER FUNCTIONS - ( 23 Jun 2021 18:10 )  Alb: 3.5 g/dL / Pro: 6.8 g/dL / ALK PHOS: 82 U/L / ALT: 10 U/L DA / AST: 39 U/L / GGT: x           CARDIAC MARKERS ( 24 Jun 2021 00:32 )  0.075 ng/mL / x     / x     / x     / x      CARDIAC MARKERS ( 23 Jun 2021 18:10 )  0.096 ng/mL / x     / x     / x     / x

## 2021-06-24 NOTE — CONSULT NOTE ADULT - ASSESSMENT
S/P Hypoxemia sec to Hypotension ? Hypovolemic  COPD HX   Parkinsonism/? Dementia  Htn with MR   NIDDM with GREG   Anemia/ S/P Rectal Bleed   DVT Rt Leg s/p IVC Filter      Plan- S/C Lovenox   CTA chest   Iv Fluids   No Diuretics   o2 n/c 2 lpm   Thanks for consult
· Assessment	  75 year old lady presented with rectal bleeding.  CT showed rectal thickening, adnexal mass, and DVT.    1. rectal bleeding. large hemorrhoid, and rectal ulcer  cauterized  no bleeding in last week    2. adnexal mass  will do transvaginal ultrasound  check ca 125  US showed fibroids    3. DVT  not on AC yet due to rectal bleeding  she has rectal bleeding again while on heparin  IVC filter placed  there is no more bleeding  but DVT still needs to be treated  give heparin 5000 q8 now    4 hypoxia and hypotension  ?PE, ?sepsis  do BC and procal  do V/Q scan

## 2021-06-24 NOTE — PROGRESS NOTE ADULT - PROBLEM SELECTOR PLAN 5
s/p duoneb and solumedrol in ED  c/w albuterol  c/w symbicort  c/w singulair  no steroids as pt not wheezing C/w with  HSS  Fingerstick before meals and at bedtime  DASH diet with consistent carb  Target -180  A1c 5.2

## 2021-06-24 NOTE — PROGRESS NOTE ADULT - PROBLEM SELECTOR PLAN 4
Cr 1.55 on admission-baseline normal  will get urine lytes and calculate FeNa  give gentle hydration  f/u repeat BMP in am   avoid nephrotoxins Patient w/ hx of COPD  S/p Duoneb and solumedrol in ED  C/w albuterol, Symbicort, singular  Not on exacerbation

## 2021-06-24 NOTE — PROGRESS NOTE ADULT - PROBLEM SELECTOR PLAN 1
pt reportedly sent by NH for hypoxia  CXR looks clear  pt currently saturating 100% on 10L NRB  concern for PE given sudden onset of hypoxia  PESI [id number] points -class V- very high risk 10-24.5% 30 day mortality in this group  not given FDA due to recent GIB, CTA could not be done due to kidney function  f/u V/Q scan  f/u D dimers  Pulm Dr Trent  Heme Dr Yan Patient presented with hypoxia and sob, hx of DVT s/p IVC filter   CXR clear   VQ scan + for PE   D dimer 3k  Started on Heparin drip

## 2021-06-24 NOTE — PROGRESS NOTE ADULT - PROBLEM SELECTOR PLAN 7
c/w home medication cabidopa/levidopa On Heparin drip - full AC On Heparin drip - full AC  Palliative consulted  Nutritionist consulted

## 2021-06-24 NOTE — PROGRESS NOTE ADULT - PROBLEM SELECTOR PLAN 3
EKG showed no ischemic changes, looks similar to old   Trop 0.09 >0.07  monitor on tele  f/u echo  c/w aspirin, statin Cr 1.55 on admission-baseline normal  Monitor BMP daily    Avoid nephrotoxins, ACEI, ARBS or NSAIDs

## 2021-06-25 ENCOUNTER — TRANSCRIPTION ENCOUNTER (OUTPATIENT)
Age: 76
End: 2021-06-25

## 2021-06-25 DIAGNOSIS — E44.0 MODERATE PROTEIN-CALORIE MALNUTRITION: ICD-10-CM

## 2021-06-25 DIAGNOSIS — Z51.5 ENCOUNTER FOR PALLIATIVE CARE: ICD-10-CM

## 2021-06-25 DIAGNOSIS — R53.2 FUNCTIONAL QUADRIPLEGIA: ICD-10-CM

## 2021-06-25 LAB
ANION GAP SERPL CALC-SCNC: 0 MMOL/L — LOW (ref 5–17)
APTT BLD: >200 SEC — CRITICAL HIGH (ref 27.5–35.5)
APTT BLD: >200 SEC — CRITICAL HIGH (ref 27.5–35.5)
BUN SERPL-MCNC: 34 MG/DL — HIGH (ref 7–18)
CALCIUM SERPL-MCNC: 9.7 MG/DL — SIGNIFICANT CHANGE UP (ref 8.4–10.5)
CHLORIDE SERPL-SCNC: 115 MMOL/L — HIGH (ref 96–108)
CO2 SERPL-SCNC: 27 MMOL/L — SIGNIFICANT CHANGE UP (ref 22–31)
CORTIS AM PEAK SERPL-MCNC: 2.9 UG/DL — LOW (ref 6–18.4)
CREAT SERPL-MCNC: 0.98 MG/DL — SIGNIFICANT CHANGE UP (ref 0.5–1.3)
GLUCOSE BLDC GLUCOMTR-MCNC: 126 MG/DL — HIGH (ref 70–99)
GLUCOSE BLDC GLUCOMTR-MCNC: 144 MG/DL — HIGH (ref 70–99)
GLUCOSE BLDC GLUCOMTR-MCNC: 148 MG/DL — HIGH (ref 70–99)
GLUCOSE BLDC GLUCOMTR-MCNC: 196 MG/DL — HIGH (ref 70–99)
GLUCOSE SERPL-MCNC: 126 MG/DL — HIGH (ref 70–99)
HCT VFR BLD CALC: 28.3 % — LOW (ref 34.5–45)
HGB BLD-MCNC: 8.8 G/DL — LOW (ref 11.5–15.5)
MAGNESIUM SERPL-MCNC: 2 MG/DL — SIGNIFICANT CHANGE UP (ref 1.6–2.6)
MCHC RBC-ENTMCNC: 28.6 PG — SIGNIFICANT CHANGE UP (ref 27–34)
MCHC RBC-ENTMCNC: 31.1 GM/DL — LOW (ref 32–36)
MCV RBC AUTO: 91.9 FL — SIGNIFICANT CHANGE UP (ref 80–100)
NRBC # BLD: 0 /100 WBCS — SIGNIFICANT CHANGE UP (ref 0–0)
PHOSPHATE SERPL-MCNC: 2.7 MG/DL — SIGNIFICANT CHANGE UP (ref 2.5–4.5)
PLATELET # BLD AUTO: 267 K/UL — SIGNIFICANT CHANGE UP (ref 150–400)
POTASSIUM SERPL-MCNC: 4.9 MMOL/L — SIGNIFICANT CHANGE UP (ref 3.5–5.3)
POTASSIUM SERPL-SCNC: 4.9 MMOL/L — SIGNIFICANT CHANGE UP (ref 3.5–5.3)
RBC # BLD: 3.08 M/UL — LOW (ref 3.8–5.2)
RBC # FLD: 18.1 % — HIGH (ref 10.3–14.5)
SODIUM SERPL-SCNC: 142 MMOL/L — SIGNIFICANT CHANGE UP (ref 135–145)
WBC # BLD: 7.41 K/UL — SIGNIFICANT CHANGE UP (ref 3.8–10.5)
WBC # FLD AUTO: 7.41 K/UL — SIGNIFICANT CHANGE UP (ref 3.8–10.5)

## 2021-06-25 PROCEDURE — 99223 1ST HOSP IP/OBS HIGH 75: CPT

## 2021-06-25 RX ORDER — APIXABAN 2.5 MG/1
10 TABLET, FILM COATED ORAL ONCE
Refills: 0 | Status: COMPLETED | OUTPATIENT
Start: 2021-06-25 | End: 2021-06-25

## 2021-06-25 RX ORDER — APIXABAN 2.5 MG/1
10 TABLET, FILM COATED ORAL
Refills: 0 | Status: DISCONTINUED | OUTPATIENT
Start: 2021-06-25 | End: 2021-06-25

## 2021-06-25 RX ORDER — APIXABAN 2.5 MG/1
10 TABLET, FILM COATED ORAL
Refills: 0 | Status: DISCONTINUED | OUTPATIENT
Start: 2021-06-26 | End: 2021-06-28

## 2021-06-25 RX ADMIN — SENNA PLUS 2 TABLET(S): 8.6 TABLET ORAL at 21:52

## 2021-06-25 RX ADMIN — CALCITRIOL 0.5 MICROGRAM(S): 0.5 CAPSULE ORAL at 11:10

## 2021-06-25 RX ADMIN — CARBIDOPA, LEVODOPA, AND ENTACAPONE 1 TABLET(S): 50; 200; 200 TABLET, FILM COATED ORAL at 14:19

## 2021-06-25 RX ADMIN — Medication 1 APPLICATION(S): at 05:52

## 2021-06-25 RX ADMIN — Medication 1: at 12:08

## 2021-06-25 RX ADMIN — Medication 5 MILLIGRAM(S): at 11:10

## 2021-06-25 RX ADMIN — CARBIDOPA, LEVODOPA, AND ENTACAPONE 1 TABLET(S): 50; 200; 200 TABLET, FILM COATED ORAL at 21:52

## 2021-06-25 RX ADMIN — Medication 1 APPLICATION(S): at 11:19

## 2021-06-25 RX ADMIN — RASAGILINE 1 MILLIGRAM(S): 0.5 TABLET ORAL at 11:10

## 2021-06-25 RX ADMIN — BUDESONIDE AND FORMOTEROL FUMARATE DIHYDRATE 2 PUFF(S): 160; 4.5 AEROSOL RESPIRATORY (INHALATION) at 21:52

## 2021-06-25 RX ADMIN — HEPARIN SODIUM 0 UNIT(S)/HR: 5000 INJECTION INTRAVENOUS; SUBCUTANEOUS at 03:30

## 2021-06-25 RX ADMIN — HEPARIN SODIUM 0 UNIT(S)/HR: 5000 INJECTION INTRAVENOUS; SUBCUTANEOUS at 11:11

## 2021-06-25 RX ADMIN — Medication 81 MILLIGRAM(S): at 11:10

## 2021-06-25 RX ADMIN — MONTELUKAST 10 MILLIGRAM(S): 4 TABLET, CHEWABLE ORAL at 21:52

## 2021-06-25 RX ADMIN — HEPARIN SODIUM 800 UNIT(S)/HR: 5000 INJECTION INTRAVENOUS; SUBCUTANEOUS at 04:32

## 2021-06-25 RX ADMIN — BUDESONIDE AND FORMOTEROL FUMARATE DIHYDRATE 2 PUFF(S): 160; 4.5 AEROSOL RESPIRATORY (INHALATION) at 11:11

## 2021-06-25 RX ADMIN — HEPARIN SODIUM 700 UNIT(S)/HR: 5000 INJECTION INTRAVENOUS; SUBCUTANEOUS at 12:09

## 2021-06-25 RX ADMIN — APIXABAN 10 MILLIGRAM(S): 2.5 TABLET, FILM COATED ORAL at 17:51

## 2021-06-25 RX ADMIN — CARBIDOPA, LEVODOPA, AND ENTACAPONE 1 TABLET(S): 50; 200; 200 TABLET, FILM COATED ORAL at 05:52

## 2021-06-25 RX ADMIN — Medication 1 APPLICATION(S): at 17:51

## 2021-06-25 RX ADMIN — ATORVASTATIN CALCIUM 40 MILLIGRAM(S): 80 TABLET, FILM COATED ORAL at 21:52

## 2021-06-25 NOTE — GOALS OF CARE CONVERSATION - ADVANCED CARE PLANNING - CONVERSATION DETAILS
Palliative care team called pt's daughter Batool ([phone]) to discuss pt's current condition and goals of care. Palliative care team provided pt's daughter with update on pt's health status and poor prognosis. Pt's daughter shared that she has two other sisters, but that she is the primary point person and HCP. Pt's daughter stated that pt has been declining since she fell in August. Pt shared that the pt's  has also been have health issues, and that it has been challenging to be the caretaker for both parents. She stated that she is adjusting appropriately considering her parents' illnesses. Palliative care team discusses risks and benefits of a feeding tube, and pt's daughter stated that she didn't want the pt to having a feeding tube if it were appropriate. Pt's daughter stated that she wanted her mother to go back to [place] with hospice services. Pt's daughter stated that the pt is Orthodox. Chaplaincy referral was made. Pt's daughter agreed to reach out as needed.    Luther Soliz  [phone]

## 2021-06-25 NOTE — PROGRESS NOTE ADULT - PROBLEM SELECTOR PLAN 1
Patient presented with hypoxia and sob, hx of DVT s/p IVC filter   CXR clear   VQ scan + for PE   D dimer 3k  Started on Heparin drip

## 2021-06-25 NOTE — PROGRESS NOTE ADULT - SUBJECTIVE AND OBJECTIVE BOX
PGY-1 Progress Note discussed with attending    PAGER #: [[phone]] TILL 5:00 PM  PLEASE CONTACT ON CALL TEAM:  - On Call Team (Please refer to [place]) FROM 5:00 PM - 8:30PM  - Nightfloat Team FROM 8:30 -7:30 AM    CHIEF COMPLAINT & BRIEF HOSPITAL COURSE:  75 year old female, alert and confused at baseline, from [place] with past medical history of Parkinson's Disease, COPD, diverticulosis, HTN, DM, DVT and HLD is sent to the ED from NH for hypotension and hypoxia to 78% on 5L NC. Of note patient was recently admitted for rectal bleeding, CT A/P showed rectal wall thickening, colonic diverticulosis without diverticulitis, right adnexal mass and DVT in LLE, IVC filter placed due to inability to tolerate AC's in the setting of GIB. Colonoscopy done which showed rectal ulcer s/p 2 clips.  Patient admitted for acute hypoxic respiratory failure 2/2 PE, CXR is clear, VQ scan showed PE, on supplemental oxygen via NC, started on heparin drip now transitioned to Eliquis for anticoagulation   For COPD, on Duoneb, Albuterol, Symbicort and s/p Solumedrol IV  For HTN, holding meds as patient is low normal  For Parkinson continued to home meds Levodopa/Carbidopa/Entacapone  For GREG, on IVF gentle hydration, For, DM on HSS  Patient is DNR DNI COMFORT MEASURES ONLY, NO LAB DRAWS     INTERVAL HPI/OVERNIGHT EVENTS: patient refers feeling better compared to admission, denies sob or chest pain, titrating down oxygen as tolerated       MEDICATIONS  (STANDING):  apixaban 10 milliGRAM(s) Oral two times a day  aspirin  chewable 81 milliGRAM(s) Oral daily  atorvastatin 40 milliGRAM(s) Oral at bedtime  BACItracin   Ointment 1 Application(s) Topical four times a day  budesonide  80 MICROgram(s)/formoterol 4.5 MICROgram(s) Inhaler 2 Puff(s) Inhalation two times a day  calcitriol   Capsule 0.5 MICROGram(s) Oral daily  carbidopa/levodopa/entacapone 12.5/50/200 1 Tablet(s) Oral three times a day  insulin lispro (ADMELOG) corrective regimen sliding scale   SubCutaneous three times a day before meals  montelukast 10 milliGRAM(s) Oral at bedtime  oxybutynin XL 5 milliGRAM(s) Oral daily  rasagiline Tablet 1 milliGRAM(s) Oral daily  senna 2 Tablet(s) Oral at bedtime  sodium chloride 0.9%. 1000 milliLiter(s) (70 mL/Hr) IV Continuous <Continuous>    MEDICATIONS  (PRN):  acetaminophen   Tablet .. 650 milliGRAM(s) Oral every 6 hours PRN Temp greater or equal to 38C (100.4F), Moderate Pain (4 - 6)  ALBUTerol    90 MICROgram(s) HFA Inhaler 2 Puff(s) Inhalation every 6 hours PRN Shortness of Breath and/or Wheezing      Vital Signs Last 24 Hrs  T(C): 36.3 (25 Jun 2021 16:02), Max: 36.7 (24 Jun 2021 23:29)  T(F): 97.4 (25 Jun 2021 16:02), Max: 98.1 (25 Jun 2021 04:33)  HR: 76 (25 Jun 2021 16:02) (65 - 76)  BP: 146/69 (25 Jun 2021 16:02) (96/51 - 146/69)  BP(mean): --  RR: 18 (25 Jun 2021 16:02) (18 - 19)  SpO2: 100% (25 Jun 2021 16:02) (100% - 100%)    PHYSICAL EXAMINATION:  GENERAL: NAD, deconditioned, on NC, pale, poor nutrition and appetite   HEAD:  Atraumatic, Normocephalic  EYES:  conjunctiva and sclera clear  NECK: Supple, No JVD, Normal thyroid  CHEST/LUNG: Clear to auscultation. Clear to percussion bilaterally; No rales, rhonchi, wheezing, or rubs  HEART: Regular rate and rhythm; No murmurs, rubs, or gallops  ABDOMEN: Soft, Nontender, Nondistended; Bowel sounds present, no pain or masses on palpation  NERVOUS SYSTEM:  Alert & Oriented X1-2, unable to actively move LE, however UE with conserved strength, very pleasant  : incontinent   EXTREMITIES:  2+ Peripheral Pulses, No clubbing, cyanosis, or edema, contracted, scars on both knee from knee surgery    SKIN: warm dry                          8.8    7.41  )-----------( 267      ( 25 Jun 2021 10:28 )             28.3     06-25    142  |  115<H>  |  34<H>  ----------------------------<  126<H>  4.9   |  27  |  0.98    Ca    9.7      25 Jun 2021 07:41  Phos  2.7     06-25  Mg     2.0     06-25    TPro  6.0  /  Alb  3.0<L>  /  TBili  0.3  /  DBili  x   /  AST  23  /  ALT  17  /  AlkPhos  65  06-24    LIVER FUNCTIONS - ( 24 Jun 2021 10:39 )  Alb: 3.0 g/dL / Pro: 6.0 g/dL / ALK PHOS: 65 U/L / ALT: 17 U/L DA / AST: 23 U/L / GGT: x           CARDIAC MARKERS ( 24 Jun 2021 00:32 )  0.075 ng/mL / x     / x     / x     / x      CARDIAC MARKERS ( 23 Jun 2021 18:10 )  0.096 ng/mL / x     / x     / x     / x          PT/INR - ( 24 Jun 2021 13:01 )   PT: 12.4 sec;   INR: 1.04 ratio         PTT - ( 25 Jun 2021 10:28 )  PTT:>200.0 sec    I&O's Summary        Culture - Blood (collected 24 Jun 2021 13:38)  Source: .Blood Blood  Preliminary Report (25 Jun 2021 14:01):    No growth to date.

## 2021-06-25 NOTE — CONSULT NOTE ADULT - PROBLEM SELECTOR RECOMMENDATION 2
2/2 advanced dementia.  S/p multiple falls with fractures.  Bedbound.  Dependent.  High risk for skin failure.  Skin care per protocol.   Frequent positioning

## 2021-06-25 NOTE — CONSULT NOTE ADULT - TIME BILLING
I have examined pt personally Hx chart lab and xrays reviewed and pt discussed with residents
discussed w primary team

## 2021-06-25 NOTE — PROGRESS NOTE ADULT - SUBJECTIVE AND OBJECTIVE BOX
PULMONARY  progress note    HEMA NAJERA  [id number]    Patient is a 75y old  Female who presents with a chief complaint of hypotension and hypoxia (25 Jun 2021 08:45)  Feels better, no sob or chest pain      MEDICATIONS  (STANDING):  aspirin  chewable 81 milliGRAM(s) Oral daily  atorvastatin 40 milliGRAM(s) Oral at bedtime  BACItracin   Ointment 1 Application(s) Topical four times a day  budesonide  80 MICROgram(s)/formoterol 4.5 MICROgram(s) Inhaler 2 Puff(s) Inhalation two times a day  calcitriol   Capsule 0.5 MICROGram(s) Oral daily  carbidopa/levodopa/entacapone 12.5/50/200 1 Tablet(s) Oral three times a day  heparin  Infusion.  Unit(s)/Hr (9 mL/Hr) IV Continuous <Continuous>  insulin lispro (ADMELOG) corrective regimen sliding scale   SubCutaneous three times a day before meals  montelukast 10 milliGRAM(s) Oral at bedtime  oxybutynin XL 5 milliGRAM(s) Oral daily  rasagiline Tablet 1 milliGRAM(s) Oral daily  senna 2 Tablet(s) Oral at bedtime  sodium chloride 0.9%. 1000 milliLiter(s) (70 mL/Hr) IV Continuous <Continuous>        Allergies    No Known Allergies            PAST MEDICAL & SURGICAL HISTORY:  Parkinson disease    History of COPD    Diverticulosis    HTN (hypertension)    Diabetes mellitus    HLD (hyperlipidemia)    No significant past surgical history             REVIEW OF SYSTEMS:  CONSTITUTIONAL: No fever, weight loss, or fatigue   EYES: No eye pain, visual disturbances, or discharge  ENT:  No difficulty hearing, tinnitus, vertigo; No sinus or throat pain  NECK: No pain or stiffness or nodes  RESPIRATORY:  cough--   wheezing --  chills --  hemoptysis -- Shortness of Breath--  CARDIOVASCULAR: No chest pain, palpitations, passing out, dizziness, or leg swelling  GASTROINTESTINAL: No abdominal or epigastric pain. No nausea, vomiting,  GENITOURINARY: No dysuria, frequency, hematuria, or incontinence  NEUROLOGICAL: No headaches, memory loss, loss of strength, numbness  but  tremors  SKIN: No itching, burning, rashes, or lesions   LYMPH Nodes: No enlarged glands  ENDOCRINE: No heat or cold intolerance; No hair loss  MUSCULOSKELETAL: No joint pain or swelling; No muscle, back, or extremity pain  PSYCHIATRIC: No depression, anxiety, mood swings, or difficulty sleeping  HEME/LYMPH: No easy bruising, or bleeding gums  ALLERGY AND IMMUNOLOGIC: No hives or eczema        Vital Signs Last 24 Hrs  T(C): 36.6 (25 Jun 2021 07:15), Max: 37.3 (24 Jun 2021 11:24)  T(F): 97.9 (25 Jun 2021 07:15), Max: 99.1 (24 Jun 2021 11:24)  HR: 68 (25 Jun 2021 07:15) (65 - 74)  BP: 139/59 (25 Jun 2021 07:15) (96/51 - 139/59)  BP(mean): --  RR: 18 (25 Jun 2021 07:15) (18 - 20)  SpO2: 100% (25 Jun 2021 07:15) (100% - 100%)  I&O's Detail      PHYSICAL EXAMINATION:    GENERAL: The patient is a thin female in no apparent distress.   SKIN no rash ecchymoses or bruises  HEENT: Head is normocephalic and atraumatic  DORINA , Extraocular muscles are intact. Mucous membranes  moist.   Neck supple ,No LN felt JVP not increased  Thyroid not enlarged  Cardiovascular:  S1 S2 heard, RSR, No JVD , systolic  murmur at apex, No gallop or rub  Respiratory: Chest wall symmetrical with good air entry ,Percussion note normal,    Lungs vesicular breathing with  no  rales  or  wheeze	  ABDOMEN:  Soft, Non-tender,  no hepatomegaly or splenomegaly BS positive	  Extremities: Normal range of motion, No clubbing, cyanosis or edema  Vascular: Peripheral pulses palpable 2+ bilaterally  CNS:  Alert and oriented x 2   Cranial nerves intact  sensory intact  motor power5/5  dtr 3+   Babinski neg    LABS:                        8.8    7.41  )-----------( 267      ( 25 Jun 2021 10:28 )             28.3     06-25    142  |  115<H>  |  34<H>  ----------------------------<  126<H>  4.9   |  27  |  0.98    Ca    9.7      25 Jun 2021 07:41  Phos  2.7     06-25  Mg     2.0     06-25    TPro  6.0  /  Alb  3.0<L>  /  TBili  0.3  /  DBili  x   /  AST  23  /  ALT  17  /  AlkPhos  65  06-24    PT/INR - ( 24 Jun 2021 13:01 )   PT: 12.4 sec;   INR: 1.04 ratio         PTT - ( 25 Jun 2021 03:03 )  PTT:>200.0 sec    ABG - ( 24 Jun 2021 06:41 )  pH, Arterial: 7.48  pH, Blood: x     /  pCO2: 30    /  pO2: 229   / HCO3: 22    / Base Excess: -0.3  /  SaO2: 99          CARDIAC MARKERS ( 24 Jun 2021 00:32 )  0.075 ng/mL / x     / x     / x     / x      CARDIAC MARKERS ( 23 Jun 2021 18:10 )  0.096 ng/mL / x     / x     / x     / x          D-Dimer Assay, Quantitative: 3753 ng/mL DDU (06-24-21 @ 10:39)    Serum Pro-Brain Natriuretic Peptide: 2799 pg/mL (06-23-21 @ 18:10)      Procalcitonin, Serum: 0.08 ng/mL (06-24-21 @ 15:03)      Vitamin B12, Serum: 448 pg/mL (06-24-21 @ 14:07)        RADIOLOGY & ADDITIONAL STUDIES:        V/Q Scan-- Intermediate probability

## 2021-06-25 NOTE — DISCHARGE NOTE PROVIDER - CARE PROVIDER_API CALL
Art Diez  INTERNAL MEDICINE  [address] [address]  Phone: [phone]  Fax: [phone]  Follow Up Time: 1 week    Pepper Yan  INTERNAL MEDICINE  [address] [place] [address]  Phone: [phone]  Fax: [phone]  Follow Up Time: 1 week

## 2021-06-25 NOTE — PROGRESS NOTE ADULT - SUBJECTIVE AND OBJECTIVE BOX
CHIEF COMPLAINT & BRIEF HOSPITAL COURSE:  75 year old female, alert and confused at baseline, from [place] with past medical history of Parkinson's Disease, COPD, diverticulosis, HTN, DM, DVT and HLD is sent to the ED from NH for hypotension and hypoxia to 78% on 5L NC. Of note patient was recently admitted for rectal bleeding, CT A/P showed rectal wall thickening, colonic diverticulosis without diverticulitis, right adnexal mass and DVT in LLE, IVC filter placed due to inability to tolerate AC's in the setting of GIB. Colonoscopy done which showed rectal ulcer s/p 2 clips.  Patient admitted for acute hypoxic respiratory failure 2/2 PE, CXR is clear, VQ scan showed PE, on supplemental oxygen via NC, started on heparin drip   For COPD, on Duoneb, Albuterol, Symbicort and s/p Solumedrol IV  For HTN, holding meds as patient is low normal  For Parkinson continued to home meds Levodopa/Carbidopa/Entacapone  For GREG, on IVF gentle hydration, For, DM on HSS  family decided for hospice care given long term poor prognosis      INTERVAL HPI/OVERNIGHT EVENTS:  no c/o offered , confused , but not in any distress  MEDICATIONS  (STANDING):  aspirin  chewable 81 milliGRAM(s) Oral daily  atorvastatin 40 milliGRAM(s) Oral at bedtime  BACItracin   Ointment 1 Application(s) Topical four times a day  budesonide  80 MICROgram(s)/formoterol 4.5 MICROgram(s) Inhaler 2 Puff(s) Inhalation two times a day  calcitriol   Capsule 0.5 MICROGram(s) Oral daily  carbidopa/levodopa/entacapone 12.5/50/200 1 Tablet(s) Oral three times a day  heparin  Infusion.  Unit(s)/Hr (9 mL/Hr) IV Continuous <Continuous>  insulin lispro (ADMELOG) corrective regimen sliding scale   SubCutaneous three times a day before meals  montelukast 10 milliGRAM(s) Oral at bedtime  oxybutynin XL 5 milliGRAM(s) Oral daily  rasagiline Tablet 1 milliGRAM(s) Oral daily  senna 2 Tablet(s) Oral at bedtime  sodium chloride 0.9%. 1000 milliLiter(s) (70 mL/Hr) IV Continuous <Continuous>    MEDICATIONS  (PRN):  acetaminophen   Tablet .. 650 milliGRAM(s) Oral every 6 hours PRN Temp greater or equal to 38C (100.4F), Moderate Pain (4 - 6)  ALBUTerol    90 MICROgram(s) HFA Inhaler 2 Puff(s) Inhalation every 6 hours PRN Shortness of Breath and/or Wheezing    Vital Signs Last 24 Hrs  T(C): 36.5 (25 Jun 2021 11:43), Max: 36.9 (24 Jun 2021 15:50)  T(F): 97.7 (25 Jun 2021 11:43), Max: 98.4 (24 Jun 2021 15:50)  HR: 72 (25 Jun 2021 11:43) (65 - 74)  BP: 131/74 (25 Jun 2021 11:43) (96/51 - 139/59)  BP(mean): --  RR: 18 (25 Jun 2021 11:43) (18 - 19)  SpO2: 100% (25 Jun 2021 11:43) (100% - 100%)      PHYSICAL EXAMINATION:  GENERAL: NAD, deconditioned, on NC, pale, poor nutrition and appetite   HEAD:  Atraumatic, Normocephalic  EYES:  conjunctiva and sclera clear  NECK: Supple, No JVD, Normal thyroid  CHEST/LUNG: Clear to auscultation. Clear to percussion bilaterally; No rales, rhonchi, wheezing, or rubs  HEART: Regular rate and rhythm; No murmurs, rubs, or gallops  ABDOMEN: Soft, Nontender, Nondistended; Bowel sounds present, no pain or masses on palpation  NERVOUS SYSTEM:  Alert & Oriented X1, unable to actively move LE, however UE with conserved strength   : incontinent   EXTREMITIES:  2+ Peripheral Pulses, No clubbing, cyanosis, or edema, contracted, scars on both knee from knee surgery    SKIN: warm dry        LABS:                        8.8    7.41  )-----------( 267      ( 25 Jun 2021 10:28 )             28.3     06-25    142  |  115<H>  |  34<H>  ----------------------------<  126<H>  4.9   |  27  |  0.98    Ca    9.7      25 Jun 2021 07:41  Phos  2.7     06-25  Mg     2.0     06-25    TPro  6.0  /  Alb  3.0<L>  /  TBili  0.3  /  DBili  x   /  AST  23  /  ALT  17  /  AlkPhos  65  06-24    PT/INR - ( 24 Jun 2021 13:01 )   PT: 12.4 sec;   INR: 1.04 ratio         PTT - ( 25 Jun 2021 10:28 )  PTT:>200.0 sec    ABG - ( 24 Jun 2021 06:41 )  pH, Arterial: 7.48  pH, Blood: x     /  pCO2: 30    /  pO2: 229   / HCO3: 22    / Base Excess: -0.3  /  SaO2: 99                CARDIAC MARKERS ( 24 Jun 2021 00:32 )  0.075 ng/mL / x     / x     / x     / x      CARDIAC MARKERS ( 23 Jun 2021 18:10 )  0.096 ng/mL / x     / x     / x     / x            Serum Pro-Brain Natriuretic Peptide: 2799 pg/mL (06-23-21 @ 18:10)      Procalcitonin, Serum: 0.08 ng/mL (06-24-21 @ 15:03)                        10.4   8.63  )-----------( 308      ( 23 Jun 2021 18:10 )             31.8     06-23    138  |  104  |  31<H>  ----------------------------<  142<H>  4.3   |  26  |  1.55<H>    Ca    10.8<H>      23 Jun 2021 18:10    TPro  6.8  /  Alb  3.5  /  TBili  0.4  /  DBili  x   /  AST  39  /  ALT  10  /  AlkPhos  82  06-23    LIVER FUNCTIONS - ( 23 Jun 2021 18:10 )  Alb: 3.5 g/dL / Pro: 6.8 g/dL / ALK PHOS: 82 U/L / ALT: 10 U/L DA / AST: 39 U/L / GGT: x           CARDIAC MARKERS ( 24 Jun 2021 00:32 )  0.075 ng/mL / x     / x     / x     / x      CARDIAC MARKERS ( 23 Jun 2021 18:10 )  0.096 ng/mL / x     / x     / x     / x

## 2021-06-25 NOTE — PROGRESS NOTE ADULT - PROBLEM SELECTOR PLAN 4
Patient w/ hx of COPD  S/p Duoneb and solumedrol in ED  C/w albuterol, Symbicort, singular  Not on exacerbation  Patient is DNR DNI COMFORT MEASURES ONLY, NO LAB DRAWS

## 2021-06-25 NOTE — PROGRESS NOTE ADULT - PROBLEM SELECTOR PLAN 4
Patient w/ hx of COPD  S/p Duoneb and solumedrol in ED  C/w albuterol, Symbicort, singular  Not on exacerbation

## 2021-06-25 NOTE — PROGRESS NOTE ADULT - PROBLEM SELECTOR PLAN 2
Patient with hypoxia   CXR clear   On supplemental O2  V/Q scan + for PE, D dimer 3k  S/p Heparin drip  Started on Eliquis 10 mg BID x 7 then 5 mg qd  Aspiration precautions   Pulm Dr Trent  Heme onc Dr Yan  Patient is DNR DNI COMFORT MEASURES ONLY, NO LAB DRAWS

## 2021-06-25 NOTE — PROGRESS NOTE ADULT - PROBLEM SELECTOR PLAN 5
C/w with  HSS  Fingerstick before meals and at bedtime  DASH diet with consistent carb  Target -180  A1c 5.2

## 2021-06-25 NOTE — PROGRESS NOTE ADULT - PROBLEM SELECTOR PLAN 3
Cr 1.55 on admission-baseline normal  Monitor BMP daily    Avoid nephrotoxins, ACEI, ARBS or NSAIDs  Patient is DNR DNI COMFORT MEASURES ONLY, NO LAB DRAWS

## 2021-06-25 NOTE — CONSULT NOTE ADULT - ATTENDING COMMENTS
75 y F NH resident with advanced Parkinson's dementia, bedbound, A&Ox2 adm for hypotension and hypoxia. FAST 7c. Hospice eligible. Family requesting patient return to [facility]. DNR/DNI/DNH, no feeding tubes.

## 2021-06-25 NOTE — DISCHARGE NOTE PROVIDER - HOSPITAL COURSE
75 year old female, alert and confused at baseline, from [place] with past medical history of Parkinson's Disease, COPD, diverticulosis, HTN, DM, DVT and HLD is sent to the ED from NH for hypotension and hypoxia to 78% on 5L NC. Of note patient was recently admitted for rectal bleeding, CT A/P showed rectal wall thickening, colonic diverticulosis without diverticulitis, right adnexal mass and DVT in LLE, IVC filter placed due to inability to tolerate AC's in the setting of GIB. Colonoscopy done which showed rectal ulcer s/p 2 clips.  Patient admitted for acute hypoxic respiratory failure most likely 2/2 COPD exacerbation, CXR is clear, on supplemental oxygen NRB 10L, Duoneb, Solumedrol IV, Albuterol and Symbicort    For HTN, holding meds as patient is low normal, For Parkinson's continued to home meds Levodopa/Carbidopa/Entacapone, For GREG, on IVF gentle hydration, For, DM on HSS     75 year old female, alert and confused at baseline, from [place] with past medical history of Parkinson's Disease, COPD, diverticulosis, HTN, DM, DVT and HLD is sent to the ED from NH for hypotension and hypoxia to 78% on 5L NC. Of note patient was recently admitted for rectal bleeding, CT A/P showed rectal wall thickening, colonic diverticulosis without diverticulitis, right adnexal mass and DVT in LLE, IVC filter placed due to inability to tolerate AC's in the setting of GIB. Colonoscopy done which showed rectal ulcer s/p 2 clips.  Patient admitted for acute hypoxic respiratory failure most likely 2/2 COPD exacerbation, CXR is clear, on supplemental oxygen NRB 10L, Duoneb, Solumedrol IV, Albuterol and Symbicort    For HTN, holding meds as patient is low normal, For Parkinson's continued to home meds Levodopa/Carbidopa/Entacapone, For GREG, on IVF gentle hydration, For, DM on HSS    Patient is stable for discharge and is advised to follow up with PCP as outpatient.  Please refer to patient's complete medical chart with documents for a full hospital course, for this is only a brief summary.

## 2021-06-25 NOTE — PROGRESS NOTE ADULT - PROBLEM SELECTOR PLAN 6
C/w home medication Carbidopa-Levodopa-Entacapone  Patient is DNR DNI COMFORT MEASURES ONLY, NO LAB DRAWS

## 2021-06-25 NOTE — DISCHARGE NOTE PROVIDER - NSDCCPCAREPLAN_GEN_ALL_CORE_FT
PRINCIPAL DISCHARGE DIAGNOSIS  Diagnosis: Pulmonary embolism  Assessment and Plan of Treatment: You came to the hospital complaining of shortness of breath. You were diagnosed with Pulmonary Embolism which means one or more arteries have blood clots that travel from other parts of the body like the legs.      SECONDARY DISCHARGE DIAGNOSES  Diagnosis: Deep vein thrombosis (DVT)  Assessment and Plan of Treatment:     Diagnosis: Diabetes mellitus  Assessment and Plan of Treatment: You have a history of diabetes.   Your HbA1c was XXXX during this admission.  You need to continue monitoring your blood sugar levels closely and maintain healthy lifestyle by eating healthy diabetic regimen, weight loss and exercise regularly as tolerated.  Please continue to take your medications as prescribed.   Please follow up with your PCP/Endocrinologist within a week of discharge.      Diagnosis: Parkinson disease  Assessment and Plan of Treatment: Parkinson disease    Diagnosis: COPD (chronic obstructive pulmonary disease)  Assessment and Plan of Treatment: You came in with worsening shortness of breath and cough, Chest X-ray was done which showed pneumonia. Infectious disease specialist and pulmonologist was consulted, recommended to get blood cultures which came negative, viral panel was positive. You were diagnosed to have COPD exacerbation secondary to pneumonia. Antibiotics, steroids and inhalers were given which helped with your symptoms. Please continue taking medications as prescribed and follow up with your PCP and Pulmonologist in 1 week.      Diagnosis: Hypertension  Assessment and Plan of Treatment: You have a history of Hypertension. XXXX You have been diagnosed with Hypertension.   On this admission, your Blood Pressure was adequately controlled with XXXXXX Amlodipine, Losartan, Lisinopril, Lasix, Hydralazine, Clonidine.   Your blood pressure target is 120-140/80-90, maintain healthy lifestyle, low salt diet, avoid fatty food, weight loss, exercise regularly as tolerated 30 mins X 3 times per week.  Notify your doctor if you have any of the following symptoms:   (Dizziness, Lightheadedness, Blurry vision, Headache, Chest pain, Shortness of breath.)  Please follow-up with your PCP in 1 week.       PRINCIPAL DISCHARGE DIAGNOSIS  Diagnosis: Pulmonary embolism  Assessment and Plan of Treatment: You came to the hospital complaining of shortness of breath. You were diagnosed with Pulmonary Embolism which means one or more arteries have blood clots that travel from other parts of the body like the legs. You were treated with IV HEPARIN then transitioned to ELIQUIS 10 MG TWICE A DAY FOR 7 DAYS THEN 5 MG TWICE A DAY. Please take medications as prescribed. Your daughter decided to keep you comfortable and no agressive measures.  You are being discharge to HOSPICE.      SECONDARY DISCHARGE DIAGNOSES  Diagnosis: Deep vein thrombosis (DVT)  Assessment and Plan of Treatment: You have history of DVT- deep vein thrombosis which means blood clots in a deep vein, usually in the legs. You had an IVC filter placed in the past and now with diagnosis of PE pulmonary embolism. You are anticoagulated with ELIQUIS 10 MG TWICE A DAY FOR 7 DAYS THEN 5 MG TWICE A DAY. Please take medications as prescribed. Your daughter decided to keep you comfortable and no agressive measures.  You are being discharge to HOSPICE.    Diagnosis: Diabetes mellitus  Assessment and Plan of Treatment: You have a history of diabetes.  You need to continue monitoring your blood sugar levels closely and maintain healthy lifestyle by eating healthy diabetic regimen.  Your daughter decided to keep you comfortable and no agressive measures.  You are being discharge to HOSPICE.       Diagnosis: Parkinson disease  Assessment and Plan of Treatment: Parkinson disease    Diagnosis: COPD (chronic obstructive pulmonary disease)  Assessment and Plan of Treatment: You have history of COPD- chronic obstructive pulmonary disease. Please continue taking medications as prescribed.   Your daughter decided to keep you comfortable and no agressive measures. You are being discharge to HOSPICE.       Diagnosis: Hypertension  Assessment and Plan of Treatment: You have a history of Hypertension.   Your blood pressure target is 120-140/80-90.  Notify your doctor if you have any of the following symptoms:   (Dizziness, Lightheadedness, Blurry vision, Headache, Chest pain, Shortness of breath.)  Your daughter decided to keep you comfortable and no agressive measures. You are being discharge to HOSPICE.        PRINCIPAL DISCHARGE DIAGNOSIS  Diagnosis: Pulmonary embolism  Assessment and Plan of Treatment: You came to the hospital complaining of shortness of breath. You were diagnosed with Pulmonary Embolism which means one or more arteries have blood clots that travel from other parts of the body like the legs. You were treated with IV HEPARIN then transitioned to ELIQUIS 10 MG TWICE A DAY FOR 7 DAYS THEN 5 MG TWICE A DAY. Please take medications as prescribed.   Your daughter decided to keep you COMFORTABLE and NO AGGRESSIVE MEASURES.  You are being discharge to HOSPICE.      SECONDARY DISCHARGE DIAGNOSES  Diagnosis: Deep vein thrombosis (DVT)  Assessment and Plan of Treatment: You have history of DVT- deep vein thrombosis which means blood clots in a deep vein, usually in the legs. You had an IVC filter placed in the past and now with diagnosis of PE pulmonary embolism. You are anticoagulated with ELIQUIS 10 MG TWICE A DAY FOR 7 DAYS THEN 5 MG TWICE A DAY. Please take medications as prescribed.   Your daughter decided to keep you COMFORTABLE and NO AGGRESSIVE MEASURES.  You are being discharge to HOSPICE.    Diagnosis: Diabetes mellitus  Assessment and Plan of Treatment: You have a history of diabetes.  You need to continue monitoring your blood sugar levels closely and maintain healthy lifestyle by eating healthy diabetic regimen.  Your daughter decided to keep you COMFORTABLE and NO AGGRESSIVE MEASURES.  You are being discharge to HOSPICE.       Diagnosis: Parkinson disease  Assessment and Plan of Treatment: You have history of Parkinson disease on CARBIDOPA/  LEVIDOPA / ENTARCAPONE. Please continue to take your home medications. Your daughter decided to keep you COMFORTABLE and NO AGGRESSIVE MEASURES.  You are being discharge to HOSPICE.    Diagnosis: COPD (chronic obstructive pulmonary disease)  Assessment and Plan of Treatment: You have history of COPD- chronic obstructive pulmonary disease. Please continue taking medications as prescribed.   Your daughter decided to keep you comfortable and no agressive measures. You are being discharge to HOSPICE.       Diagnosis: Hypertension  Assessment and Plan of Treatment: You have a history of Hypertension NOT ON ANY MEDICATIONS AT THIS TIME. Your blood pressure has been in an acceptable range. You do not require any anti hypertensive medications.   Your daughter decided to keep you COMFORTABLE and NO AGGRESSIVE MEASURES.  You are being discharge to HOSPICE.        PRINCIPAL DISCHARGE DIAGNOSIS  Diagnosis: Pulmonary embolism  Assessment and Plan of Treatment: You came to the hospital complaining of shortness of breath. You were diagnosed with Pulmonary Embolism which means one or more arteries have blood clots that travel from other parts of the body like the legs. You were treated with IV HEPARIN then transitioned to ELIQUIS 10 MG TWICE A DAY FOR 7 DAYS THEN 5 MG TWICE A DAY. Please take ELIQUIS 10 MG FOR 3 MORE DAYS TO COMPLETE 7 DAYS 6/29, 6/30 AND 7/1 THEN TAKE 5 MG TWICE A DAY. Please take medications as prescribed.    Your daughter decided to keep you COMFORTABLE and NO AGGRESSIVE MEASURES.  You are being discharge to HOSPICE.      SECONDARY DISCHARGE DIAGNOSES  Diagnosis: Deep vein thrombosis (DVT)  Assessment and Plan of Treatment: You have history of DVT- deep vein thrombosis which means blood clots in a deep vein, usually in the legs. You had an IVC filter placed in the past and now with diagnosis of PE pulmonary embolism. You are anticoagulated with ELIQUIS 10 MG TWICE A DAY FOR 7 DAYS THEN 5 MG TWICE A DAY. Please take medications as prescribed and described above.   Your daughter decided to keep you COMFORTABLE and NO AGGRESSIVE MEASURES.  You are being discharge to HOSPICE.    Diagnosis: Diabetes mellitus  Assessment and Plan of Treatment: You have a history of diabetes.  You need to continue monitoring your blood sugar levels closely and maintain healthy lifestyle by eating healthy diabetic regimen.  Your daughter decided to keep you COMFORTABLE and NO AGGRESSIVE MEASURES.  You are being discharge to HOSPICE.       Diagnosis: Parkinson disease  Assessment and Plan of Treatment: You have history of Parkinson disease on CARBIDOPA/  LEVIDOPA / ENTARCAPONE. Please continue to take your home medications. Your daughter decided to keep you COMFORTABLE and NO AGGRESSIVE MEASURES.  You are being discharge to HOSPICE.    Diagnosis: COPD (chronic obstructive pulmonary disease)  Assessment and Plan of Treatment: You have history of COPD- chronic obstructive pulmonary disease. Please continue taking medications as prescribed.   Your daughter decided to keep you comfortable and no agressive measures. You are being discharge to HOSPICE.       Diagnosis: Hypertension  Assessment and Plan of Treatment: You have a history of Hypertension NOT ON ANY MEDICATIONS AT THIS TIME. Your blood pressure has been in an acceptable range. You do not require any anti hypertensive medications.   Your daughter decided to keep you COMFORTABLE and NO AGGRESSIVE MEASURES.  You are being discharge to HOSPICE.

## 2021-06-25 NOTE — CONSULT NOTE ADULT - PROBLEM SELECTOR RECOMMENDATION 3
Bitemporal wasting.  Albumin 3.0.         Clinical evidence indicates that the patient has Moderate protein calorie malnutrition/ 2nd degree    In context of Chronic Illness (>1 month)  Energy/Food intake <75% of estimated energy requirement >7 days   Muscle mass loss: Mild   Fluid Accumulation: Mild    Strength: weakened Mild     Nutrition consult    Family does not want PEG, should pt stops eating.

## 2021-06-25 NOTE — CONSULT NOTE ADULT - PROBLEM SELECTOR RECOMMENDATION 9
Diagnosed in 2015 with Parkinson's.  Pt is Aox2, confused.  Dependent on all ADLs.  C/w Stalevo.  FAST 7c.  Pt is clinically appropriate for hospice.      Educated pt's daughter Batool about hospice philosophy and locations of care.    She stated in a perfect world she would have wanted to provide care at home for her mother.  Unfortunately, they cannot so she is agreeable for hospice at [facility].  Comfort measures only    Pt is DNR/DNI

## 2021-06-25 NOTE — DISCHARGE NOTE PROVIDER - NSDCMRMEDTOKEN_GEN_ALL_CORE_FT
ascorbic acid 500 mg oral tablet: 1 tab(s) orally once a day  bacitracin 500 units/g topical ointment: Apply topically to affected area 4 times a day  calcitriol 0.5 mcg oral capsule: 1 cap(s) orally once a day  carbidopa/levodopa/entacapone 12.5 mg-50 mg-200 mg oral tablet: 1 tab(s) orally 3 times a day  droxidopa 100 mg oral capsule: 1 cap(s) orally 3 times a day  enalapril 20 mg oral tablet: 1 tab(s) orally 2 times a day  ferrous sulfate 325 mg (65 mg elemental iron) oral tablet: 1 tab(s) orally once a day  glipiZIDE 5 mg oral tablet: 1 tab(s) orally once a day  MiraLax oral powder for reconstitution: 1 packet(s) orally once a day, As Needed  Nourianz 20 mg oral tablet: 1 tab(s) orally once a day  oxybutynin 5 mg/24 hours oral tablet, extended release: 1 tab(s) orally once a day  rasagiline 1 mg oral tablet: 1 tab(s) orally once a day  Senna 8.6 mg oral tablet: 2 tab(s) orally once a day (at bedtime)  Tylenol 325 mg oral tablet: 2 tab(s) orally every 6 hours, As Needed   acetaminophen 325 mg oral tablet: 2 tab(s) orally every 6 hours, As needed, Temp greater or equal to 38C (100.4F), Moderate Pain (4 - 6)  albuterol 90 mcg/inh inhalation aerosol: 2 puff(s) inhaled every 6 hours, As needed, Shortness of Breath and/or Wheezing  apixaban 5 mg oral tablet: 2 tab(s) orally 2 times a day for three more days to complete 7 days (6/29, 6/30, 7/1) then take 1 tab orally 2 times a day starting 7/2  ascorbic acid 500 mg oral tablet: 1 tab(s) orally once a day  atorvastatin 40 mg oral tablet: 1 tab(s) orally once a day (at bedtime)  bacitracin 500 units/g topical ointment: Apply topically to affected area 4 times a day  budesonide-formoterol 80 mcg-4.5 mcg/inh inhalation aerosol: 2 puff(s) inhaled 2 times a day  calcitriol 0.5 mcg oral capsule: 1 cap(s) orally once a day  carbidopa/levodopa/entacapone 12.5 mg-50 mg-200 mg oral tablet: 1 tab(s) orally 3 times a day  ferrous sulfate 325 mg (65 mg elemental iron) oral tablet: 1 tab(s) orally once a day  MiraLax oral powder for reconstitution: 1 packet(s) orally once a day, As Needed  montelukast 10 mg oral tablet: 1 tab(s) orally once a day (at bedtime)  morphine: 0.5 milligram(s) intravenous every 8 hours, As Needed for severe pain  oxybutynin 5 mg/24 hours oral tablet, extended release: 1 tab(s) orally once a day  rasagiline 1 mg oral tablet: 1 tab(s) orally once a day  Senna 8.6 mg oral tablet: 2 tab(s) orally once a day (at bedtime)

## 2021-06-25 NOTE — PROGRESS NOTE ADULT - PROBLEM SELECTOR PLAN 7
Started on Eliquis full   Palliative on board   Patient is DNR DNI COMFORT MEASURES ONLY, NO LAB DRAWS

## 2021-06-25 NOTE — CONSULT NOTE ADULT - PROBLEM SELECTOR RECOMMENDATION 4
Patient, a 75 year old female from [place] with past medical history of Parkinson's Disease, COPD, diverticulosis, HTN, DM, DVT and HLD is sent to the ED from NH for hypotension and hypoxia. Pt is DNR/DNI.  CT showed rectal thickening, adnexal mass, and DVT.  Family wants hospice at [facility].  Focus on comfort measures only.   Please see discussion  noted above in GOC conversation.

## 2021-06-25 NOTE — PROGRESS NOTE ADULT - SUBJECTIVE AND OBJECTIVE BOX
no bleeding  on heparin   V/Q positive    MEDICATIONS  (STANDING):  aspirin  chewable 81 milliGRAM(s) Oral daily  atorvastatin 40 milliGRAM(s) Oral at bedtime  BACItracin   Ointment 1 Application(s) Topical four times a day  budesonide  80 MICROgram(s)/formoterol 4.5 MICROgram(s) Inhaler 2 Puff(s) Inhalation two times a day  calcitriol   Capsule 0.5 MICROGram(s) Oral daily  carbidopa/levodopa/entacapone 12.5/50/200 1 Tablet(s) Oral three times a day  heparin  Infusion.  Unit(s)/Hr (9 mL/Hr) IV Continuous <Continuous>  insulin lispro (ADMELOG) corrective regimen sliding scale   SubCutaneous three times a day before meals  montelukast 10 milliGRAM(s) Oral at bedtime  oxybutynin XL 5 milliGRAM(s) Oral daily  rasagiline Tablet 1 milliGRAM(s) Oral daily  senna 2 Tablet(s) Oral at bedtime  sodium chloride 0.9%. 1000 milliLiter(s) (70 mL/Hr) IV Continuous <Continuous>    MEDICATIONS  (PRN):  acetaminophen   Tablet .. 650 milliGRAM(s) Oral every 6 hours PRN Temp greater or equal to 38C (100.4F), Moderate Pain (4 - 6)  ALBUTerol    90 MICROgram(s) HFA Inhaler 2 Puff(s) Inhalation every 6 hours PRN Shortness of Breath and/or Wheezing      Allergies    No Known Allergies    Intolerances        Vital Signs Last 24 Hrs  T(C): 36.6 (25 Jun 2021 07:15), Max: 37.3 (24 Jun 2021 11:24)  T(F): 97.9 (25 Jun 2021 07:15), Max: 99.1 (24 Jun 2021 11:24)  HR: 68 (25 Jun 2021 07:15) (65 - 74)  BP: 139/59 (25 Jun 2021 07:15) (96/51 - 139/59)  BP(mean): --  RR: 18 (25 Jun 2021 07:15) (18 - 20)  SpO2: 100% (25 Jun 2021 07:15) (100% - 100%)    PHYSICAL EXAM  General: adult in NAD  HEENT: clear oropharynx, anicteric sclera, pink conjunctiva  Neck: supple  CV: normal S1/S2 with no murmur rubs or gallops  Lungs: positive air movement b/l ant lungs,clear to auscultation, no wheezes, no rales  Abdomen: soft non-tender non-distended, no hepatosplenomegaly  Ext: no clubbing cyanosis or edema  Skin: no rashes and no petechiae  Neuro: alert and oriented X 4, no focal deficits  LABS:                          8.8    9.20  )-----------( 305      ( 24 Jun 2021 20:35 )             27.7         Mean Cell Volume : 90.2 fl  Mean Cell Hemoglobin : 28.7 pg  Mean Cell Hemoglobin Concentration : 31.8 gm/dL  Auto Neutrophil # : x  Auto Lymphocyte # : x  Auto Monocyte # : x  Auto Eosinophil # : x  Auto Basophil # : x  Auto Neutrophil % : x  Auto Lymphocyte % : x  Auto Monocyte % : x  Auto Eosinophil % : x  Auto Basophil % : x    Serial CBC  Hematocrit 27.7  Hemoglobin 8.8  Plat 305  RBC 3.07  WBC 9.20  Serial CBC  Hematocrit 28.3  Hemoglobin 9.2  Plat 299  RBC 3.19  WBC 6.84  Serial CBC  Hematocrit 31.8  Hemoglobin 10.4  Plat 308  RBC 3.54  WBC 8.63    06-25    142  |  115<H>  |  34<H>  ----------------------------<  126<H>  4.9   |  27  |  0.98    Ca    9.7      25 Jun 2021 07:41  Phos  2.7     06-25  Mg     2.0     06-25    TPro  6.0  /  Alb  3.0<L>  /  TBili  0.3  /  DBili  x   /  AST  23  /  ALT  17  /  AlkPhos  65  06-24      PT/INR - ( 24 Jun 2021 13:01 )   PT: 12.4 sec;   INR: 1.04 ratio         PTT - ( 25 Jun 2021 03:03 )  PTT:>200.0 sec    Vitamin B12, Serum: 448 pg/mL (06-24 @ 14:07)            BLOOD SMEAR INTERPRETATION:       RADIOLOGY & ADDITIONAL STUDIES:

## 2021-06-25 NOTE — PROGRESS NOTE ADULT - PROBLEM SELECTOR PLAN 2
Patient with hypoxia   CXR clear   On supplemental O2  V/Q scan + for PE, D dimer 3k  Started on Heparin drip   Aspiration precautions   Pulm Dr Trent  Heme onc Dr Yan

## 2021-06-25 NOTE — CONSULT NOTE ADULT - CONVERSATION DETAILS
Palliative care team   spoke with the pt's daughter Batool, discussed her current clinical condition.  She verbalized understanding.  She stated the family would have preferred to keep her at home, however, they also have their father who has advanced dementia to care for.  Discussed hospice philosophy and explained that [facility] provided hospice.  She stated hospice is a  good option.  She doesn't want her mother to be going in and out of the hospitals.  She really wants her to be kept comfortable.    Reviewed MOLST; DNR/DNI.  Discussed the risk vs benefits of artificial nutrition in advanced dementia.  She stated her mother would not want a feeding tube.    Chaplaincy services offered and accepted.    SW referral made for [facility] with hospice.  All questions answered.  Support provided.     Plan discussed with the primary team. Palliative care team spoke with the pt's daughter Batool, discussed her current clinical condition.  She verbalized understanding.  She stated the family would have preferred to keep her at home, however, they also have their father who has advanced dementia to care for.  Discussed hospice philosophy and explained that [facility] provided hospice.  She stated hospice is a  good option.  She doesn't want her mother to be going in and out of the hospitals.  She really wants her to be kept comfortable.    Reviewed MOLST; DNR/DNI.  Discussed the risk vs benefits of artificial nutrition in advanced dementia.  She stated her mother would not want a feeding tube.    Chaplaincy services offered and accepted.    SW referral made for [facility] with hospice.  All questions answered.  Support provided.     Plan discussed with the primary team.

## 2021-06-25 NOTE — PROGRESS NOTE ADULT - PROBLEM SELECTOR PLAN 1
Patient presented with hypoxia and sob, hx of DVT s/p IVC filter   CXR clear   VQ scan + for PE   D dimer 3k  S/p Heparin drip  Started on Eliquis 10 mg BID x 7 then 5 mg BID  Patient is DNR DNI COMFORT MEASURES ONLY, NO LAB DRAWS

## 2021-06-25 NOTE — DISCHARGE NOTE PROVIDER - PROVIDER TOKENS
PROVIDER:[TOKEN:[[id number]],FOLLOWUP:[1 week]],PROVIDER:[TOKEN:[[id number]],FOLLOWUP:[1 week]]

## 2021-06-25 NOTE — CONSULT NOTE ADULT - SUBJECTIVE AND OBJECTIVE BOX
HPI:  Patient, a 75 year old female from [place] with past medical history of Parkinson's Disease, COPD, diverticulosis, HTN, DM, DVT and HLD is sent to the ED from NH for hypotension and hypoxia. As per NH chart, patient sent for low blood pressure of 80/40 and hypoxia to 78% 5LNC. Patient is confused and unable to answer questions. As per chart, patient is alert, responsive and has periodic confusion at baseline. Patient was discharged only 2 days ago from [facility] after being admitted for rectal bleeding. At the time, CT A/P showed rectal wall thickening, colonic diverticulosis without diverticulitis, right adnexal mass, and DVT in LLE. Patient had an IVC filter placed due to inability to tolerate AC's in the setting of GIB. Colonoscopy with finding of rectal ulcer s/p 2 clips.  When seen at bedside, patient found to be desaturating to 70s on RA (had oxygen off). Patient placed on 5-6L NC saturating well.     Interval hx:  Pt is AOX2, no insight as to clinical condition.   #[id number]  facilitated exam.       PAST MEDICAL & SURGICAL HISTORY:  Parkinson disease    History of COPD    Diverticulosis    HTN (hypertension)    Diabetes mellitus    HLD (hyperlipidemia)    No significant past surgical history        SOCIAL HISTORY:    Admitted from:  [facility]    Synagogue:  [employer]                                Preferred Language:  Colombian    Surrogate/HCP/Guardian:  Batool Johnson          Phone#: [phone]    FAMILY HISTORY:  Pt is unable to participate  Baseline ADLs (prior to admission): bedbound    Allergies    No Known Allergies    Intolerances      Present Symptoms:   Dyspnea: denies  Loss of appetite:   Pain:     denies                                Review of Systems: [All others negative     MEDICATIONS  (STANDING):  aspirin  chewable 81 milliGRAM(s) Oral daily  atorvastatin 40 milliGRAM(s) Oral at bedtime  BACItracin   Ointment 1 Application(s) Topical four times a day  budesonide  80 MICROgram(s)/formoterol 4.5 MICROgram(s) Inhaler 2 Puff(s) Inhalation two times a day  calcitriol   Capsule 0.5 MICROGram(s) Oral daily  carbidopa/levodopa/entacapone 12.5/50/200 1 Tablet(s) Oral three times a day  heparin  Infusion.  Unit(s)/Hr (9 mL/Hr) IV Continuous <Continuous>  insulin lispro (ADMELOG) corrective regimen sliding scale   SubCutaneous three times a day before meals  montelukast 10 milliGRAM(s) Oral at bedtime  oxybutynin XL 5 milliGRAM(s) Oral daily  rasagiline Tablet 1 milliGRAM(s) Oral daily  senna 2 Tablet(s) Oral at bedtime  sodium chloride 0.9%. 1000 milliLiter(s) (70 mL/Hr) IV Continuous <Continuous>    MEDICATIONS  (PRN):  acetaminophen   Tablet .. 650 milliGRAM(s) Oral every 6 hours PRN Temp greater or equal to 38C (100.4F), Moderate Pain (4 - 6)  ALBUTerol    90 MICROgram(s) HFA Inhaler 2 Puff(s) Inhalation every 6 hours PRN Shortness of Breath and/or Wheezing      PHYSICAL EXAM:    Vital Signs Last 24 Hrs  T(C): 36.5 (25 Jun 2021 11:43), Max: 36.9 (24 Jun 2021 15:50)  T(F): 97.7 (25 Jun 2021 11:43), Max: 98.4 (24 Jun 2021 15:50)  HR: 72 (25 Jun 2021 11:43) (65 - 74)  BP: 131/74 (25 Jun 2021 11:43) (96/51 - 139/59)  BP(mean): --  RR: 18 (25 Jun 2021 11:43) (18 - 19)  SpO2: 100% (25 Jun 2021 11:43) (100% - 100%)    General: Fragile elderly woman.  AOX2.  Confused.  NAD  Karnofsky Performance Score/Palliative Performance Status Version2: 40    %    HEENT: bitemporal wasting, clear oropharynx  Lungs: unlabored on RA  CV: RRR  GI: soft, non tender onpalpation  : incontinent  Musculoskeletal: bedbound  Skin: no rash or lesions noted  Neuro: able to follow simple commands  Oral intake ability: moderate po intake  Diet: soft    LABS:                        8.8    7.41  )-----------( 267      ( 25 Jun 2021 10:28 )             28.3     06-25    142  |  115<H>  |  34<H>  ----------------------------<  126<H>  4.9   |  27  |  0.98    Ca    9.7      25 Jun 2021 07:41  Phos  2.7     06-25  Mg     2.0     06-25    TPro  6.0  /  Alb  3.0<L>  /  TBili  0.3  /  DBili  x   /  AST  23  /  ALT  17  /  AlkPhos  65  06-24    < from: CT Abdomen and Pelvis w/ IV Cont (06.01.21 @ 10:11) >  EXAM:  CT ABDOMEN AND PELVIS IC                            PROCEDURE DATE:  06/01/2021          INTERPRETATION:  CLINICAL INFORMATION: Rectal bleeding    COMPARISON: None.    CONTRAST/COMPLICATIONS:  IV Contrast: Omnipaque 350  90 cc [id number] cc discarded  Oral Contrast: NONE  Complications: None reported at time of study completion    PROCEDURE:  CT of the Abdomen and Pelvis was performed.  Sagittal and coronal reformats were performed.    FINDINGS:  LOWER CHEST: Clear lung bases.    LIVER: Multiple subcentimeter hepatic hypodensities, too small to further characterize.  BILE DUCTS: Normal caliber.  GALLBLADDER: Cholelithiasis.  SPLEEN: Within normal limits.  PANCREAS: Mildly atrophic.  ADRENALS: Within normal limits.  KIDNEYS/URETERS: Subcentimeter renal hypodensities, too small to further characterize. No hydronephrosis.    BLADDER: Within normal limits.  REPRODUCTIVE ORGANS: Uterus and left adnexa within normal limits. 3.7 x 2.8 cm right adnexal mass.    BOWEL: No bowel obstruction. Appendix is normal. Colonic diverticulosis. Rectal wall thickening.  PERITONEUM: Trace pelvic fluid.  VESSELS: Atherosclerotic changes. Filling defects noted within the left common femoral, femoral, and deep femoral veins.  RETROPERITONEUM/LYMPH NODES: No lymphadenopathy.  ABDOMINAL WALL: Small fat-containing left inguinal hernia.  BONES: Degenerative changes. Severe compression deformity of L2. Fixation cement noted within the L2 vertebral body. Mild wedge deformity of T11.    IMPRESSION:    1. Rectal wall thickening, question proctitis.  2. Colonic diverticulosis without CT evidence of acute diverticulitis. No bowel obstruction.  3. Right adnexal mass (3.7 x 2.8 cm). Recommend pelvic ultrasound for further assessment.  4. Deep venous thrombosis within the imaged left lower extremity (left common femoral, femoral, and deep femoral veins).    Findings were discussed with Dr. Arellano by telephone on 6/1/2021 at 1035 hours.        < end of copied text >      RADIOLOGY & ADDITIONAL STUDIES: Reviewed    ADVANCE DIRECTIVES: DNR/DNI  
PULMONARY CONSULT NOTE      HEMA NAJERA  MRN-[id number]  Source of Hx ---Chart  Patient is a 75y old  Female who presents with a chief complaint of hypotension and hypoxia (24 Jun 2021 09:32)   chart lab and xrays reviewed  Pt examined, Non communicative    HISTORY OF PRESENT ILLNESS:Patient, a 75 year old female from [place] with past medical history of Parkinson's Disease, COPD, diverticulosis, HTN, DM, DVT and HLD is sent to the ED from NH for hypotension and hypoxia. As per NH chart, patient sent for low blood pressure of 80/40 and hypoxia to 78% 5LNC. Patient is confused and unable to answer questions. As per chart, patient is alert, responsive and has periodic confusion at baseline. Patient was discharged only 2 days ago from [facility] after being admitted for rectal bleeding. At the time, CT A/P showed rectal wall thickening, colonic diverticulosis without diverticulitis, right adnexal mass, and DVT in LLE. Patient had an IVC filter placed due to inability to tolerate AC's in the setting of GIB. Colonoscopy with finding of rectal ulcer s/p 2 clips.  When seen at bedside, patient found to be desaturating to 70s on RA (had oxygen off). Patient placed on 5-6L NC saturating well.       Home Medications:  ascorbic acid 500 mg oral tablet: 1 tab(s) orally once a day (23 Jun 2021 21:50)  bacitracin 500 units/g topical ointment: Apply topically to affected area 4 times a day (23 Jun 2021 21:50)  calcitriol 0.5 mcg oral capsule: 1 cap(s) orally once a day (23 Jun 2021 21:50)  carbidopa/levodopa/entacapone 12.5 mg-50 mg-200 mg oral tablet: 1 tab(s) orally 3 times a day (23 Jun 2021 21:50)  droxidopa 100 mg oral capsule: 1 cap(s) orally 3 times a day (23 Jun 2021 21:50)  enalapril 20 mg oral tablet: 1 tab(s) orally 2 times a day (23 Jun 2021 21:50)  ferrous sulfate 325 mg (65 mg elemental iron) oral tablet: 1 tab(s) orally once a day (23 Jun 2021 21:50)  glipiZIDE 5 mg oral tablet: 1 tab(s) orally once a day (23 Jun 2021 21:50)  MiraLax oral powder for reconstitution: 1 packet(s) orally once a day, As Needed (23 Jun 2021 21:50)  Nourianz 20 mg oral tablet: 1 tab(s) orally once a day (23 Jun 2021 21:50)  oxybutynin 5 mg/24 hours oral tablet, extended release: 1 tab(s) orally once a day (23 Jun 2021 21:50)  rasagiline 1 mg oral tablet: 1 tab(s) orally once a day (23 Jun 2021 21:50)  Senna 8.6 mg oral tablet: 2 tab(s) orally once a day (at bedtime) (23 Jun 2021 21:50)  Tylenol 325 mg oral tablet: 2 tab(s) orally every 6 hours, As Needed (23 Jun 2021 21:50)      MEDICATIONS  (STANDING):  aspirin  chewable 81 milliGRAM(s) Oral daily  atorvastatin 40 milliGRAM(s) Oral at bedtime  BACItracin   Ointment 1 Application(s) Topical four times a day  budesonide  80 MICROgram(s)/formoterol 4.5 MICROgram(s) Inhaler 2 Puff(s) Inhalation two times a day  calcitriol   Capsule 0.5 MICROGram(s) Oral daily  carbidopa/levodopa/entacapone 12.5/50/200 1 Tablet(s) Oral three times a day  heparin   Injectable 5000 Unit(s) SubCutaneous every 8 hours  insulin lispro (ADMELOG) corrective regimen sliding scale   SubCutaneous three times a day before meals  montelukast 10 milliGRAM(s) Oral at bedtime  oxybutynin XL 5 milliGRAM(s) Oral daily  rasagiline Tablet 1 milliGRAM(s) Oral daily  senna 2 Tablet(s) Oral at bedtime  sodium chloride 0.9%. 1000 milliLiter(s) (70 mL/Hr) IV Continuous <Continuous>      MEDICATIONS  (PRN):  acetaminophen   Tablet .. 650 milliGRAM(s) Oral every 6 hours PRN Temp greater or equal to 38C (100.4F), Moderate Pain (4 - 6)  ALBUTerol    90 MICROgram(s) HFA Inhaler 2 Puff(s) Inhalation every 6 hours PRN Shortness of Breath and/or Wheezing      Allergies    No Known Allergies        PAST MEDICAL & SURGICAL HISTORY:  Parkinson disease    History of COPD    Diverticulosis    HTN (hypertension)    Diabetes mellitus    HLD (hyperlipidemia)    IVC Filter  a few weeks ago  TKR B/L       FAMILY HISTORY:  HTN--    DM--   IHD--   Asthma -- COPD --    SOCIAL HISTORY SMOKING --   ETOH--     DRUGS--          REVIEW OF SYSTEMS: as per staff  CONSTITUTIONAL: No fever, weight loss, or fatigue   EYES: No eye pain, visual disturbances, or discharge  ENT:  No difficulty hearing, tinnitus, vertigo; No sinus or throat pain  NECK: No pain or stiffness   RESPIRATORY:  cough--   wheezing--   chills --  hemoptysis --   Shortness of Breath--  CARDIOVASCULAR: No chest pain, palpitations, passing out, dizziness, or leg swelling  GASTROINTESTINAL: No abdominal or epigastric pain. No nausea, vomiting,  NEUROLOGICAL: No headaches, memory loss++,  loss of strength+  SKIN: No itching, burning, rashes, or lesions   LYMPH Nodes: No enlarged glands  ENDOCRINE: No heat or cold intolerance; No hair loss  HEME/LYMPH: No easy bruising, or bleeding gums  ALLERGY AND IMMUNOLOGIC: No hives or eczema      Vital Signs Last 24 Hrs  T(C): 36.7 (24 Jun 2021 08:00), Max: 37.2 (23 Jun 2021 22:20)  T(F): 98.1 (24 Jun 2021 08:00), Max: 98.9 (23 Jun 2021 22:20)  HR: 88 (24 Jun 2021 08:00) (72 - 88)  BP: 126/56 (24 Jun 2021 08:00) (107/76 - 137/80)  BP(mean): --  RR: 20 (24 Jun 2021 08:00) (18 - 20)  SpO2: 100% (24 Jun 2021 08:00) (96% - 100%)  I&O's Detail      PHYSICAL EXAMINATION:    GENERAL: The patient is a thin femalein no apparent distress.   SKIN: No rashes ecchymoses or cyanosis  HEENT: Head is normocephalic and atraumatic. Extraocular muscles are intact. Mucous membranes are moist.   Neck supple LN not felt, JVP not increased  Thyroid not enlarged  Lymphatic: No lymphadenopathy  Cardiovascular:  S1 S2  heard ,RSR , JVP not increased , systolic  murmur at apex, No  gallop or rub  Respiratory:  Symmetrical chest wall movements Breathing vesicular , Percussion note normal no dulness   with  no  rales , no  wheeze  ABDOMEN:  Soft, Non-tender,   No  hepatosplenomegaly ,BS positive		  Extremities:  Scar B/L knees , No clubbing, cyanosis or edema , No calf tenderness  Vascular: Peripheral pulses palpable 2+ bilaterally  CNS: Awake non communicative  Mood and affect appropriate  Cranial nerves intact  sensory intact  Moving extremities   Babinski neg        LABS:                        10.4   8.63  )-----------( 308      ( 23 Jun 2021 18:10 )             31.8     06-23    138  |  104  |  31<H>  ----------------------------<  142<H>  4.3   |  26  |  1.55<H>    Ca    10.8<H>      23 Jun 2021 18:10    TPro  6.8  /  Alb  3.5  /  TBili  0.4  /  DBili  x   /  AST  39  /  ALT  10  /  AlkPhos  82  06-23        ABG - ( 24 Jun 2021 06:41 )  pH, Arterial: 7.48  pH, Blood: x     /  pCO2: 30    /  pO2: 229   / HCO3: 22    / Base Excess: -0.3  /  SaO2: 99        CARDIAC MARKERS ( 24 Jun 2021 00:32 )  0.075 ng/mL / x     / x     / x     / x      CARDIAC MARKERS ( 23 Jun 2021 18:10 )  0.096 ng/mL / x     / x     / x     / x        Serum Pro-Brain Natriuretic Peptide: 2799 pg/mL (06-23-21 @ 18:10)        RADIOLOGY & ADDITIONAL STUDIES:    CXR: Small heart, lungs clear 6/23/21        ekg; < from: 12 Lead ECG (05.18.21 @ 10:17) >  Normal sinus rhythm  Voltage criteria for left ventricular hypertrophy  Nonspecific ST abnormality      Venous Doppler Legs; < from: US Duplex Venous Lower Ext Complete, Bilateral (06.01.21 @ 14:06) >  Normal compressibility of the RIGHT common femoral vein. Noncompressible thrombi are noted in the peroneal vein, popliteal vein and lower femoralvein, compatible with deep vein thrombosis.        
Patient is a 75y old  Female who presents with a chief complaint of hypotension and hypoxia (23 Jun 2021 21:44)      HPI:  Patient, a 75 year old female from [place] with past medical history of Parkinson's Disease, COPD, diverticulosis, HTN, DM, DVT and HLD is sent to the ED from NH for hypotension and hypoxia. As per NH chart, patient sent for low blood pressure of 80/40 and hypoxia to 78% 5LNC. Patient is confused and unable to answer questions. As per chart, patient is alert, responsive and has periodic confusion at baseline. Patient was discharged only 2 days ago from [facility] after being admitted for rectal bleeding. At the time, CT A/P showed rectal wall thickening, colonic diverticulosis without diverticulitis, right adnexal mass, and DVT in LLE. Patient had an IVC filter placed due to inability to tolerate AC's in the setting of GIB. Colonoscopy with finding of rectal ulcer s/p 2 clips.  When seen at bedside, patient found to be desaturating to 70s on RA (had oxygen off). Patient placed on 5-6L NC saturating well.     GOC: DNR/DNI  (23 Jun 2021 21:44)       ROS:  Negative except for:    PAST MEDICAL & SURGICAL HISTORY:  Parkinson disease    History of COPD    Diverticulosis    HTN (hypertension)    Diabetes mellitus    HLD (hyperlipidemia)    No significant past surgical history        SOCIAL HISTORY:    FAMILY HISTORY:      MEDICATIONS  (STANDING):  aspirin  chewable 81 milliGRAM(s) Oral daily  atorvastatin 40 milliGRAM(s) Oral at bedtime  BACItracin   Ointment 1 Application(s) Topical four times a day  budesonide  80 MICROgram(s)/formoterol 4.5 MICROgram(s) Inhaler 2 Puff(s) Inhalation two times a day  calcitriol   Capsule 0.5 MICROGram(s) Oral daily  carbidopa/levodopa/entacapone 12.5/50/200 1 Tablet(s) Oral three times a day  heparin   Injectable 5000 Unit(s) SubCutaneous every 12 hours  insulin lispro (ADMELOG) corrective regimen sliding scale   SubCutaneous three times a day before meals  montelukast 10 milliGRAM(s) Oral at bedtime  oxybutynin XL 5 milliGRAM(s) Oral daily  rasagiline Tablet 1 milliGRAM(s) Oral daily  senna 2 Tablet(s) Oral at bedtime  sodium chloride 0.9%. 1000 milliLiter(s) (70 mL/Hr) IV Continuous <Continuous>    MEDICATIONS  (PRN):  acetaminophen   Tablet .. 650 milliGRAM(s) Oral every 6 hours PRN Temp greater or equal to 38C (100.4F), Moderate Pain (4 - 6)  ALBUTerol    90 MICROgram(s) HFA Inhaler 2 Puff(s) Inhalation every 6 hours PRN Shortness of Breath and/or Wheezing      Allergies    No Known Allergies    Intolerances        Vital Signs Last 24 Hrs  T(C): 36.7 (24 Jun 2021 08:00), Max: 37.2 (23 Jun 2021 22:20)  T(F): 98.1 (24 Jun 2021 08:00), Max: 98.9 (23 Jun 2021 22:20)  HR: 88 (24 Jun 2021 08:00) (72 - 88)  BP: 126/56 (24 Jun 2021 08:00) (107/76 - 137/80)  BP(mean): --  RR: 20 (24 Jun 2021 08:00) (18 - 20)  SpO2: 100% (24 Jun 2021 08:00) (96% - 100%)    PHYSICAL EXAM  General: adult in NAD  HEENT: clear oropharynx, anicteric sclera, pink conjunctiva  Neck: supple  CV: normal S1/S2 with no murmur rubs or gallops  Lungs: positive air movement b/l ant lungs,clear to auscultation, no wheezes, no rales  Abdomen: soft non-tender non-distended, no hepatosplenomegaly  Ext: no clubbing cyanosis or edema  Skin: no rashes and no petechiae  Neuro: alert and oriented X 4, no focal deficits      LABS:                          10.4   8.63  )-----------( 308      ( 23 Jun 2021 18:10 )             31.8         Mean Cell Volume : 89.8 fl  Mean Cell Hemoglobin : 29.4 pg  Mean Cell Hemoglobin Concentration : 32.7 gm/dL  Auto Neutrophil # : 6.75 K/uL  Auto Lymphocyte # : 1.17 K/uL  Auto Monocyte # : 0.51 K/uL  Auto Eosinophil # : 0.07 K/uL  Auto Basophil # : 0.04 K/uL  Auto Neutrophil % : 78.2 %  Auto Lymphocyte % : 13.6 %  Auto Monocyte % : 5.9 %  Auto Eosinophil % : 0.8 %  Auto Basophil % : 0.5 %      Serial CBC's  06-23 @ 18:10  Hct-31.8 / Hgb-10.4 / Plat-308 / RBC-3.54 / WBC-8.63  Serial CBC's  06-21 @ 06:31  Hct-30.2 / Hgb-9.6 / Plat-308 / RBC-3.36 / WBC-5.20      06-23    138  |  104  |  31<H>  ----------------------------<  142<H>  4.3   |  26  |  1.55<H>    Ca    10.8<H>      23 Jun 2021 18:10    TPro  6.8  /  Alb  3.5  /  TBili  0.4  /  DBili  x   /  AST  39  /  ALT  10  /  AlkPhos  82  06-23                      BLOOD SMEAR INTERPRETATION:       RADIOLOGY & ADDITIONAL STUDIES:

## 2021-06-25 NOTE — PROGRESS NOTE ADULT - PROBLEM SELECTOR PLAN 5
C/w with  HSS  Fingerstick before meals and at bedtime  DASH diet with consistent carb  Target -180  A1c 5.2  Patient is DNR DNI COMFORT MEASURES ONLY, NO LAB DRAWS

## 2021-06-26 LAB
GLUCOSE BLDC GLUCOMTR-MCNC: 132 MG/DL — HIGH (ref 70–99)
GLUCOSE BLDC GLUCOMTR-MCNC: 139 MG/DL — HIGH (ref 70–99)
GLUCOSE BLDC GLUCOMTR-MCNC: 140 MG/DL — HIGH (ref 70–99)
GLUCOSE BLDC GLUCOMTR-MCNC: 186 MG/DL — HIGH (ref 70–99)

## 2021-06-26 RX ORDER — MORPHINE SULFATE 50 MG/1
0.5 CAPSULE, EXTENDED RELEASE ORAL EVERY 8 HOURS
Refills: 0 | Status: DISCONTINUED | OUTPATIENT
Start: 2021-06-26 | End: 2021-06-28

## 2021-06-26 RX ADMIN — ATORVASTATIN CALCIUM 40 MILLIGRAM(S): 80 TABLET, FILM COATED ORAL at 22:12

## 2021-06-26 RX ADMIN — Medication 1: at 07:47

## 2021-06-26 RX ADMIN — CARBIDOPA, LEVODOPA, AND ENTACAPONE 1 TABLET(S): 50; 200; 200 TABLET, FILM COATED ORAL at 22:12

## 2021-06-26 RX ADMIN — Medication 81 MILLIGRAM(S): at 13:14

## 2021-06-26 RX ADMIN — SENNA PLUS 2 TABLET(S): 8.6 TABLET ORAL at 22:12

## 2021-06-26 RX ADMIN — Medication 1 APPLICATION(S): at 13:14

## 2021-06-26 RX ADMIN — CARBIDOPA, LEVODOPA, AND ENTACAPONE 1 TABLET(S): 50; 200; 200 TABLET, FILM COATED ORAL at 13:18

## 2021-06-26 RX ADMIN — MONTELUKAST 10 MILLIGRAM(S): 4 TABLET, CHEWABLE ORAL at 22:12

## 2021-06-26 RX ADMIN — Medication 5 MILLIGRAM(S): at 13:19

## 2021-06-26 RX ADMIN — BUDESONIDE AND FORMOTEROL FUMARATE DIHYDRATE 2 PUFF(S): 160; 4.5 AEROSOL RESPIRATORY (INHALATION) at 13:16

## 2021-06-26 RX ADMIN — BUDESONIDE AND FORMOTEROL FUMARATE DIHYDRATE 2 PUFF(S): 160; 4.5 AEROSOL RESPIRATORY (INHALATION) at 22:12

## 2021-06-26 RX ADMIN — CARBIDOPA, LEVODOPA, AND ENTACAPONE 1 TABLET(S): 50; 200; 200 TABLET, FILM COATED ORAL at 06:10

## 2021-06-26 RX ADMIN — Medication 1 APPLICATION(S): at 06:10

## 2021-06-26 RX ADMIN — CALCITRIOL 0.5 MICROGRAM(S): 0.5 CAPSULE ORAL at 13:14

## 2021-06-26 RX ADMIN — RASAGILINE 1 MILLIGRAM(S): 0.5 TABLET ORAL at 13:19

## 2021-06-26 RX ADMIN — APIXABAN 10 MILLIGRAM(S): 2.5 TABLET, FILM COATED ORAL at 06:10

## 2021-06-26 NOTE — PROGRESS NOTE ADULT - PROBLEM SELECTOR PLAN 1
Patient presented with hypoxia and sob, hx of DVT s/p IVC filter   CXR clear   VQ scan + for PE   D dimer 3k  S/p Heparin drip  C/w Eliquis 10 mg BID x 7 then 5 mg BID  Patient is DNR DNI COMFORT MEASURES ONLY, NO LAB DRAWS

## 2021-06-26 NOTE — PROGRESS NOTE ADULT - SUBJECTIVE AND OBJECTIVE BOX
PGY-1 Progress Note discussed with attending    PAGER #: [[phone]] TILL 5:00 PM  PLEASE CONTACT ON CALL TEAM:  - On Call Team (Please refer to Kane) FROM 5:00 PM - 8:30PM  - Nightfloat Team FROM 8:30 -7:30 AM    CHIEF COMPLAINT & BRIEF HOSPITAL COURSE:  75 year old female, alert and confused at baseline, from [place] with past medical history of Parkinson's Disease, COPD, diverticulosis, HTN, DM, DVT and HLD is sent to the ED from NH for hypotension and hypoxia to 78% on 5L NC. Of note patient was recently admitted for rectal bleeding, CT A/P showed rectal wall thickening, colonic diverticulosis without diverticulitis, right adnexal mass and DVT in LLE, IVC filter placed due to inability to tolerate AC's in the setting of GIB. Colonoscopy done which showed rectal ulcer s/p 2 clips.  Patient admitted for acute hypoxic respiratory failure 2/2 PE, CXR is clear, VQ scan showed PE, on supplemental oxygen via NC, started on heparin drip now transitioned to Eliquis for anticoagulation   For COPD, on Duoneb, Albuterol, Symbicort and s/p Solumedrol IV  For HTN, holding meds as patient is low normal  For Parkinson continued to home meds Levodopa/Carbidopa/Entacapone  For GREG, on IVF gentle hydration, For, DM on HSS  Patient is DNR DNI COMFORT MEASURES ONLY, NO LAB DRAWS     INTERVAL HPI/OVERNIGHT EVENTS: patient refers feeling fine, denies sob or chest pain, sat well on RA, on Tylenol and Morphine for pain       MEDICATIONS  (STANDING):  apixaban 10 milliGRAM(s) Oral two times a day  aspirin  chewable 81 milliGRAM(s) Oral daily  atorvastatin 40 milliGRAM(s) Oral at bedtime  BACItracin   Ointment 1 Application(s) Topical four times a day  budesonide  80 MICROgram(s)/formoterol 4.5 MICROgram(s) Inhaler 2 Puff(s) Inhalation two times a day  calcitriol   Capsule 0.5 MICROGram(s) Oral daily  carbidopa/levodopa/entacapone 12.5/50/200 1 Tablet(s) Oral three times a day  insulin lispro (ADMELOG) corrective regimen sliding scale   SubCutaneous three times a day before meals  montelukast 10 milliGRAM(s) Oral at bedtime  oxybutynin XL 5 milliGRAM(s) Oral daily  rasagiline Tablet 1 milliGRAM(s) Oral daily  senna 2 Tablet(s) Oral at bedtime  sodium chloride 0.9%. 1000 milliLiter(s) (70 mL/Hr) IV Continuous <Continuous>    MEDICATIONS  (PRN):  acetaminophen   Tablet .. 650 milliGRAM(s) Oral every 6 hours PRN Temp greater or equal to 38C (100.4F), Moderate Pain (4 - 6)  ALBUTerol    90 MICROgram(s) HFA Inhaler 2 Puff(s) Inhalation every 6 hours PRN Shortness of Breath and/or Wheezing      Vital Signs Last 24 Hrs  T(C): 36.9 (26 Jun 2021 11:07), Max: 37.1 (26 Jun 2021 04:33)  T(F): 98.4 (26 Jun 2021 11:07), Max: 98.8 (26 Jun 2021 04:33)  HR: 77 (26 Jun 2021 11:07) (68 - 77)  BP: 128/90 (26 Jun 2021 11:07) (112/64 - 146/69)  BP(mean): --  RR: 18 (26 Jun 2021 11:07) (18 - 18)  SpO2: 98% (26 Jun 2021 11:07) (98% - 100%)    PHYSICAL EXAMINATION:  GENERAL: NAD, deconditioned, on NC, pale, poor nutrition and appetite   HEAD:  Atraumatic, Normocephalic  EYES:  conjunctiva and sclera clear  NECK: Supple, No JVD, Normal thyroid  CHEST/LUNG: Clear to auscultation. Clear to percussion bilaterally; No rales, rhonchi, wheezing, or rubs  HEART: Regular rate and rhythm; No murmurs, rubs, or gallops  ABDOMEN: Soft, Nontender, Nondistended; Bowel sounds present, no pain or masses on palpation  NERVOUS SYSTEM:  Alert & Oriented X1-2, unable to actively move LE, however UE with conserved strength, very pleasant  : incontinent   EXTREMITIES:  2+ Peripheral Pulses, No clubbing, cyanosis, or edema, contracted, scars on both knee from knee surgery    SKIN: warm dry                        8.8    7.41  )-----------( 267      ( 25 Jun 2021 10:28 )             28.3     06-25    142  |  115<H>  |  34<H>  ----------------------------<  126<H>  4.9   |  27  |  0.98    Ca    9.7      25 Jun 2021 07:41  Phos  2.7     06-25  Mg     2.0     06-25            PT/INR - ( 24 Jun 2021 13:01 )   PT: 12.4 sec;   INR: 1.04 ratio         PTT - ( 25 Jun 2021 10:28 )  PTT:>200.0 sec    I&O's Summary        Culture - Blood (collected 24 Jun 2021 13:38)  Source: .Blood Blood  Preliminary Report (25 Jun 2021 14:01):    No growth to date.

## 2021-06-26 NOTE — PROGRESS NOTE ADULT - SUBJECTIVE AND OBJECTIVE BOX
PULMONARY  progress note    HEMA NAJERA  MRN-[id number]    Patient is a 75y old  Female who presents with a chief complaint of hypotension and hypoxia (25 Jun 2021 15:35)  Feels and looks better, no sob       MEDICATIONS  (STANDING):  apixaban 10 milliGRAM(s) Oral two times a day  aspirin  chewable 81 milliGRAM(s) Oral daily  atorvastatin 40 milliGRAM(s) Oral at bedtime  BACItracin   Ointment 1 Application(s) Topical four times a day  budesonide  80 MICROgram(s)/formoterol 4.5 MICROgram(s) Inhaler 2 Puff(s) Inhalation two times a day  calcitriol   Capsule 0.5 MICROGram(s) Oral daily  carbidopa/levodopa/entacapone 12.5/50/200 1 Tablet(s) Oral three times a day  insulin lispro (ADMELOG) corrective regimen sliding scale   SubCutaneous three times a day before meals  montelukast 10 milliGRAM(s) Oral at bedtime  oxybutynin XL 5 milliGRAM(s) Oral daily  rasagiline Tablet 1 milliGRAM(s) Oral daily  senna 2 Tablet(s) Oral at bedtime  sodium chloride 0.9%. 1000 milliLiter(s) (70 mL/Hr) IV Continuous <Continuous>      MEDICATIONS  (PRN):  acetaminophen   Tablet .. 650 milliGRAM(s) Oral every 6 hours PRN Temp greater or equal to 38C (100.4F), Moderate Pain (4 - 6)  ALBUTerol    90 MICROgram(s) HFA Inhaler 2 Puff(s) Inhalation every 6 hours PRN Shortness of Breath and/or Wheezing      Allergies    No Known Allergies    Intolerances        PAST MEDICAL & SURGICAL HISTORY:  Parkinson disease    History of COPD    Diverticulosis    HTN (hypertension)    Diabetes mellitus    HLD (hyperlipidemia)    No significant past surgical history             REVIEW OF SYSTEMS:  CONSTITUTIONAL: No fever, weight loss, or fatigue   EYES: No eye pain, visual disturbances, or discharge  ENT:  No difficulty hearing, tinnitus, vertigo; No sinus or throat pain  NECK: No pain or stiffness or nodes  RESPIRATORY:  cough--   wheezing --  chills --  hemoptysis--  Shortness of Breath--  CARDIOVASCULAR: No chest pain, palpitations, passing out, dizziness, or leg swelling  GASTROINTESTINAL: No abdominal or epigastric pain. No nausea, vomiting, or hematemesis;  GENITOURINARY: No dysuria, frequency, hematuria, or incontinence  SKIN: No itching, burning, rashes, or lesions   LYMPH Nodes: No enlarged glands  ENDOCRINE: No heat or cold intolerance; No hair loss  MUSCULOSKELETAL: No joint pain or swelling; No muscle, back, or extremity pain  HEME/LYMPH: No easy bruising, or bleeding gums  ALLERGY AND IMMUNOLOGIC: No hives or eczema        Vital Signs Last 24 Hrs  T(C): 36.9 (26 Jun 2021 07:28), Max: 37.1 (26 Jun 2021 04:33)  T(F): 98.4 (26 Jun 2021 07:28), Max: 98.8 (26 Jun 2021 04:33)  HR: 72 (26 Jun 2021 07:28) (68 - 76)  BP: 125/65 (26 Jun 2021 07:28) (112/64 - 146/69)  BP(mean): --  RR: 18 (26 Jun 2021 07:28) (18 - 18)  SpO2: 99% (26 Jun 2021 07:28) (99% - 100%)  I&O's Detail      PHYSICAL EXAMINATION:    GENERAL: The patient is a thin female in no apparent distress.   SKIN no rash ecchymoses or bruises  HEENT: Head is normocephalic and atraumatic  DORINA , Extraocular muscles are intact. Mucous membranes  moist.   Neck supple ,No LN felt JVP not increased  Thyroid not enlarged  Cardiovascular:  S1 S2 heard, RSR, No JVD , systolic  murmur at apex, No gallop or rub  Respiratory: Chest wall symmetrical with good air entry ,Percussion note normal,    Lungs vesicular breathing with  no  rales  or  wheeze	  ABDOMEN:  Soft, Non-tender,  no hepatomegaly or splenomegaly BS positive	  Extremities: , No clubbing, cyanosis or edema  Vascular: Peripheral pulses palpable 2+ bilaterally  CNS:  Alert and responsive, masked facies   Cranial nerves intact  sensory intact  motor power5/5  dtr 2+   Babinski neg    LABS:                        8.8    7.41  )-----------( 267      ( 25 Jun 2021 10:28 )             28.3     06-25    142  |  115<H>  |  34<H>  ----------------------------<  126<H>  4.9   |  27  |  0.98    Ca    9.7      25 Jun 2021 07:41  Phos  2.7     06-25  Mg     2.0     06-25    TPro  6.0  /  Alb  3.0<L>  /  TBili  0.3  /  DBili  x   /  AST  23  /  ALT  17  /  AlkPhos  65  06-24    PT/INR - ( 24 Jun 2021 13:01 )   PT: 12.4 sec;   INR: 1.04 ratio         PTT - ( 25 Jun 2021 10:28 )  PTT:>200.0 sec            Serum Pro-Brain Natriuretic Peptide: 2799 pg/mL (06-23-21 @ 18:10)  Procalcitonin, Serum: 0.08 ng/mL (06-24-21 @ 15:03)  Vitamin B12, Serum: 448 pg/mL (06-24-21 @ 14:07)      MICROBIOLOGY:    Culture - Blood (collected 06-24-21 @ 13:38)  Source: .Blood Blood  Preliminary Report (06-25-21 @ 14:01):    No growth to date.          RADIOLOGY & ADDITIONAL STUDIES:    CXR:    ECHO:

## 2021-06-26 NOTE — PROGRESS NOTE ADULT - SUBJECTIVE AND OBJECTIVE BOX
CHIEF COMPLAINT & BRIEF HOSPITAL COURSE:  75 year old female, alert and confused at baseline, from [place] with past medical history of Parkinson's Disease, COPD, diverticulosis, HTN, DM, DVT and HLD is sent to the ED from NH for hypotension and hypoxia to 78% on 5L NC. Of note patient was recently admitted for rectal bleeding, CT A/P showed rectal wall thickening, colonic diverticulosis without diverticulitis, right adnexal mass and DVT in LLE, IVC filter placed due to inability to tolerate AC's in the setting of GIB. Colonoscopy done which showed rectal ulcer s/p 2 clips.  Patient admitted for acute hypoxic respiratory failure 2/2 PE, CXR is clear, VQ scan showed PE, on supplemental oxygen via NC, started on heparin drip now transitioned to Eliquis for anticoagulation   For COPD, on Duoneb, Albuterol, Symbicort and s/p Solumedrol IV  For HTN, holding meds as patient is low normal  For Parkinson continued to home meds Levodopa/Carbidopa/Entacapone  For GREG, on IVF gentle hydration, For, DM on HSS  Patient is DNR DNI COMFORT MEASURES ONLY, NO LAB DRAWS     INTERVAL HPI/OVERNIGHT EVENTS: patient refers feeling fine, denies sob or chest pain, sat well on RA, on Tylenol and Morphine for pain       MEDICATIONS  (STANDING):  apixaban 10 milliGRAM(s) Oral two times a day  aspirin  chewable 81 milliGRAM(s) Oral daily  atorvastatin 40 milliGRAM(s) Oral at bedtime  BACItracin   Ointment 1 Application(s) Topical four times a day  budesonide  80 MICROgram(s)/formoterol 4.5 MICROgram(s) Inhaler 2 Puff(s) Inhalation two times a day  calcitriol   Capsule 0.5 MICROGram(s) Oral daily  carbidopa/levodopa/entacapone 12.5/50/200 1 Tablet(s) Oral three times a day  insulin lispro (ADMELOG) corrective regimen sliding scale   SubCutaneous three times a day before meals  montelukast 10 milliGRAM(s) Oral at bedtime  oxybutynin XL 5 milliGRAM(s) Oral daily  rasagiline Tablet 1 milliGRAM(s) Oral daily  senna 2 Tablet(s) Oral at bedtime  sodium chloride 0.9%. 1000 milliLiter(s) (70 mL/Hr) IV Continuous <Continuous>    MEDICATIONS  (PRN):  acetaminophen   Tablet .. 650 milliGRAM(s) Oral every 6 hours PRN Temp greater or equal to 38C (100.4F), Moderate Pain (4 - 6)  ALBUTerol    90 MICROgram(s) HFA Inhaler 2 Puff(s) Inhalation every 6 hours PRN Shortness of Breath and/or Wheezing    Vital Signs Last 24 Hrs  T(C): 36.9 (26 Jun 2021 11:07), Max: 37.1 (26 Jun 2021 04:33)  T(F): 98.4 (26 Jun 2021 11:07), Max: 98.8 (26 Jun 2021 04:33)  HR: 77 (26 Jun 2021 11:07) (68 - 77)  BP: 128/90 (26 Jun 2021 11:07) (112/64 - 146/69)  BP(mean): --  RR: 18 (26 Jun 2021 11:07) (18 - 18)  SpO2: 98% (26 Jun 2021 11:07) (98% - 100%)    PHYSICAL EXAMINATION:  GENERAL: NAD, deconditioned, on NC, pale, poor nutrition and appetite   HEAD:  Atraumatic, Normocephalic  EYES:  conjunctiva and sclera clear  NECK: Supple, No JVD, Normal thyroid  CHEST/LUNG: Clear to auscultation. Clear to percussion bilaterally; No rales, rhonchi, wheezing, or rubs  HEART: Regular rate and rhythm; No murmurs, rubs, or gallops  ABDOMEN: Soft, Nontender, Nondistended; Bowel sounds present, no pain or masses on palpation  NERVOUS SYSTEM:  Alert & Oriented X1-2, unable to actively move LE, however UE with conserved strength, very pleasant  : incontinent   EXTREMITIES:  2+ Peripheral Pulses, No clubbing, cyanosis, or edema, contracted, scars on both knee from knee surgery    SKIN: warm dry                        8.8    7.41  )-----------( 267      ( 25 Jun 2021 10:28 )             28.3     06-25    142  |  115<H>  |  34<H>  ----------------------------<  126<H>  4.9   |  27  |  0.98    Ca    9.7      25 Jun 2021 07:41  Phos  2.7     06-25  Mg     2.0     06-25            PT/INR - ( 24 Jun 2021 13:01 )   PT: 12.4 sec;   INR: 1.04 ratio         PTT - ( 25 Jun 2021 10:28 )  PTT:>200.0 sec    I&O's Summary        Culture - Blood (collected 24 Jun 2021 13:38)  Source: .Blood Blood  Preliminary Report (25 Jun 2021 14:01):    No growth to date.

## 2021-06-26 NOTE — PROGRESS NOTE ADULT - PROBLEM SELECTOR PLAN 2
Patient with hypoxia   CXR clear   On supplemental O2  V/Q scan + for PE, D dimer 3k  S/p Heparin drip  C/w Eliquis 10 mg BID x 7 then 5 mg qd  Aspiration precautions   Pulm Dr Trent  Heme onc Dr Yan  Patient is DNR DNI COMFORT MEASURES ONLY, NO LAB DRAWS

## 2021-06-27 LAB
GLUCOSE BLDC GLUCOMTR-MCNC: 125 MG/DL — HIGH (ref 70–99)
GLUCOSE BLDC GLUCOMTR-MCNC: 147 MG/DL — HIGH (ref 70–99)
GLUCOSE BLDC GLUCOMTR-MCNC: 162 MG/DL — HIGH (ref 70–99)
GLUCOSE BLDC GLUCOMTR-MCNC: 177 MG/DL — HIGH (ref 70–99)

## 2021-06-27 RX ORDER — ACETAMINOPHEN 500 MG
650 TABLET ORAL ONCE
Refills: 0 | Status: COMPLETED | OUTPATIENT
Start: 2021-06-27 | End: 2021-06-27

## 2021-06-27 RX ADMIN — Medication 1 APPLICATION(S): at 11:27

## 2021-06-27 RX ADMIN — APIXABAN 10 MILLIGRAM(S): 2.5 TABLET, FILM COATED ORAL at 06:07

## 2021-06-27 RX ADMIN — Medication 1: at 07:49

## 2021-06-27 RX ADMIN — Medication 5 MILLIGRAM(S): at 11:27

## 2021-06-27 RX ADMIN — Medication 81 MILLIGRAM(S): at 11:27

## 2021-06-27 RX ADMIN — Medication 650 MILLIGRAM(S): at 17:03

## 2021-06-27 RX ADMIN — CARBIDOPA, LEVODOPA, AND ENTACAPONE 1 TABLET(S): 50; 200; 200 TABLET, FILM COATED ORAL at 13:11

## 2021-06-27 RX ADMIN — Medication 650 MILLIGRAM(S): at 15:49

## 2021-06-27 RX ADMIN — Medication 1 APPLICATION(S): at 06:13

## 2021-06-27 RX ADMIN — BUDESONIDE AND FORMOTEROL FUMARATE DIHYDRATE 2 PUFF(S): 160; 4.5 AEROSOL RESPIRATORY (INHALATION) at 11:27

## 2021-06-27 RX ADMIN — CARBIDOPA, LEVODOPA, AND ENTACAPONE 1 TABLET(S): 50; 200; 200 TABLET, FILM COATED ORAL at 06:07

## 2021-06-27 RX ADMIN — CARBIDOPA, LEVODOPA, AND ENTACAPONE 1 TABLET(S): 50; 200; 200 TABLET, FILM COATED ORAL at 21:03

## 2021-06-27 RX ADMIN — SENNA PLUS 2 TABLET(S): 8.6 TABLET ORAL at 21:04

## 2021-06-27 RX ADMIN — ATORVASTATIN CALCIUM 40 MILLIGRAM(S): 80 TABLET, FILM COATED ORAL at 21:03

## 2021-06-27 RX ADMIN — BUDESONIDE AND FORMOTEROL FUMARATE DIHYDRATE 2 PUFF(S): 160; 4.5 AEROSOL RESPIRATORY (INHALATION) at 21:04

## 2021-06-27 RX ADMIN — MONTELUKAST 10 MILLIGRAM(S): 4 TABLET, CHEWABLE ORAL at 21:03

## 2021-06-27 RX ADMIN — Medication 1: at 17:04

## 2021-06-27 RX ADMIN — RASAGILINE 1 MILLIGRAM(S): 0.5 TABLET ORAL at 11:26

## 2021-06-27 RX ADMIN — APIXABAN 10 MILLIGRAM(S): 2.5 TABLET, FILM COATED ORAL at 17:04

## 2021-06-27 RX ADMIN — Medication 1 APPLICATION(S): at 17:04

## 2021-06-27 NOTE — DIETITIAN INITIAL EVALUATION ADULT. - PERTINENT LABORATORY DATA
06-25 Na142 mmol/L Glu 126 mg/dL<H> K+ 4.9 mmol/L Cr  0.98 mg/dL BUN 34 mg/dL<H>   06-25 Phos 2.7 mg/dL   06-24 Alb 3.0 g/dL<L>       06-24 Chol 231 mg/dL<H> LDL --    HDL 50 mg/dL<L> Trig 73 mg/dL  06-24-21 @ 13:49 HgbA1C 5.2 [4.0 - 5.6]  06-02-21 @ 09:38 HgbA1C 5.7 [4.0 - 5.6]

## 2021-06-27 NOTE — PROGRESS NOTE ADULT - SUBJECTIVE AND OBJECTIVE BOX
patient was seen and examined   s no c/o offered   INTERVAL HPI/OVERNIGHT EVENTS: patient refers feeling fine, denies sob or chest pain, sat well on RA, on Tylenol and Morphine for pain   MEDICATIONS  (STANDING):  apixaban 10 milliGRAM(s) Oral two times a day  aspirin  chewable 81 milliGRAM(s) Oral daily  atorvastatin 40 milliGRAM(s) Oral at bedtime  BACItracin   Ointment 1 Application(s) Topical four times a day  budesonide  80 MICROgram(s)/formoterol 4.5 MICROgram(s) Inhaler 2 Puff(s) Inhalation two times a day  calcitriol   Capsule 0.5 MICROGram(s) Oral daily  carbidopa/levodopa/entacapone 12.5/50/200 1 Tablet(s) Oral three times a day  insulin lispro (ADMELOG) corrective regimen sliding scale   SubCutaneous three times a day before meals  montelukast 10 milliGRAM(s) Oral at bedtime  oxybutynin XL 5 milliGRAM(s) Oral daily  rasagiline Tablet 1 milliGRAM(s) Oral daily  senna 2 Tablet(s) Oral at bedtime  sodium chloride 0.9%. 1000 milliLiter(s) (70 mL/Hr) IV Continuous <Continuous>    MEDICATIONS  (PRN):    etaminophen   Tablet .. 650 milliGRAM(s) Oral every 6 hours PRN Temp greater or equal to 38C (100.4F), Moderate Pain (4 - 6)  ALBUTerol    90 MICROgram(s) HFA Inhaler 2 Puff(s) Inhalation every 6 hours PRN Shortness of Breath and/or Wheezing  morphine  - Injectable 0.5 milliGRAM(s) IV Push every 8 hours PRN Severe Pain (7 - 10)  Vital Signs Last 24 Hrs  T(C): 36.6 (27 Jun 2021 19:39), Max: 36.6 (27 Jun 2021 19:39)  T(F): 97.9 (27 Jun 2021 19:39), Max: 97.9 (27 Jun 2021 19:39)  HR: 71 (27 Jun 2021 19:39) (70 - 83)  BP: 148/77 (27 Jun 2021 19:39) (116/61 - 148/77)  BP(mean): --  RR: 18 (27 Jun 2021 19:39) (18 - 18)  SpO2: 100% (27 Jun 2021 19:39) (100% - 100%)    PHYSICAL EXAMINATION:  GENERAL: NAD, deconditioned, on NC, pale, poor nutrition and appetite   HEAD:  Atraumatic, Normocephalic  EYES:  conjunctiva and sclera clear  NECK: Supple, No JVD, Normal thyroid  CHEST/LUNG: Clear to auscultation. Clear to percussion bilaterally; No rales, rhonchi, wheezing, or rubs  HEART: Regular rate and rhythm; No murmurs, rubs, or gallops  ABDOMEN: Soft, Nontender, Nondistended; Bowel sounds present, no pain or masses on palpation  NERVOUS SYSTEM:  Alert & Oriented X1-2, unable to actively move LE, however UE with conserved strength, very pleasant  : incontinent   EXTREMITIES:  2+ Peripheral Pulses, No clubbing, cyanosis, or edema, contracted, scars on both knee from knee surgery    SKIN: warm dry  LABS:                            8.8    7.41  )-----------( 267      ( 25 Jun 2021 10:28 )             28.3     06-25    142  |  115<H>  |  34<H>  ----------------------------<  126<H>  4.9   |  27  |  0.98    Ca    9.7      25 Jun 2021 07:41  Phos  2.7     06-25  Mg     2.0     06-25            PT/INR - ( 24 Jun 2021 13:01 )   PT: 12.4 sec;   INR: 1.04 ratio         PTT - ( 25 Jun 2021 10:28 )  PTT:>200.0 sec    I&O's Summary        Culture - Blood (collected 24 Jun 2021 13:38)  Source: .Blood Blood  Preliminary Report (25 Jun 2021 14:01):    No growth to date.

## 2021-06-27 NOTE — DIETITIAN INITIAL EVALUATION ADULT. - PROBLEM SELECTOR PLAN 1
I was informed earlier this week that Dr. James had d/c'd these after pt's recent surgery, so they are not to be refilled.  Thanks.   pt reportedly sent by NH for hypoxia  CXR looks clear  pt currently saturating 100% on 10L NRB  concern for PE given sudden onset of hypoxia  PESI [id number] points -class V- very high risk 10-24.5% 30 day mortality in this group  not given FDA due to recent GIB, CTA could not be done due to kidney function  f/u V/Q scan  f/u D dimers  Pulm Dr Trent  Heme Dr Yan

## 2021-06-27 NOTE — PROGRESS NOTE ADULT - SUBJECTIVE AND OBJECTIVE BOX
PULMONARY  progress note    HEMA NAJERA  MRN-[id number]    Patient is a 75y old  Female who presents with a chief complaint of hypotension and hypoxia (26 Jun 2021 14:25)  No cough or sob, Anxious to go home      MEDICATIONS  (STANDING):  apixaban 10 milliGRAM(s) Oral two times a day  aspirin  chewable 81 milliGRAM(s) Oral daily  atorvastatin 40 milliGRAM(s) Oral at bedtime  BACItracin   Ointment 1 Application(s) Topical four times a day  budesonide  80 MICROgram(s)/formoterol 4.5 MICROgram(s) Inhaler 2 Puff(s) Inhalation two times a day  calcitriol   Capsule 0.5 MICROGram(s) Oral daily  carbidopa/levodopa/entacapone 12.5/50/200 1 Tablet(s) Oral three times a day  insulin lispro (ADMELOG) corrective regimen sliding scale   SubCutaneous three times a day before meals  montelukast 10 milliGRAM(s) Oral at bedtime  oxybutynin XL 5 milliGRAM(s) Oral daily  rasagiline Tablet 1 milliGRAM(s) Oral daily  senna 2 Tablet(s) Oral at bedtime  sodium chloride 0.9%. 1000 milliLiter(s) (70 mL/Hr) IV Continuous <Continuous>      Allergies    No Known Allergies            PAST MEDICAL & SURGICAL HISTORY:  Parkinson disease    History of COPD    Diverticulosis    HTN (hypertension)    Diabetes mellitus    HLD (hyperlipidemia)                 REVIEW OF SYSTEMS:  CONSTITUTIONAL: No fever, weight loss, or fatigue   EYES: No eye pain, visual disturbances, or discharge  ENT:  No difficulty hearing, tinnitus, vertigo; No sinus or throat pain  NECK: No pain or stiffness or nodes  RESPIRATORY:  cough --  wheezing --  chills--   hemoptysis--  Shortness of Breath--  CARDIOVASCULAR: No chest pain, palpitations, passing out, dizziness, or leg swelling  GASTROINTESTINAL: No abdominal or epigastric pain. No nausea, vomiting , melena or hematochezia.  GENITOURINARY: No dysuria, frequency, hematuria, or incontinence  NEUROLOGICAL: No headaches, memory loss, loss of strength, numbness but  tremors  SKIN: No itching, burning, rashes, or lesions   LYMPH Nodes: No enlarged glands  ENDOCRINE: No heat or cold intolerance; No hair loss  HEME/LYMPH: No easy bruising, or bleeding gums  ALLERGY AND IMMUNOLOGIC: No hives or eczema    Vital Signs Last 24 Hrs  T(C): 36.3 (27 Jun 2021 11:16), Max: 37.2 (26 Jun 2021 19:47)  T(F): 97.4 (27 Jun 2021 11:16), Max: 98.9 (26 Jun 2021 19:47)  HR: 70 (27 Jun 2021 11:16) (70 - 89)  BP: 127/71 (27 Jun 2021 11:16) (113/73 - 143/75)  BP(mean): --  RR: 18 (27 Jun 2021 11:16) (18 - 18)  SpO2: 100% (27 Jun 2021 11:16) (100% - 100%)  I&O's Detail    26 Jun 2021 07:01  -  27 Jun 2021 07:00  --------------------------------------------------------  IN:    Oral Fluid: 400 mL  Total IN: 400 mL    OUT:  Total OUT: 0 mL    Total NET: 400 mL      27 Jun 2021 07:01  -  27 Jun 2021 13:42  --------------------------------------------------------  IN:    Oral Fluid: 400 mL  Total IN: 400 mL    OUT:  Total OUT: 0 mL    Total NET: 400 mL          PHYSICAL EXAMINATION:    GENERAL: The patient is a thin h/f in no apparent distress.   SKIN no rash ecchymoses or bruises  HEENT: Head is normocephalic and atraumatic  DORINA , Extraocular muscles are intact. Mucous membranes  moist.   Neck supple ,No LN felt JVP not increased  Thyroid not enlarged  Cardiovascular:  S1 S2 heard, RSR, No JVD , systolic  murmur at apex, No gallop or rub  Respiratory: Chest wall symmetrical with good air entry ,Percussion note normal,    Lungs vesicular breathing with no   rales  or  wheeze	  ABDOMEN:  Soft, Non-tender,  no hepatomegaly or splenomegaly BS positive	  Extremities:  No clubbing, cyanosis or edema  Vascular: Peripheral pulses palpable 2+ bilaterally  CNS:  Alert and oriented x 2   Cranial nerves intact  sensory intact  motor rogelio r5/5  dtr 2+   Babinski neg    LABS:    Procalcitonin, Serum: 0.08 ng/mL (06-24-21 @ 15:03)      Vitamin B12, Serum: 448 pg/mL (06-24-21 @ 14:07)      MICROBIOLOGY:    Culture - Blood (collected 06-24-21 @ 13:38)  Source: .Blood Blood  Preliminary Report (06-25-21 @ 14:01):    No growth to date.

## 2021-06-27 NOTE — DIETITIAN INITIAL EVALUATION ADULT. - OTHER INFO
Date of Service: 12/28/2018    HISTORY OF PRESENT ILLNESS:  This is a very pleasant 39-year-old male. Unfortunately, he slipped and fell, landing across his right elbow and forearm area, date of injury 12/23/2018. He was seen through an urgent care, x-rays demonstrating a nondisplaced right radial head fracture. He presents today with a sling in place, indicating that his elbow is still sore, but maybe just a little bit improved. PHYSICAL EXAMINATION:    He is bruised and swollen across the lateral aspect of his elbow with tenderness at that fracture site, tolerating a range today from just slightly beyond 90 of flexion to about -40. Pronation and supination are also restricted to about 60 degrees apiece beyond neutral.  His wrist is minimally sore, but almost has a full range of motion, and he has an otherwise normal neurovascular exam.    Reviewed x-rays with Brian Oliva today. He understands the very stable nature of the fracture and the fact that this is a fracture that we do not cast.  He is allowed range of motion outside of his sling as he tolerates, and otherwise restricting lifting to less than a pound or 2  over the next several weeks. He currently is working in construction; they are framing a house. He will be off of work until reevaluated in 2 weeks. Dictated By: Migel Alejo MD  Signing Provider: Migel Sagastume.  MD RAVI Taylor/luh ([id number])  DD: 12/28/2018 10:08:37 TD: 12/28/2018 15:06:55    Copy Sent To: Pt visited. Pt is on O2.   Daughter and  at bedside. Per Daughter Pt with NKFA. No swallowing  difficulties Reported. Pt is From NH Diet - [facility], [facility] soft Thin liquids. Pt eating ~25 % of meal. Fed by staff. Pt is   DNR /DNI. . Comfort Measure only . Agreed to try Nutritional supplement.

## 2021-06-27 NOTE — DIETITIAN INITIAL EVALUATION ADULT. - PERTINENT MEDS FT
MEDICATIONS:  acetaminophen   Tablet .. 650 every 6 hours PRN  ALBUTerol    90 MICROgram(s) HFA Inhaler 2 every 6 hours PRN  apixaban 10 two times a day  aspirin  chewable 81 daily  atorvastatin 40 at bedtime  BACItracin   Ointment 1 four times a day  budesonide  80 MICROgram(s)/formoterol 4.5 MICROgram(s) Inhaler 2 two times a day  calcitriol   Capsule 0.5 daily  carbidopa/levodopa/entacapone 12.5/50/200 1 three times a day  insulin lispro (ADMELOG) corrective regimen sliding scale  three times a day before meals  montelukast 10 at bedtime  morphine  - Injectable 0.5 every 8 hours PRN  oxybutynin XL 5 daily  rasagiline Tablet 1 daily  senna 2 at bedtime  sodium chloride 0.9%. 1000 <Continuous>

## 2021-06-27 NOTE — DIETITIAN INITIAL EVALUATION ADULT. - PROBLEM SELECTOR PLAN 5
s/p duoneb and solumedrol in ED  c/w albuterol  c/w symbicort  c/w singulair  no steroids as pt not wheezing

## 2021-06-28 ENCOUNTER — TRANSCRIPTION ENCOUNTER (OUTPATIENT)
Age: 76
End: 2021-06-28

## 2021-06-28 VITALS
DIASTOLIC BLOOD PRESSURE: 74 MMHG | SYSTOLIC BLOOD PRESSURE: 131 MMHG | TEMPERATURE: 98 F | OXYGEN SATURATION: 100 % | HEART RATE: 80 BPM | RESPIRATION RATE: 18 BRPM

## 2021-06-28 LAB
GLUCOSE BLDC GLUCOMTR-MCNC: 121 MG/DL — HIGH (ref 70–99)
GLUCOSE BLDC GLUCOMTR-MCNC: 129 MG/DL — HIGH (ref 70–99)
GLUCOSE BLDC GLUCOMTR-MCNC: 172 MG/DL — HIGH (ref 70–99)
SARS-COV-2 RNA SPEC QL NAA+PROBE: SIGNIFICANT CHANGE UP

## 2021-06-28 PROCEDURE — 82272 OCCULT BLD FECES 1-3 TESTS: CPT

## 2021-06-28 PROCEDURE — 99285 EMERGENCY DEPT VISIT HI MDM: CPT

## 2021-06-28 PROCEDURE — 71045 X-RAY EXAM CHEST 1 VIEW: CPT

## 2021-06-28 PROCEDURE — 82607 VITAMIN B-12: CPT

## 2021-06-28 PROCEDURE — 85025 COMPLETE CBC W/AUTO DIFF WBC: CPT

## 2021-06-28 PROCEDURE — 85610 PROTHROMBIN TIME: CPT

## 2021-06-28 PROCEDURE — 36415 COLL VENOUS BLD VENIPUNCTURE: CPT

## 2021-06-28 PROCEDURE — 84484 ASSAY OF TROPONIN QUANT: CPT

## 2021-06-28 PROCEDURE — 86850 RBC ANTIBODY SCREEN: CPT

## 2021-06-28 PROCEDURE — A9540: CPT

## 2021-06-28 PROCEDURE — 78580 LUNG PERFUSION IMAGING: CPT

## 2021-06-28 PROCEDURE — 83735 ASSAY OF MAGNESIUM: CPT

## 2021-06-28 PROCEDURE — 93005 ELECTROCARDIOGRAM TRACING: CPT

## 2021-06-28 PROCEDURE — 86769 SARS-COV-2 COVID-19 ANTIBODY: CPT

## 2021-06-28 PROCEDURE — 85027 COMPLETE CBC AUTOMATED: CPT

## 2021-06-28 PROCEDURE — 83036 HEMOGLOBIN GLYCOSYLATED A1C: CPT

## 2021-06-28 PROCEDURE — 84100 ASSAY OF PHOSPHORUS: CPT

## 2021-06-28 PROCEDURE — 80053 COMPREHEN METABOLIC PANEL: CPT

## 2021-06-28 PROCEDURE — 86901 BLOOD TYPING SEROLOGIC RH(D): CPT

## 2021-06-28 PROCEDURE — 86900 BLOOD TYPING SEROLOGIC ABO: CPT

## 2021-06-28 PROCEDURE — 94640 AIRWAY INHALATION TREATMENT: CPT

## 2021-06-28 PROCEDURE — 80048 BASIC METABOLIC PNL TOTAL CA: CPT

## 2021-06-28 PROCEDURE — 82962 GLUCOSE BLOOD TEST: CPT

## 2021-06-28 PROCEDURE — 80061 LIPID PANEL: CPT

## 2021-06-28 PROCEDURE — 87040 BLOOD CULTURE FOR BACTERIA: CPT

## 2021-06-28 PROCEDURE — 93306 TTE W/DOPPLER COMPLETE: CPT

## 2021-06-28 PROCEDURE — 85379 FIBRIN DEGRADATION QUANT: CPT

## 2021-06-28 PROCEDURE — 85730 THROMBOPLASTIN TIME PARTIAL: CPT

## 2021-06-28 PROCEDURE — 82533 TOTAL CORTISOL: CPT

## 2021-06-28 PROCEDURE — 87635 SARS-COV-2 COVID-19 AMP PRB: CPT

## 2021-06-28 PROCEDURE — 83880 ASSAY OF NATRIURETIC PEPTIDE: CPT

## 2021-06-28 PROCEDURE — 82803 BLOOD GASES ANY COMBINATION: CPT

## 2021-06-28 PROCEDURE — 84443 ASSAY THYROID STIM HORMONE: CPT

## 2021-06-28 PROCEDURE — 84145 PROCALCITONIN (PCT): CPT

## 2021-06-28 RX ORDER — ISTRADEFYLLINE 20 MG/1
1 TABLET, FILM COATED ORAL
Qty: 0 | Refills: 0 | DISCHARGE

## 2021-06-28 RX ORDER — APIXABAN 2.5 MG/1
2 TABLET, FILM COATED ORAL
Qty: 0 | Refills: 0 | DISCHARGE
Start: 2021-06-28

## 2021-06-28 RX ORDER — ACETAMINOPHEN 500 MG
2 TABLET ORAL
Qty: 0 | Refills: 0 | DISCHARGE
Start: 2021-06-28

## 2021-06-28 RX ORDER — MORPHINE SULFATE 50 MG/1
0.5 CAPSULE, EXTENDED RELEASE ORAL
Qty: 0 | Refills: 0 | DISCHARGE
Start: 2021-06-28

## 2021-06-28 RX ORDER — BUDESONIDE AND FORMOTEROL FUMARATE DIHYDRATE 160; 4.5 UG/1; UG/1
2 AEROSOL RESPIRATORY (INHALATION)
Qty: 0 | Refills: 0 | DISCHARGE
Start: 2021-06-28

## 2021-06-28 RX ORDER — ACETAMINOPHEN 500 MG
2 TABLET ORAL
Qty: 0 | Refills: 0 | DISCHARGE

## 2021-06-28 RX ORDER — ALBUTEROL 90 UG/1
2 AEROSOL, METERED ORAL
Qty: 0 | Refills: 0 | DISCHARGE
Start: 2021-06-28

## 2021-06-28 RX ORDER — MONTELUKAST 4 MG/1
1 TABLET, CHEWABLE ORAL
Qty: 0 | Refills: 0 | DISCHARGE
Start: 2021-06-28

## 2021-06-28 RX ORDER — ATORVASTATIN CALCIUM 80 MG/1
1 TABLET, FILM COATED ORAL
Qty: 0 | Refills: 0 | DISCHARGE
Start: 2021-06-28

## 2021-06-28 RX ORDER — DROXIDOPA 100 MG/1
1 CAPSULE ORAL
Qty: 0 | Refills: 0 | DISCHARGE

## 2021-06-28 RX ADMIN — Medication 1 APPLICATION(S): at 17:20

## 2021-06-28 RX ADMIN — MORPHINE SULFATE 0.5 MILLIGRAM(S): 50 CAPSULE, EXTENDED RELEASE ORAL at 01:12

## 2021-06-28 RX ADMIN — RASAGILINE 1 MILLIGRAM(S): 0.5 TABLET ORAL at 11:29

## 2021-06-28 RX ADMIN — BUDESONIDE AND FORMOTEROL FUMARATE DIHYDRATE 2 PUFF(S): 160; 4.5 AEROSOL RESPIRATORY (INHALATION) at 11:28

## 2021-06-28 RX ADMIN — Medication 81 MILLIGRAM(S): at 11:29

## 2021-06-28 RX ADMIN — APIXABAN 10 MILLIGRAM(S): 2.5 TABLET, FILM COATED ORAL at 17:20

## 2021-06-28 RX ADMIN — Medication 1 APPLICATION(S): at 00:30

## 2021-06-28 RX ADMIN — CARBIDOPA, LEVODOPA, AND ENTACAPONE 1 TABLET(S): 50; 200; 200 TABLET, FILM COATED ORAL at 05:30

## 2021-06-28 RX ADMIN — Medication 5 MILLIGRAM(S): at 11:31

## 2021-06-28 RX ADMIN — MORPHINE SULFATE 0.5 MILLIGRAM(S): 50 CAPSULE, EXTENDED RELEASE ORAL at 02:00

## 2021-06-28 RX ADMIN — CALCITRIOL 0.5 MICROGRAM(S): 0.5 CAPSULE ORAL at 11:29

## 2021-06-28 RX ADMIN — Medication 1 APPLICATION(S): at 11:28

## 2021-06-28 RX ADMIN — Medication 1: at 07:44

## 2021-06-28 RX ADMIN — APIXABAN 10 MILLIGRAM(S): 2.5 TABLET, FILM COATED ORAL at 05:30

## 2021-06-28 RX ADMIN — Medication 1 APPLICATION(S): at 05:30

## 2021-06-28 NOTE — PROGRESS NOTE ADULT - REASON FOR ADMISSION
hypotension and hypoxia

## 2021-06-28 NOTE — PROGRESS NOTE ADULT - ASSESSMENT
Patient, a 75 year old female from [place] with past medical history of Parkinson's Disease, COPD, diverticulosis, HTN, DM, DVT and HLD is sent to the ED from NH for hypotension and hypoxia. 
S/P Hypoxemia sec to Hypotension ? P/E  COPD HX   Parkinsonism  Htn with MR   NIDDM with pre renal azotemia   Anemia/ S/P Rectal Bleed   DVT Rt Leg s/p IVC Filter      Plan- Eliquis with caution    Increase [id number]/4.5 2 puffs bid  [id number] mg qd po   Continue with present Rx   o2 n/c 2 lpm-- D/c if o2 sat >905 on R/A  
S/P Hypoxemia sec to Hypotension ? P/E  COPD HX   Parkinsonism/? Dementia  Htn with MR   NIDDM with pre renal azotemia   Anemia/ S/P Rectal Bleed   DVT Rt Leg s/p IVC Filter      Plan- Eliquis with caution If tolerates well , Monitor CBC, then d/c heparin   CTA chest   Iv Fluids   No Diuretics   o2 n/c 2 lpm   Thanks for consult
· Assessment	  75 year old lady presented with rectal bleeding.  CT showed rectal thickening, adnexal mass, and DVT.    1. rectal bleeding. large hemorrhoid, and rectal ulcer  cauterized  no bleeding in last week    2. adnexal mass  will do transvaginal ultrasound  check ca 125  US showed fibroids    3. DVT  not on AC yet due to rectal bleeding  she has rectal bleeding again while on heparin  IVC filter placed  there is no more bleeding  but DVT still needs to be treated      4 hypoxia and hypotension  ?PE, ?sepsis  do BC and procal   V/Q scan positive  heparin drip began, keep PTT around 60  
S/P Hypoxemia sec to Hypotension ? P/E  COPD HX   Parkinsonism/? Dementia  Htn with MR   NIDDM with pre renal azotemia   Anemia/ S/P Rectal Bleed   DVT Rt Leg s/p IVC Filter      Plan- Eliquis with caution    Continue with present Rx   o2 n/c 2 lpm  
S/P Hypoxemia sec to Hypotension ? P/E  COPD HX   Parkinsonism/? Dementia  Htn with MR   NIDDM with pre renal azotemia   Anemia/ S/P Rectal Bleed   DVT Rt Leg s/p IVC Filter      Plan- Eliquis with caution  , Monitor CBC   No Diuretics   o2 n/c 2 lpm  
· Assessment	  75 year old lady presented with rectal bleeding.  CT showed rectal thickening, adnexal mass, and DVT.    1. rectal bleeding. large hemorrhoid, and rectal ulcer  cauterized  no bleeding in last week    2. adnexal mass  will do transvaginal ultrasound  check ca 125  US showed fibroids    3. DVT  not on AC yet due to rectal bleeding  she has rectal bleeding again while on heparin  IVC filter placed  there is no more bleeding  but DVT still needs to be treated      4 hypoxia and hypotension  ?PE, ?sepsis  do BC and procal   V/Q scan positive  heparin drip began, keep PTT around 60  now on eliquis
Patient, a 75 year old female from [place] with past medical history of Parkinson's Disease, COPD, diverticulosis, HTN, DM, DVT and HLD is sent to the ED from NH for hypotension and hypoxia. 
Patient, a 75 year old female from [place] with past medical history of Parkinson's Disease, COPD, diverticulosis, HTN, DM, DVT and HLD is sent to the ED from NH for hypotension and hypoxia. 
Patient, a 75 year old female from [place] with past medical history of Parkinson's Disease, COPD, diverticulosis, HTN, DM, DVT and HLD is sent to the ED from NH for hypotension and hypoxia. 
Patient, a 75 year old female from [place] with past medical history of Parkinson's Disease, COPD, diverticulosis, HTN, DM, DVT and HLD is sent to the ED from NH for hypotension and hypoxia. 
Patient, a 75 year old female from [place] with past medical history of Parkinson's Disease, COPD, diverticulosis, HTN, DM, DVT and HLD is sent to the ED from NH for hypotension and hypoxia. 
Patient, a 75 year old female from [place] with past medical history of Parkinson's Disease, COPD, diverticulosis, HTN, DM, DVT and HLD is sent to the ED from NH for hypotension and hypoxia.

## 2021-06-28 NOTE — PROGRESS NOTE ADULT - NSICDXPILOT_GEN_ALL_CORE
[place]

## 2021-06-28 NOTE — PROGRESS NOTE ADULT - SUBJECTIVE AND OBJECTIVE BOX
PULMONARY  progress note    HEMA NAJERA  MRN-[id number]    Patient is a 75y old  Female who presents with a chief complaint of hypotension and hypoxia (28 Jun 2021 09:03)  no cough or sob, no chest pain      MEDICATIONS  (STANDING):  apixaban 10 milliGRAM(s) Oral two times a day  aspirin  chewable 81 milliGRAM(s) Oral daily  atorvastatin 40 milliGRAM(s) Oral at bedtime  BACItracin   Ointment 1 Application(s) Topical four times a day  budesonide  80 MICROgram(s)/formoterol 4.5 MICROgram(s) Inhaler 2 Puff(s) Inhalation two times a day  calcitriol   Capsule 0.5 MICROGram(s) Oral daily  carbidopa/levodopa/entacapone 12.5/50/200 1 Tablet(s) Oral three times a day  insulin lispro (ADMELOG) corrective regimen sliding scale   SubCutaneous three times a day before meals  montelukast 10 milliGRAM(s) Oral at bedtime  oxybutynin XL 5 milliGRAM(s) Oral daily  rasagiline Tablet 1 milliGRAM(s) Oral daily  senna 2 Tablet(s) Oral at bedtime  sodium chloride 0.9%. 1000 milliLiter(s) (70 mL/Hr) IV Continuous <Continuous>      Allergies    No Known Allergies        PAST MEDICAL & SURGICAL HISTORY:    Parkinson disease    History of COPD    Diverticulosis    HTN (hypertension)    Diabetes mellitus    HLD (hyperlipidemia)                 REVIEW OF SYSTEMS: as per staff  CONSTITUTIONAL: No fever, weight loss+  EYES: No eye pain, visual disturbances, or discharge  ENT:  No difficulty hearing, tinnitus, vertigo; No sinus or throat pain  NECK: No pain or stiffness or nodes  RESPIRATORY:  cough --  wheezing--   chills--   hemoptysis -- Shortness of Breath--  CARDIOVASCULAR: No chest pain, palpitations, passing out, dizziness, or leg swelling  GASTROINTESTINAL: No abdominal or epigastric pain. No nausea, vomiting  GENITOURINARY: No dysuria, frequency, hematuria, or incontinence  NEUROLOGICAL: No headaches, memory loss, loss of strength, numbness  but  tremors  SKIN: No itching, burning, rashes, or lesions   LYMPH Nodes: No enlarged glands  ENDOCRINE: No heat or cold intolerance; No hair loss  HEME/LYMPH: No easy bruising, or bleeding gums  ALLERGY AND IMMUNOLOGIC: No hives or eczema            Vital Signs Last 24 Hrs  T(C): 36.4 (28 Jun 2021 07:24), Max: 36.6 (27 Jun 2021 19:39)  T(F): 97.5 (28 Jun 2021 07:24), Max: 97.9 (27 Jun 2021 19:39)  HR: 75 (28 Jun 2021 07:24) (70 - 83)  BP: 108/65 (28 Jun 2021 07:24) (108/65 - 148/77)  BP(mean): --  RR: 18 (28 Jun 2021 07:24) (18 - 18)  SpO2: 100% (28 Jun 2021 07:24) (100% - 100%)  I&O's Detail    27 Jun 2021 07:01  -  28 Jun 2021 07:00  --------------------------------------------------------  IN:    Oral Fluid: 400 mL  Total IN: 400 mL    OUT:  Total OUT: 0 mL    Total NET: 400 mL      28 Jun 2021 07:01  -  28 Jun 2021 10:23  --------------------------------------------------------  IN:    Oral Fluid: 200 mL  Total IN: 200 mL    OUT:  Total OUT: 0 mL    Total NET: 200 mL          PHYSICAL EXAMINATION:    GENERAL: The patient is a thin h/f   SKIN no rash ecchymoses or bruises  HEENT: Head is normocephalic and atraumatic  JEVON , Extraocular muscles are intact.   Neck supple ,No LN felt JVP not increased  Thyroid not enlarged  Cardiovascular:  S1 S2 heard, RSR, No JVD , systolic  murmur at apex, No gallop or rub  Respiratory: Chest wall symmetrical with good air entry ,Percussion note normal,    Lungs vesicular breathing with no   rales or   wheeze	  ABDOMEN:  Soft, Non-tender, no hepatomegaly or splenomegaly BS positive	  Extremities:  No clubbing, cyanosis or edema  Vascular: Peripheral pulses palpable 2+ bilaterally  CNS:  awake and responsive   Cranial nerves intact  sensory intact  motor power 5/5  dtr 3+   Babinski neg      LAB:     Vitamin B12, Serum: 448 pg/mL (06-24-21 @ 14:07)      MICROBIOLOGY:    Culture - Blood (collected 06-24-21 @ 13:38)  Source: .Blood Blood  Preliminary Report (06-25-21 @ 14:01):    No growth to date.

## 2021-06-28 NOTE — PROGRESS NOTE ADULT - SUBJECTIVE AND OBJECTIVE BOX
no fever  no bleeding  no sob      MEDICATIONS  (STANDING):  apixaban 10 milliGRAM(s) Oral two times a day  aspirin  chewable 81 milliGRAM(s) Oral daily  atorvastatin 40 milliGRAM(s) Oral at bedtime  BACItracin   Ointment 1 Application(s) Topical four times a day  budesonide  80 MICROgram(s)/formoterol 4.5 MICROgram(s) Inhaler 2 Puff(s) Inhalation two times a day  calcitriol   Capsule 0.5 MICROGram(s) Oral daily  carbidopa/levodopa/entacapone 12.5/50/200 1 Tablet(s) Oral three times a day  insulin lispro (ADMELOG) corrective regimen sliding scale   SubCutaneous three times a day before meals  montelukast 10 milliGRAM(s) Oral at bedtime  oxybutynin XL 5 milliGRAM(s) Oral daily  rasagiline Tablet 1 milliGRAM(s) Oral daily  senna 2 Tablet(s) Oral at bedtime  sodium chloride 0.9%. 1000 milliLiter(s) (70 mL/Hr) IV Continuous <Continuous>    MEDICATIONS  (PRN):  acetaminophen   Tablet .. 650 milliGRAM(s) Oral every 6 hours PRN Temp greater or equal to 38C (100.4F), Moderate Pain (4 - 6)  ALBUTerol    90 MICROgram(s) HFA Inhaler 2 Puff(s) Inhalation every 6 hours PRN Shortness of Breath and/or Wheezing  morphine  - Injectable 0.5 milliGRAM(s) IV Push every 8 hours PRN Severe Pain (7 - 10)      Allergies    No Known Allergies    Intolerances        Vital Signs Last 24 Hrs  T(C): 36.4 (28 Jun 2021 07:24), Max: 36.6 (27 Jun 2021 19:39)  T(F): 97.5 (28 Jun 2021 07:24), Max: 97.9 (27 Jun 2021 19:39)  HR: 75 (28 Jun 2021 07:24) (70 - 83)  BP: 108/65 (28 Jun 2021 07:24) (108/65 - 148/77)  BP(mean): --  RR: 18 (28 Jun 2021 07:24) (18 - 18)  SpO2: 100% (28 Jun 2021 07:24) (100% - 100%)    PHYSICAL EXAM  General: adult in NAD  HEENT: clear oropharynx, anicteric sclera, pink conjunctiva  Neck: supple  CV: normal S1/S2 with no murmur rubs or gallops  Lungs: positive air movement b/l ant lungs,clear to auscultation, no wheezes, no rales  Abdomen: soft non-tender non-distended, no hepatosplenomegaly  Ext: no clubbing cyanosis or edema  Skin: no rashes and no petechiae  Neuro: alert and oriented X 4, no focal deficits  LABS:          Serial CBC  Hematocrit 28.3  Hemoglobin 8.8  Plat 267  RBC 3.08  WBC 7.41  Serial CBC  Hematocrit 27.7  Hemoglobin 8.8  Plat 305  RBC 3.07  WBC 9.20  Serial CBC  Hematocrit 28.3  Hemoglobin 9.2  Plat 299  RBC 3.19  WBC 6.84                Vitamin B12, Serum: 448 pg/mL (06-24 @ 14:07)            BLOOD SMEAR INTERPRETATION:       RADIOLOGY & ADDITIONAL STUDIES:

## 2021-06-28 NOTE — PROGRESS NOTE ADULT - SUBJECTIVE AND OBJECTIVE BOX
patient was seen and examined   s no c/o offered   INTERVAL HPI/OVERNIGHT EVENTS: patient refers feeling on , denies sob or chest pain, sat  MEDICATIONS  (STANDING):  apixaban 10 milliGRAM(s) Oral two times a day  aspirin  chewable 81 milliGRAM(s) Oral daily  atorvastatin 40 milliGRAM(s) Oral at bedtime  BACItracin   Ointment 1 Application(s) Topical four times a day  budesonide  80 MICROgram(s)/formoterol 4.5 MICROgram(s) Inhaler 2 Puff(s) Inhalation two times a day  calcitriol   Capsule 0.5 MICROGram(s) Oral daily  carbidopa/levodopa/entacapone 12.5/50/200 1 Tablet(s) Oral three times a day  insulin lispro (ADMELOG) corrective regimen sliding scale   SubCutaneous three times a day before meals  montelukast 10 milliGRAM(s) Oral at bedtime  oxybutynin XL 5 milliGRAM(s) Oral daily  rasagiline Tablet 1 milliGRAM(s) Oral daily  senna 2 Tablet(s) Oral at bedtime  sodium chloride 0.9%. 1000 milliLiter(s) (70 mL/Hr) IV Continuous <Continuous>    MEDICATIONS  (PRN):  acetaminophen   Tablet .. 650 milliGRAM(s) Oral every 6 hours PRN Temp greater or equal to 38C (100.4F), Moderate Pain (4 - 6)  ALBUTerol    90 MICROgram(s) HFA Inhaler 2 Puff(s) Inhalation every 6 hours PRN Shortness of Breath and/or Wheezing  morphine  - Injectable 0.5 milliGRAM(s) IV Push every 8 hours PRN Severe Pain (7 - 10)    Vital Signs Last 24 Hrs  T(C): 36.7 (28 Jun 2021 11:03), Max: 36.7 (28 Jun 2021 11:03)  T(F): 98.1 (28 Jun 2021 11:03), Max: 98.1 (28 Jun 2021 11:03)  HR: 85 (28 Jun 2021 11:03) (71 - 85)  BP: 111/62 (28 Jun 2021 11:03) (108/65 - 148/77)  BP(mean): --  RR: 18 (28 Jun 2021 11:03) (18 - 18)  SpO2: 100% (28 Jun 2021 11:03) (100% - 100%)        PHYSICAL EXAMINATION:  GENERAL: NAD, deconditioned, on NC, pale, poor nutrition and appetite   HEAD:  Atraumatic, Normocephalic  EYES:  conjunctiva and sclera clear  NECK: Supple, No JVD, Normal thyroid  CHEST/LUNG: Clear to auscultation. Clear to percussion bilaterally; No rales, rhonchi, wheezing, or rubs  HEART: Regular rate and rhythm; No murmurs, rubs, or gallops  ABDOMEN: Soft, Nontender, Nondistended; Bowel sounds present, no pain or masses on palpation  NERVOUS SYSTEM:  Alert & Oriented X1-2, unable to actively move LE, however UE with conserved strength, very pleasant  : incontinent   EXTREMITIES:  2+ Peripheral Pulses, No clubbing, cyanosis, or edema, contracted, scars on both knee from knee surgery    SKIN: warm dry      LABS:                            8.8    7.41  )-----------( 267      ( 25 Jun 2021 10:28 )             28.3     06-25    142  |  115<H>  |  34<H>  ----------------------------<  126<H>  4.9   |  27  |  0.98    Ca    9.7      25 Jun 2021 07:41  Phos  2.7     06-25  Mg     2.0     06-25            PT/INR - ( 24 Jun 2021 13:01 )   PT: 12.4 sec;   INR: 1.04 ratio         PTT - ( 25 Jun 2021 10:28 )  PTT:>200.0 sec    I&O's Summary        Culture - Blood (collected 24 Jun 2021 13:38)  Source: .Blood Blood  Preliminary Report (25 Jun 2021 14:01):    No growth to date.

## 2021-06-28 NOTE — PROGRESS NOTE ADULT - TIME BILLING
I have examined pt personally Hx chart lab and xrays reviewed and pt discussed with residents
I have examined pt personally Hx chart lab and xrays reviewed and pt discussed with residents
I have examined pt personally Hx chart lab and xrays reviewed and pt discussed with resident and PMD
patient with above dxs and care plan   d/w resident and patient's family
I have examined pt personally Hx chart lab and xrays reviewed and pt discussed with residents
patient with above dxs and care plan   patient is waiting for discharge to [facility] under Hospice care  patient is clinically stable at present for d/c.  d/w resident and patient's family
patient with above dxs and care plan   patient is waiting for discharge to [facility] under Hospice care  d/w resident and patient's family
patient with above dxs for hospital care   family decided for hospice care   long term prognosis poor   further tx plan as per hospice team  care plan d/w resident and patient's family

## 2021-06-28 NOTE — DISCHARGE NOTE NURSING/CASE MANAGEMENT/SOCIAL WORK - PATIENT PORTAL LINK FT
You can access the FollowMyHealth Patient Portal offered by [facility] by registering at the following website: [email or web address]. By joining [employer]’s FollowMyHealth portal, you will also be able to view your health information using other applications (apps) compatible with our system.

## 2021-06-28 NOTE — PROGRESS NOTE ADULT - PROVIDER SPECIALTY LIST ADULT
Pulmonology
Heme/Onc
Heme/Onc
Internal Medicine
Pulmonology
Internal Medicine
Internal Medicine
Pulmonology
Pulmonology
Internal Medicine

## 2021-06-29 LAB
CULTURE RESULTS: SIGNIFICANT CHANGE UP
SPECIMEN SOURCE: SIGNIFICANT CHANGE UP

## 2021-08-15 ENCOUNTER — EMERGENCY (EMERGENCY)
Facility: HOSPITAL | Age: 76
LOS: 1 days | Discharge: HOME | End: 2021-08-15
Attending: EMERGENCY MEDICINE
Payer: COMMERCIAL

## 2021-08-15 VITALS
SYSTOLIC BLOOD PRESSURE: 134 MMHG | RESPIRATION RATE: 18 BRPM | OXYGEN SATURATION: 100 % | DIASTOLIC BLOOD PRESSURE: 87 MMHG | HEART RATE: 87 BPM | TEMPERATURE: 97 F

## 2021-08-15 VITALS
HEIGHT: 64 IN | TEMPERATURE: 98 F | DIASTOLIC BLOOD PRESSURE: 74 MMHG | HEART RATE: 75 BPM | OXYGEN SATURATION: 98 % | RESPIRATION RATE: 16 BRPM | WEIGHT: 102.07 LBS | SYSTOLIC BLOOD PRESSURE: 114 MMHG

## 2021-08-15 PROCEDURE — 23650 CLTX SHO DSLC W/MNPJ WO ANES: CPT | Mod: RT

## 2021-08-15 PROCEDURE — 73030 X-RAY EXAM OF SHOULDER: CPT

## 2021-08-15 PROCEDURE — 73060 X-RAY EXAM OF HUMERUS: CPT

## 2021-08-15 PROCEDURE — 73060 X-RAY EXAM OF HUMERUS: CPT | Mod: 26,RT

## 2021-08-15 PROCEDURE — 99151 MOD SED SAME PHYS/QHP <5 YRS: CPT

## 2021-08-15 PROCEDURE — 73030 X-RAY EXAM OF SHOULDER: CPT | Mod: 26,RT

## 2021-08-15 PROCEDURE — 99284 EMERGENCY DEPT VISIT MOD MDM: CPT | Mod: 25

## 2021-08-15 PROCEDURE — 99156 MOD SED OTH PHYS/QHP 5/>YRS: CPT

## 2021-08-15 PROCEDURE — 99285 EMERGENCY DEPT VISIT HI MDM: CPT | Mod: 25

## 2021-08-15 RX ORDER — PROPOFOL 10 MG/ML
40 INJECTION, EMULSION INTRAVENOUS ONCE
Refills: 0 | Status: COMPLETED | OUTPATIENT
Start: 2021-08-15 | End: 2021-08-15

## 2021-08-15 RX ORDER — PROPOFOL 10 MG/ML
20 INJECTION, EMULSION INTRAVENOUS ONCE
Refills: 0 | Status: COMPLETED | OUTPATIENT
Start: 2021-08-15 | End: 2021-08-15

## 2021-08-15 RX ADMIN — PROPOFOL 40 MILLIGRAM(S): 10 INJECTION, EMULSION INTRAVENOUS at 10:48

## 2021-08-15 RX ADMIN — PROPOFOL 40 MILLIGRAM(S): 10 INJECTION, EMULSION INTRAVENOUS at 15:05

## 2021-08-15 RX ADMIN — PROPOFOL 20 MILLIGRAM(S): 10 INJECTION, EMULSION INTRAVENOUS at 10:55

## 2021-08-15 NOTE — CONSULT NOTE ADULT - SUBJECTIVE AND OBJECTIVE BOX
74yo sent from [facility] with x-ray reported with right shoulder dislocation. No hx of trauma reported and unknown when dislocation occurred. Pt is non-verbal and non-ambulator.  X-rays in ER showed showed an anterior dislocation of right glenohumeral joint with no fractures seen in shoulder or humerus. Closed reduction under sedation done by Dr Zhu but unsuccessful and Ortho consult called.  I saw and evaluated pt in ER.    PAST MEDICAL/SURGICAL/FAMILY/SOCIAL HISTORY:    Past Medical, Past Surgical, and Family History:  PAST MEDICAL HISTORY:  Diabetes mellitus     Diverticulosis     History of COPD     HLD (hyperlipidemia)     HTN (hypertension)     Parkinson disease.     PAST SURGICAL HISTORY:  No significant past surgical history.     Tobacco Usage:  · Tobacco Usage	Unknown if ever smoked    ALLERGIES AND HOME MEDICATIONS:   Allergies:        Allergies:  	No Known Allergies:     Home Medications:   * Patient Currently Takes Medications as of 28-Jun-2021 15:20 documented in Structured Notes  · 	montelukast 10 mg oral tablet: 1 tab(s) orally once a day (at bedtime)  · 	budesonide-formoterol 80 mcg-4.5 mcg/inh inhalation aerosol: 2 puff(s) inhaled 2 times a day  · 	albuterol 90 mcg/inh inhalation aerosol: 2 puff(s) inhaled every 6 hours, As needed, Shortness of Breath and/or Wheezing  · 	atorvastatin 40 mg oral tablet: 1 tab(s) orally once a day (at bedtime)  · 	apixaban 5 mg oral tablet: 2 tab(s) orally 2 times a day for three more days to complete 7 days (6/29, 6/30, 7/1) then take 1 tab orally 2 times a day starting 7/2  · 	morphine: 0.5 milligram(s) intravenous every 8 hours, As Needed for severe pain  · 	acetaminophen 325 mg oral tablet: 2 tab(s) orally every 6 hours, As needed, Temp greater or equal to 38C (100.4F), Moderate Pain (4 - 6)  · 	ascorbic acid 500 mg oral tablet: 1 tab(s) orally once a day  · 	ferrous sulfate 325 mg (65 mg elemental iron) oral tablet: 1 tab(s) orally once a day  · 	oxybutynin 5 mg/24 hours oral tablet, extended release: 1 tab(s) orally once a day  · 	calcitriol 0.5 mcg oral capsule: 1 cap(s) orally once a day  · 	carbidopa/levodopa/entacapone 12.5 mg-50 mg-200 mg oral tablet: 1 tab(s) orally 3 times a day  · 	rasagiline 1 mg oral tablet: 1 tab(s) orally once a day  · 	Senna 8.6 mg oral tablet: 2 tab(s) orally once a day (at bedtime)  · 	bacitracin 500 units/g topical ointment: Apply topically to affected area 4 times a day  · 	MiraLax oral powder for reconstitution: 1 packet(s) orally once a day, As Needed    REVIEW OF SYSTEMS:    Review of Systems:  · UNABLE TO OBTAIN: Dementia    PE: Pt awake and alert but not answering questions or following commands.       Right arm internally rotated across chest with mild tenderness over palpable humeral head medial to glenoid.       Has loss of contour of lateral shoulder and exhibits minimal pain on PROM of shoulder.       Palpable radial pulse.    X-rays: Right shoulder and right humerus shows an anterior dislocation with no fractures.    A/P: Anterior dislocation right shoulder with no h/o trauma but not seen on 6/24/21 CXR    Procedure: Closed reduction of right shoulder dislocation under conscious sedation.                   Able to get excellent sedation and leverage with traction on arm but palpable humeral was not moving laterally.                   After several tries with increased medication still unable to palpate humeral head moving laterally.                   Unsuccessful reduction of right shoulder. Good palpable radial pulse after procedure.    Plan: Recommendation is to leave shoulder dislocated and allow pt to function as able. She has little tenderness and pain on motion and can use arm as able. I would not recommend surgery as patients can function with little pain with the dislocation. Discussed on phone with daughter Anastacia Johnson (nurse) about the dislocation and inability to reduce and recommendation to not have surgery. Also recommended consultation with an orthopedic shoulder specialist if they would like another opinion about surgery. All quesitons answered.                 Plan

## 2021-08-15 NOTE — ED PROVIDER NOTE - CLINICAL SUMMARY MEDICAL DECISION MAKING FREE TEXT BOX
pt presents from nursing home for right shoulder dislocation of unknown time.  Will check xray reassess for reduction.

## 2021-08-15 NOTE — ED ADULT NURSE NOTE - NSIMPLEMENTINTERV_GEN_ALL_ED
Implemented All Fall with Harm Risk Interventions:  [place] to call system. Call bell, personal items and telephone within reach. Instruct patient to call for assistance. Room bathroom lighting operational. Non-slip footwear when patient is off stretcher. Physically safe environment: no spills, clutter or unnecessary equipment. Stretcher in lowest position, wheels locked, appropriate side rails in place. Provide visual cue, wrist band, yellow gown, etc. Monitor gait and stability. Monitor for mental status changes and reorient to person, place, and time. Review medications for side effects contributing to fall risk. Reinforce activity limits and safety measures with patient and family. Provide visual clues: red socks.

## 2021-08-15 NOTE — ED PROVIDER NOTE - PATIENT PORTAL LINK FT
You can access the FollowMyHealth Patient Portal offered by [facility] by registering at the following website: [email or web address]. By joining [employer]’s FollowMyHealth portal, you will also be able to view your health information using other applications (apps) compatible with our system.

## 2021-08-15 NOTE — ED PROVIDER NOTE - NSFOLLOWUPINSTRUCTIONS_ED_ALL_ED_FT
Shoulder Dislocation was unsuccessful.      Pt seen by orthopedic surgery, recommend discharge.  Patient is not a surgical candidate.        WHAT YOU NEED TO KNOW:    What is a shoulder dislocation? A shoulder dislocation happens when the top of your arm bone (humerus) moves out of the socket in your shoulder blade. Shoulder dislocation can happen at any age but is not common in younger children.    Dislocated Shoulder         What are the signs and symptoms of a shoulder dislocation?   •Shoulder and arm pain that worsens with movement      •A bump in front of or behind your shoulder      •Different shapes for each shoulder      •Redness and swelling in your shoulder      •Muscle spasms in your shoulder      •Weakness, numbness, or tingling in your shoulder and arm      How is a shoulder dislocation diagnosed? Your healthcare provider will ask about your signs and symptoms. He or she will compare the shape of your shoulders. He or she may touch areas of your shoulder and arm to see if you have decreased feeling. Your provider may also check your arm strength and movement. Tell him or her if you have had a shoulder dislocation in the past. You may also need the following:  •X-ray, CT, or MRI pictures may be taken to look at the bones in your shoulder or to check for tissue damage. You may be given contrast liquid before the pictures are taken to help healthcare providers see the pictures better. Tell the healthcare provider if you have ever had an allergic reaction to contrast liquid. Do not enter the MRI room with anything metal. Metal can cause serious injury. Tell the healthcare provider if you have any metal in or on your body.      •An arthrogram is an x-ray taken after contrast liquid is injected into your injured joint. This test is used to look at the bones and muscles in your shoulder joint. Tell the healthcare provider if you have ever had an allergic reaction to contrast liquid.      How is a shoulder dislocation treated? Treatment depends on how badly your shoulder is dislocated, and if bone or tissue is damaged. You may need any of the following:  •Manual reduction is used to move your dislocated humerus back into place by hand. Weights may also be used to help pull your humerus into place.      •Prescription pain medicine may be given. Ask your healthcare provider how to take this medicine safely. Some prescription pain medicines contain acetaminophen. Do not take other medicines that contain acetaminophen without talking to your healthcare provider. Too much acetaminophen may cause liver damage. Prescription pain medicine may cause constipation. Ask your healthcare provider how to prevent or treat constipation.       •A sling, splint, or brace may be needed after your shoulder dislocation is treated. A sling or splint will limit shoulder movement while supporting it. A brace protects your shoulder from injury after treatment. Ask your healthcare provider for more information about slings, splints, and braces.  Shoulder Sling           •Physical therapy may be used to teach you exercises to help improve movement and strength, and to decrease pain.      •Surgery may be needed to repair damaged tissues around your shoulder joint. You may also need a bone graft to repair your shoulder. A bone graft is artificial bone used to replace your damaged bone. You may also need surgery if your shoulder dislocates often.      What can I do to care for my shoulder?   •Apply ice to your shoulder. Ice helps decrease swelling and pain. Ice may also help prevent tissue damage. Use an ice pack, or put crushed ice in a plastic bag. Cover it with a towel before you place it on your shoulder. Apply ice for 15 to 20 minutes every hour or as directed.      •Rest your shoulder. Rest helps your muscles and tissues heal. Avoid activities that cause pain or use an overhead arm motion. Your healthcare provider will tell you when it is safe to return to sports or other daily activities.      When should I seek immediate care?   •Your shoulder and arm become pale or cold.      •You cannot move your shoulder and arm.      •You have more redness or swelling in your shoulder.      •Your shoulder becomes dislocated again.      When should I call my doctor?   •You have a fever.      •You have more pain in your shoulder and arm even after you rest and take your medicine.      •You have new weakness or numbness in your shoulder and arm.      •You have questions or concerns about your condition or care.      CARE AGREEMENT:    You have the right to help plan your care. Learn about your health condition and how it may be treated. Discuss treatment options with your healthcare providers to decide what care you want to receive. You always have the right to refuse treatment.        © Copyright [employer] 2021           back to top                          © Copyright [employer] 2021

## 2021-08-15 NOTE — ED PROVIDER NOTE - PROGRESS NOTE DETAILS
shoulder reduction  unsuccessful.  ortho to be consulted. Ortho Dr. Mckeon came to evaluate patient.  Reduction unsuccessful patient with adequate sedation.  Recommend dc back to facility as patient can be functional with dislocated shoulder.  [employer] contacted and informed of unsuccessful reduction.  Pt to be discharged.  Will arrange transportation.

## 2021-08-15 NOTE — ED PROVIDER NOTE - OBJECTIVE STATEMENT
74 y/o female sent from [facility] for dislocated right shoulder.  No report of trauma given.  unknown when dislocation occurred.  Pt had xray yesterday which showed dislocation.  pt brought to ED today for further eval.  pt with PMHx of Parkinson's, DM, HTN, HLD, COPD.  pt non-verbal at baseline, not able to provide any collateral info.

## 2021-08-15 NOTE — ED PROVIDER NOTE - NS ED MD DISPO DISCHARGE CCDA
Normal vision: sees adequately in most situations; can see medication labels, newsprint
Patient/Caregiver provided printed discharge information.

## 2022-07-27 NOTE — ED ADULT NURSE NOTE - SUICIDE SCREENING QUESTION 1
Patient unable to complete Topical Clindamycin Pregnancy And Lactation Text: This medication is Pregnancy Category B and is considered safe during pregnancy. It is unknown if it is excreted in breast milk.

## 2023-05-26 NOTE — ED PROVIDER NOTE - NS ED MD EM SELECTION
PAST MEDICAL HISTORY:  No pertinent past medical history      [address]343 Exp Problem Focused - Mod. Complex

## 2023-10-03 NOTE — ED ADULT NURSE NOTE - CCCP TRG CHIEF CMPLNT
Detail Level: Detailed
Add 1585x Cpt? (Do Not Bill If You Billed For The Procedure Placing The Sutures. This Is An Add-On Code That Must Be Billed With An E/M Visit Code): No
right shoulder discoloration

## 2023-11-20 ENCOUNTER — APPOINTMENT (OUTPATIENT)
Dept: COLORECTAL SURGERY | Facility: HOSPITAL | Age: 78
End: 2023-11-20

## 2024-09-03 NOTE — PATIENT PROFILE ADULT - FUNCTIONAL SCREEN CURRENT LEVEL: SWALLOWING (IF SCORE 2 OR MORE FOR ANY ITEM, CONSULT REHAB SERVICES), MLM)
_______________________________________________________________________  [facility] ([facility]) [facility]  Services: medical, behavioral health  Hours:  Monday: 8:30am to 6:30pm  Tuesday - Friday: 8:30am to 5:00pm  Closed daily for lunch 12p - 1p  Closes at 12p on the 1st Wednesday of each month  Location: [address] [place] [address] ([place]ross from bus station)  [phone] (fax)  _______________________________________________________________________   [facility] ([facility]) - [place]  Services: medical, dental, behavioral health, pharmacy  Accepts most insurances, including Medicare, Medicaid, HMOs  Hours:  Health center: Monday to Friday 8:00am - 5:00pm (open until 6:30pm on Thurs)  Dental: Monday to Friday 7:30am - 3:30pm  Pharmacy: Mon, Tue, Wed, Fri 8:30am - 5:00pm; Thurs 9:30am - 6:00pm  Location: [address] [place], [place] [address] [address]  [phone] (fax)  _______________________________________________________________________   [facility] ([facility]) - [facility]  Services: medical addiction treatment  Accepts most insurances, including Medicare, Medicaid, HMOs  Hours:  Monday: 8:30am - 5:00pm  Tuesday: 7:00am - 5:00pm  Wednesday: 8:30am - 5:00pm  Thursday: 8:30am - 7:00pm  [phone] (fax)  _______________________________________________________________________   [facility]  Services: medical, dental, wellness workshops, behavioral health, addiction services  By appointment:  Cardiology  Rheumatology  [facility] Chronic Care  [facility] Mental Health  Women's Health / GYN  Mammograms and Mammogram Classes  Blood Pressure Screenings  General Health Screenings  Seasonal Flu Shots  Location: [address] [place] [address]  [phone]  _______________________________________________________________________  0 = swallows foods/liquids without difficulty

## 2024-09-11 NOTE — ED PROVIDER NOTE - GASTROINTESTINAL, MLM
Outgoing call to patient at this time.  Patient is inquiring how someone gets a hiatal hernia.  Writer informed patient that hiatal hernias can result from vomiting, retching, coughing episodes, weight gain, lifting very heavy items, etc.  Patient verbalized understanding.  Patient inquired how long her surgery is scheduled for.  Writer informed patient that she must be present 3 hours prior to surgery and surgery is scheduled for 2.5 hours.  Patient verbalized understanding and denies any additional questions or concerns for writer at this time.       Abdomen soft, non-tender, no guarding.

## 2025-01-08 NOTE — PROGRESS NOTE ADULT - ASSESSMENT
1. Rectal bleeding stopped  2. Anemia  3. S/p Colonoscopy  4. Rectal ulcer  5. Diverticulosis  6. Large hemorrhoids  7. No evidence of acute GI bleeding at present time    Suggestions:    1. Monitor H/H  2. Transfuse PRBC as needed  3. Avoid NSAID  4. Protonix 40mg daily  5. DVT prophylaxis   1 pair

## 2025-01-28 NOTE — DIETITIAN INITIAL EVALUATION ADULT. - PROBLEM/PLAN-8
Altoplase administered.  30 min waiting period and was able to draw back with blood returnn.  Port flushed and patient ambulated off unit independently.       Pt states he has an upcoming appt with Dr. Scanlon and will discuss further options for port.   DISPLAY PLAN FREE TEXT

## 2025-05-21 NOTE — PROGRESS NOTE ADULT - PROBLEM SELECTOR PLAN 1
No - 6/14 PCR negative, Repeat PCR 6/17  - Continue Isolation per protocol, can come off isolation 6/21  - no indication for steroids or remdesivir given 100% on room air  - Pt can D/c back to [facility] on 6/21  - CM/SW following

## 2025-07-24 NOTE — DISCHARGE NOTE NURSING/CASE MANAGEMENT/SOCIAL WORK - NSDPFAC_GEN_ALL_CORE
[FreeTextEntry1] : Patient was last seen here in 2019 for evaluation of exertional chest pain.    He has no history of myocardial infarction or congestive heart failure.  He however has hypertension hyperlipidemia  He then had a nuclear stress test which showed ischemic EKG changes but normal perfusion and a low normal EF.  His symptoms did improve with antiacid  Back on fish oil for his triglycerides -. didn't tolerate lopid, tricor  Diet is good Walks 5 days a week, for 45-60 minutes at a time  He denies CP, SOB, JAYCE, PND or orthopnea.  Uses his CPAP nightly Transient dizziness with quick change in position  Recent vertigo -.> vestibular therapy
[facility]: [id number] [place] [address]